# Patient Record
Sex: FEMALE | Race: WHITE | NOT HISPANIC OR LATINO | URBAN - METROPOLITAN AREA
[De-identification: names, ages, dates, MRNs, and addresses within clinical notes are randomized per-mention and may not be internally consistent; named-entity substitution may affect disease eponyms.]

---

## 2017-01-30 ENCOUNTER — OUTPATIENT (OUTPATIENT)
Dept: OUTPATIENT SERVICES | Facility: HOSPITAL | Age: 77
LOS: 1 days | Discharge: HOME | End: 2017-01-30

## 2017-05-03 ENCOUNTER — OUTPATIENT (OUTPATIENT)
Dept: OUTPATIENT SERVICES | Facility: HOSPITAL | Age: 77
LOS: 1 days | Discharge: HOME | End: 2017-05-03

## 2017-06-28 DIAGNOSIS — Z01.818 ENCOUNTER FOR OTHER PREPROCEDURAL EXAMINATION: ICD-10-CM

## 2017-10-02 DIAGNOSIS — R74.8 ABNORMAL LEVELS OF OTHER SERUM ENZYMES: ICD-10-CM

## 2017-10-02 DIAGNOSIS — R94.6 ABNORMAL RESULTS OF THYROID FUNCTION STUDIES: ICD-10-CM

## 2017-10-02 DIAGNOSIS — R68.89 OTHER GENERAL SYMPTOMS AND SIGNS: ICD-10-CM

## 2017-10-02 DIAGNOSIS — R79.82 ELEVATED C-REACTIVE PROTEIN (CRP): ICD-10-CM

## 2017-10-02 DIAGNOSIS — E78.4 OTHER HYPERLIPIDEMIA: ICD-10-CM

## 2017-10-02 DIAGNOSIS — Z02.89 ENCOUNTER FOR OTHER ADMINISTRATIVE EXAMINATIONS: ICD-10-CM

## 2017-10-02 DIAGNOSIS — E55.9 VITAMIN D DEFICIENCY, UNSPECIFIED: ICD-10-CM

## 2017-10-02 DIAGNOSIS — R73.09 OTHER ABNORMAL GLUCOSE: ICD-10-CM

## 2017-10-16 ENCOUNTER — OUTPATIENT (OUTPATIENT)
Dept: OUTPATIENT SERVICES | Facility: HOSPITAL | Age: 77
LOS: 1 days | Discharge: HOME | End: 2017-10-16

## 2017-10-16 DIAGNOSIS — R10.9 UNSPECIFIED ABDOMINAL PAIN: ICD-10-CM

## 2017-10-16 DIAGNOSIS — Z12.31 ENCOUNTER FOR SCREENING MAMMOGRAM FOR MALIGNANT NEOPLASM OF BREAST: ICD-10-CM

## 2017-10-23 ENCOUNTER — OUTPATIENT (OUTPATIENT)
Dept: OUTPATIENT SERVICES | Facility: HOSPITAL | Age: 77
LOS: 1 days | Discharge: HOME | End: 2017-10-23

## 2017-10-25 DIAGNOSIS — Z13.820 ENCOUNTER FOR SCREENING FOR OSTEOPOROSIS: ICD-10-CM

## 2017-10-25 DIAGNOSIS — Z78.0 ASYMPTOMATIC MENOPAUSAL STATE: ICD-10-CM

## 2017-10-25 DIAGNOSIS — M89.9 DISORDER OF BONE, UNSPECIFIED: ICD-10-CM

## 2017-11-06 ENCOUNTER — OUTPATIENT (OUTPATIENT)
Dept: OUTPATIENT SERVICES | Facility: HOSPITAL | Age: 77
LOS: 1 days | Discharge: HOME | End: 2017-11-06

## 2017-11-06 DIAGNOSIS — N28.9 DISORDER OF KIDNEY AND URETER, UNSPECIFIED: ICD-10-CM

## 2017-11-14 ENCOUNTER — OUTPATIENT (OUTPATIENT)
Dept: OUTPATIENT SERVICES | Facility: HOSPITAL | Age: 77
LOS: 1 days | Discharge: HOME | End: 2017-11-14

## 2017-11-14 DIAGNOSIS — R11.0 NAUSEA: ICD-10-CM

## 2018-05-26 ENCOUNTER — INPATIENT (INPATIENT)
Facility: HOSPITAL | Age: 78
LOS: 1 days | Discharge: HOME | End: 2018-05-28
Attending: INTERNAL MEDICINE | Admitting: INTERNAL MEDICINE

## 2018-05-26 VITALS
TEMPERATURE: 98 F | OXYGEN SATURATION: 97 % | RESPIRATION RATE: 20 BRPM | HEART RATE: 80 BPM | DIASTOLIC BLOOD PRESSURE: 80 MMHG | SYSTOLIC BLOOD PRESSURE: 168 MMHG

## 2018-05-26 RX ORDER — SODIUM CHLORIDE 9 MG/ML
1000 INJECTION INTRAMUSCULAR; INTRAVENOUS; SUBCUTANEOUS
Qty: 0 | Refills: 0 | Status: DISCONTINUED | OUTPATIENT
Start: 2018-05-26 | End: 2018-05-27

## 2018-05-26 RX ORDER — SODIUM CHLORIDE 9 MG/ML
3 INJECTION INTRAMUSCULAR; INTRAVENOUS; SUBCUTANEOUS ONCE
Qty: 0 | Refills: 0 | Status: COMPLETED | OUTPATIENT
Start: 2018-05-26 | End: 2018-05-26

## 2018-05-26 NOTE — ED ADULT NURSE NOTE - OBJECTIVE STATEMENT
Patient states she started to feeling head congestion this afternoon so she took 2 amoxicillin tablets, ate shrimp at that time. Preceding this she had one episode of vomiting which she stated was bile consistency but pink color. Reports history of GERD but non compliant with taking medication.

## 2018-05-27 DIAGNOSIS — Z96.651 PRESENCE OF RIGHT ARTIFICIAL KNEE JOINT: Chronic | ICD-10-CM

## 2018-05-27 DIAGNOSIS — Z90.710 ACQUIRED ABSENCE OF BOTH CERVIX AND UTERUS: Chronic | ICD-10-CM

## 2018-05-27 DIAGNOSIS — Z90.49 ACQUIRED ABSENCE OF OTHER SPECIFIED PARTS OF DIGESTIVE TRACT: Chronic | ICD-10-CM

## 2018-05-27 LAB
ALBUMIN SERPL ELPH-MCNC: 4.9 G/DL — SIGNIFICANT CHANGE UP (ref 3.5–5.2)
ALP SERPL-CCNC: 77 U/L — SIGNIFICANT CHANGE UP (ref 30–115)
ALT FLD-CCNC: 11 U/L — SIGNIFICANT CHANGE UP (ref 0–41)
ANION GAP SERPL CALC-SCNC: 13 MMOL/L — SIGNIFICANT CHANGE UP (ref 7–14)
APPEARANCE UR: CLEAR — SIGNIFICANT CHANGE UP
APTT BLD: 26.1 SEC — LOW (ref 27–39.2)
AST SERPL-CCNC: 15 U/L — SIGNIFICANT CHANGE UP (ref 0–41)
BASOPHILS # BLD AUTO: 0.03 K/UL — SIGNIFICANT CHANGE UP (ref 0–0.2)
BASOPHILS NFR BLD AUTO: 0.5 % — SIGNIFICANT CHANGE UP (ref 0–1)
BILIRUB SERPL-MCNC: 0.3 MG/DL — SIGNIFICANT CHANGE UP (ref 0.2–1.2)
BILIRUB UR-MCNC: NEGATIVE — SIGNIFICANT CHANGE UP
BUN SERPL-MCNC: 12 MG/DL — SIGNIFICANT CHANGE UP (ref 10–20)
CALCIUM SERPL-MCNC: 9.8 MG/DL — SIGNIFICANT CHANGE UP (ref 8.5–10.1)
CHLORIDE SERPL-SCNC: 103 MMOL/L — SIGNIFICANT CHANGE UP (ref 98–110)
CHOLEST SERPL-MCNC: 146 MG/DL — SIGNIFICANT CHANGE UP (ref 100–200)
CK MB CFR SERPL CALC: <0.1 NG/ML — LOW (ref 0.6–6.3)
CK MB CFR SERPL CALC: <0.1 NG/ML — LOW (ref 0.6–6.3)
CK SERPL-CCNC: 34 U/L — SIGNIFICANT CHANGE UP (ref 0–225)
CK SERPL-CCNC: 37 U/L — SIGNIFICANT CHANGE UP (ref 0–225)
CO2 SERPL-SCNC: 28 MMOL/L — SIGNIFICANT CHANGE UP (ref 17–32)
COLOR SPEC: YELLOW — SIGNIFICANT CHANGE UP
CREAT SERPL-MCNC: 0.7 MG/DL — SIGNIFICANT CHANGE UP (ref 0.7–1.5)
DIFF PNL FLD: NEGATIVE — SIGNIFICANT CHANGE UP
EOSINOPHIL # BLD AUTO: 0.02 K/UL — SIGNIFICANT CHANGE UP (ref 0–0.7)
EOSINOPHIL NFR BLD AUTO: 0.4 % — SIGNIFICANT CHANGE UP (ref 0–8)
ESTIMATED AVERAGE GLUCOSE: 111 MG/DL — SIGNIFICANT CHANGE UP (ref 68–114)
GLUCOSE SERPL-MCNC: 101 MG/DL — HIGH (ref 70–99)
GLUCOSE UR QL: NEGATIVE MG/DL — SIGNIFICANT CHANGE UP
HBA1C BLD-MCNC: 5.5 % — SIGNIFICANT CHANGE UP (ref 4–5.6)
HCT VFR BLD CALC: 38.7 % — SIGNIFICANT CHANGE UP (ref 37–47)
HDLC SERPL-MCNC: 65 MG/DL — SIGNIFICANT CHANGE UP (ref 40–125)
HGB BLD-MCNC: 13.1 G/DL — SIGNIFICANT CHANGE UP (ref 12–16)
IMM GRANULOCYTES NFR BLD AUTO: 0.4 % — HIGH (ref 0.1–0.3)
INR BLD: 1.12 RATIO — SIGNIFICANT CHANGE UP (ref 0.65–1.3)
KETONES UR-MCNC: NEGATIVE — SIGNIFICANT CHANGE UP
LACTATE SERPL-SCNC: 1.1 MMOL/L — SIGNIFICANT CHANGE UP (ref 0.5–2.2)
LEUKOCYTE ESTERASE UR-ACNC: NEGATIVE — SIGNIFICANT CHANGE UP
LIPID PNL WITH DIRECT LDL SERPL: 71 MG/DL — SIGNIFICANT CHANGE UP (ref 4–129)
LYMPHOCYTES # BLD AUTO: 1 K/UL — LOW (ref 1.2–3.4)
LYMPHOCYTES # BLD AUTO: 17.6 % — LOW (ref 20.5–51.1)
MAGNESIUM SERPL-MCNC: 2.3 MG/DL — SIGNIFICANT CHANGE UP (ref 1.8–2.4)
MCHC RBC-ENTMCNC: 31.6 PG — HIGH (ref 27–31)
MCHC RBC-ENTMCNC: 33.9 G/DL — SIGNIFICANT CHANGE UP (ref 32–37)
MCV RBC AUTO: 93.3 FL — SIGNIFICANT CHANGE UP (ref 81–99)
MONOCYTES # BLD AUTO: 0.39 K/UL — SIGNIFICANT CHANGE UP (ref 0.1–0.6)
MONOCYTES NFR BLD AUTO: 6.9 % — SIGNIFICANT CHANGE UP (ref 1.7–9.3)
NEUTROPHILS # BLD AUTO: 4.21 K/UL — SIGNIFICANT CHANGE UP (ref 1.4–6.5)
NEUTROPHILS NFR BLD AUTO: 74.2 % — SIGNIFICANT CHANGE UP (ref 42.2–75.2)
NITRITE UR-MCNC: NEGATIVE — SIGNIFICANT CHANGE UP
NRBC # BLD: 0 /100 WBCS — SIGNIFICANT CHANGE UP (ref 0–0)
NT-PROBNP SERPL-SCNC: 115 PG/ML — SIGNIFICANT CHANGE UP (ref 0–300)
PH UR: 6.5 — SIGNIFICANT CHANGE UP (ref 5–8)
PLATELET # BLD AUTO: 258 K/UL — SIGNIFICANT CHANGE UP (ref 130–400)
POTASSIUM SERPL-MCNC: 3.9 MMOL/L — SIGNIFICANT CHANGE UP (ref 3.5–5)
POTASSIUM SERPL-SCNC: 3.9 MMOL/L — SIGNIFICANT CHANGE UP (ref 3.5–5)
PROT SERPL-MCNC: 7 G/DL — SIGNIFICANT CHANGE UP (ref 6–8)
PROT UR-MCNC: NEGATIVE MG/DL — SIGNIFICANT CHANGE UP
PROTHROM AB SERPL-ACNC: 12.1 SEC — SIGNIFICANT CHANGE UP (ref 9.95–12.87)
RBC # BLD: 4.15 M/UL — LOW (ref 4.2–5.4)
RBC # FLD: 13.3 % — SIGNIFICANT CHANGE UP (ref 11.5–14.5)
SODIUM SERPL-SCNC: 144 MMOL/L — SIGNIFICANT CHANGE UP (ref 135–146)
SP GR SPEC: 1.01 — SIGNIFICANT CHANGE UP (ref 1.01–1.03)
TOTAL CHOLESTEROL/HDL RATIO MEASUREMENT: 2.2 RATIO — LOW (ref 4–5.5)
TRIGL SERPL-MCNC: 52 MG/DL — SIGNIFICANT CHANGE UP (ref 10–149)
TROPONIN T SERPL-MCNC: <0.01 NG/ML — SIGNIFICANT CHANGE UP
UROBILINOGEN FLD QL: 0.2 MG/DL — SIGNIFICANT CHANGE UP (ref 0.2–0.2)
WBC # BLD: 5.67 K/UL — SIGNIFICANT CHANGE UP (ref 4.8–10.8)
WBC # FLD AUTO: 5.67 K/UL — SIGNIFICANT CHANGE UP (ref 4.8–10.8)

## 2018-05-27 RX ORDER — METOPROLOL TARTRATE 50 MG
25 TABLET ORAL DAILY
Qty: 0 | Refills: 0 | Status: DISCONTINUED | OUTPATIENT
Start: 2018-05-27 | End: 2018-05-28

## 2018-05-27 RX ORDER — ENOXAPARIN SODIUM 100 MG/ML
40 INJECTION SUBCUTANEOUS EVERY 24 HOURS
Qty: 0 | Refills: 0 | Status: DISCONTINUED | OUTPATIENT
Start: 2018-05-27 | End: 2018-05-28

## 2018-05-27 RX ORDER — METOPROLOL TARTRATE 50 MG
0 TABLET ORAL
Qty: 0 | Refills: 0 | COMMUNITY

## 2018-05-27 RX ORDER — AMLODIPINE BESYLATE 2.5 MG/1
5 TABLET ORAL DAILY
Qty: 0 | Refills: 0 | Status: DISCONTINUED | OUTPATIENT
Start: 2018-05-27 | End: 2018-05-28

## 2018-05-27 RX ORDER — CLOPIDOGREL BISULFATE 75 MG/1
75 TABLET, FILM COATED ORAL DAILY
Qty: 0 | Refills: 0 | Status: DISCONTINUED | OUTPATIENT
Start: 2018-05-27 | End: 2018-05-28

## 2018-05-27 RX ORDER — RANITIDINE HYDROCHLORIDE 150 MG/1
0 TABLET, FILM COATED ORAL
Qty: 0 | Refills: 0 | COMMUNITY

## 2018-05-27 RX ORDER — SUCRALFATE 1 G
0 TABLET ORAL
Qty: 0 | Refills: 0 | COMMUNITY

## 2018-05-27 RX ORDER — ATORVASTATIN CALCIUM 80 MG/1
10 TABLET, FILM COATED ORAL AT BEDTIME
Qty: 0 | Refills: 0 | Status: DISCONTINUED | OUTPATIENT
Start: 2018-05-27 | End: 2018-05-28

## 2018-05-27 RX ORDER — AMLODIPINE BESYLATE 2.5 MG/1
0 TABLET ORAL
Qty: 0 | Refills: 0 | COMMUNITY

## 2018-05-27 RX ORDER — CLOPIDOGREL BISULFATE 75 MG/1
0 TABLET, FILM COATED ORAL
Qty: 0 | Refills: 0 | COMMUNITY

## 2018-05-27 RX ORDER — SIMVASTATIN 20 MG/1
0 TABLET, FILM COATED ORAL
Qty: 0 | Refills: 0 | COMMUNITY

## 2018-05-27 RX ORDER — MECLIZINE HCL 12.5 MG
25 TABLET ORAL THREE TIMES A DAY
Qty: 0 | Refills: 0 | Status: DISCONTINUED | OUTPATIENT
Start: 2018-05-27 | End: 2018-05-28

## 2018-05-27 RX ORDER — ACETAMINOPHEN 500 MG
500 TABLET ORAL ONCE
Qty: 0 | Refills: 0 | Status: COMPLETED | OUTPATIENT
Start: 2018-05-27 | End: 2018-05-27

## 2018-05-27 RX ORDER — SUCRALFATE 1 G
1 TABLET ORAL
Qty: 0 | Refills: 0 | Status: DISCONTINUED | OUTPATIENT
Start: 2018-05-27 | End: 2018-05-28

## 2018-05-27 RX ORDER — FAMOTIDINE 10 MG/ML
20 INJECTION INTRAVENOUS
Qty: 0 | Refills: 0 | Status: DISCONTINUED | OUTPATIENT
Start: 2018-05-27 | End: 2018-05-28

## 2018-05-27 RX ORDER — LISINOPRIL 2.5 MG/1
20 TABLET ORAL DAILY
Qty: 0 | Refills: 0 | Status: DISCONTINUED | OUTPATIENT
Start: 2018-05-27 | End: 2018-05-28

## 2018-05-27 RX ADMIN — Medication 500 MILLIGRAM(S): at 21:45

## 2018-05-27 RX ADMIN — SODIUM CHLORIDE 3 MILLILITER(S): 9 INJECTION INTRAMUSCULAR; INTRAVENOUS; SUBCUTANEOUS at 00:15

## 2018-05-27 RX ADMIN — AMLODIPINE BESYLATE 5 MILLIGRAM(S): 2.5 TABLET ORAL at 12:36

## 2018-05-27 RX ADMIN — Medication 25 MILLIGRAM(S): at 12:36

## 2018-05-27 RX ADMIN — Medication 1 GRAM(S): at 23:21

## 2018-05-27 RX ADMIN — Medication 1 GRAM(S): at 12:37

## 2018-05-27 RX ADMIN — LISINOPRIL 20 MILLIGRAM(S): 2.5 TABLET ORAL at 12:36

## 2018-05-27 RX ADMIN — FAMOTIDINE 20 MILLIGRAM(S): 10 INJECTION INTRAVENOUS at 18:12

## 2018-05-27 RX ADMIN — SODIUM CHLORIDE 125 MILLILITER(S): 9 INJECTION INTRAMUSCULAR; INTRAVENOUS; SUBCUTANEOUS at 00:16

## 2018-05-27 RX ADMIN — CLOPIDOGREL BISULFATE 75 MILLIGRAM(S): 75 TABLET, FILM COATED ORAL at 12:36

## 2018-05-27 RX ADMIN — Medication 1 GRAM(S): at 18:12

## 2018-05-27 RX ADMIN — Medication 500 MILLIGRAM(S): at 22:25

## 2018-05-27 RX ADMIN — ENOXAPARIN SODIUM 40 MILLIGRAM(S): 100 INJECTION SUBCUTANEOUS at 13:59

## 2018-05-27 NOTE — H&P ADULT - NSHPLABSRESULTS_GEN_ALL_CORE
Labs:                         13.1   5.67    )-----------(   258      ( 26 May 2018 23:55 )              38.7     Neutro%  74.2    Lympho%  17.6<L>   Mono%    6.9     Bands    x            144  |  103  |  12  ----------------------------<  101<H>  3.9   |  28  |  0.7    Ca    9.8      26 May 2018 23:55  Mg     2.3         TPro  7.0  /  Alb  4.9  /  TBili  0.3  /  DBili  x   /  AST  15  /  ALT  11  /  AlkPhos  77        PT/INR - ( 26 May 2018 23:55 )   PT: 12.10 sec;   INR: 1.12 ratio         PTT - ( 26 May 2018 23:55 )  PTT:26.1 sec    CARDIAC MARKERS ( 26 May 2018 23:55 )  x     / <0.01 ng/mL / 37 U/L / x     / x            LIVER FUNCTIONS - ( 26 May 2018 23:55 )  Alb: 4.9 g/dL / Pro: 7.0 g/dL / ALK PHOS: 77 U/L / ALT: 11 U/L / AST: 15 U/L / GGT: x               Lactate, Blood: 1.1 mmol/L (18 @ 23:55)      Urinalysis Basic - ( 26 May 2018 23:50 )    Color: Yellow / Appearance: Clear / S.010 / pH: x  Gluc: x / Ketone: Negative  / Bili: Negative / Urobili: 0.2 mg/dL   Blood: x / Protein: Negative mg/dL / Nitrite: Negative   Leuk Esterase: Negative / RBC: x / WBC x   Sq Epi: x / Non Sq Epi: x / Bacteria: x        < from: CT Head No Cont (18 @ 02:38) >    No CT evidence for acute intracranial pathology.    Moderate chronic microvascular ischemic changes.      < end of copied text >    < from: CT Chest w/ IV Cont (18 @ 02:42) >    No pulmonary embolism.    No acute intrathoracic pathology.    < end of copied text >

## 2018-05-27 NOTE — H&P ADULT - ATTENDING COMMENTS
PATIENT SEEN AND EXAMINED INDEPENDENTLY -AGREE WITH FORBES OF RESIDENT.  VITAL SIGNS (Last 24 hrs):  T(C): 35.6 (18 @ 06:14), Max: 36.8 (18 @ 21:57)  HR: 63 (18 @ 06:14) (63 - 80)  BP: 163/74 (18 @ 06:14) (152/70 - 168/80)  RR: 18 (18 @ 06:14) (18 - 20)  SpO2: 97% (18 @ 23:40) (97% - 97%)  Wt(kg): --  Daily Height in cm: 162.56 (27 May 2018 06:14)    Daily Weight in k (27 May 2018 06:14)    I&O's Summary                        13.1   5.67  )-----------( 258      ( 26 May 2018 23:55 )             38.7       144  |  103  |  12  ----------------------------<  101<H>  3.9   |  28  |  0.7    Ca    9.8      26 May 2018 23:55  Mg     2.3         TPro  7.0  /  Alb  4.9  /  TBili  0.3  /  DBili  x   /  AST  15  /  ALT  11  /  AlkPhos  77    ekg-NSR rate 66/min.  o/e  aaox3  chest-b/l clear  cvs=s1s2n  abd-soft, bs+  cns-no neuro deficit  ASSESSMENT AND PLAN:  # DIZZINESS-recent uri- could be BPPV. telemetry monitoring. Ct head was negative  # CAD- pending 2nds et of cardiac enzymes and hx of PCI will get echo and cardiology eval.o plavix and metoprolol.  # Anxiety- continue xanax.  # HTN-amlodipine and lisinopril.

## 2018-05-27 NOTE — H&P ADULT - ASSESSMENT
Patient is a 77y old  Female who presents with a chief complaint of Dizziness (27 May 2018 08:58)    PAST MEDICAL & SURGICAL HISTORY:  Anxiety  GERD (gastroesophageal reflux disease)  CAD (coronary artery disease)  Hypertension  S/P cholecystectomy  H/O total knee replacement, right  H/O abdominal hysterectomy    Dizziness likely secondary to recent urti, but given cardiac history other causes should be ruled out  ·	CT head/chest negative for acute pathology  ·	CE 1st set negative obtain 2 more  ·	check orthostatic vitals  ·	24h telemetry monitoring  ·	check 2d echo  ·	check tsh, hba1c, lipid profile  ·	cardiology evaluation as patient has reported history of stent restenosis and worsening progressive dyspnea    Hypertension  ·	continue home medications (or equivalents)     GERD  ·	continue carafate, pepcid    ·	DVT ppx  ·	full code  ·	Dispo: likely home  ·	HH low Na diet  ·	Ambulate as tolerated

## 2018-05-27 NOTE — H&P ADULT - HISTORY OF PRESENT ILLNESS
Patient is a 77y old  Female who presents with a chief complaint of dizziness and headache. She has a PMH of CAD s/p stents on 3 occasions, HTN and GERD. She states she was on the phone with her son when she felt a sudden onset dizziness and weakness and she dropped the phone and thought she might pass out. She does not report LOC, chest pain, or focal neurological deficits such as extremity weakness or speech slurring at that time. She states she ate shellfish and after the event felt very nauseated and vomited pink colored vomitus. She denies recent fever, but states she has symptoms of a minor URTI with a dry cough and sinus pressure the past few days. She did not have any further episodes of vomiting, chest pain or this dizziness, but she remains fatigued and does state she feels dyspneic. She also reports that during physical therapy for her knee replacement her PT recommended she see her cardiologist because she has been getting more tired and seemed short of breath during sessions, which is unusual for her. She denies changes to her medications and recent endoscopy and colonoscopy are significant only for signs of GERD

## 2018-05-27 NOTE — ED PROVIDER NOTE - NS ED ROS FT
Constitutional:  no fevers, no chills, no malaise  Eyes:  No visual changes  ENMT: No neck pain or stiffness, no nasal congestion, no ear pain, no throat pain  Cardiac:  see hpi  Respiratory:  see hpi  GI:  + nausea, vomiting. no diarrhea or abdominal pain.  :  No dysuria, frequency or burning.  MS:  No back pain, no joint pain.  Neuro:  see hpi  Skin:  No skin rash  Except as documented in the HPI,  all other systems are negative

## 2018-05-27 NOTE — H&P ADULT - NSHPPHYSICALEXAM_GEN_ALL_CORE
T(C): 35.6 (05-27-18 @ 06:14), Max: 36.8 (05-26-18 @ 21:57)  HR: 63 (05-27-18 @ 06:14) (63 - 80)  BP: 163/74 (05-27-18 @ 06:14) (152/70 - 168/80)  RR: 18 (05-27-18 @ 06:14) (18 - 20)  SpO2: 97% (05-26-18 @ 23:40) (97% - 97%)    PHYSICAL EXAM:  GENERAL: NAD, well-developed  HEAD:  Atraumatic, Normocephalic  EYES: EOMI, PERRLA, conjunctiva and sclera clear  NECK: Supple, No JVD  CHEST/LUNG: Clear to auscultation bilaterally; No wheeze  HEART: Regular rate and rhythm; No murmurs, rubs, or gallops  ABDOMEN: Soft, Nontender, Nondistended; Bowel sounds present  EXTREMITIES:  2+ Peripheral Pulses, No clubbing, cyanosis, or edema  PSYCH: AAOx3  NEUROLOGY: non-focal  SKIN: No rashes or lesions

## 2018-05-27 NOTE — ED PROVIDER NOTE - OBJECTIVE STATEMENT
76 y/o female with h/o HTN, CAD s/p stent x 3, in ER with c/o episode of near syncope and SOB earlier tonight.  Pt states tonight she suddenly felt very lightheaded, dizzy, felt like she might pass ou, but did not.  + pressure to Head/face, but no HA.  +N and started vomiting.  + SOB.  no CP.  no back pain.  no abd pain. + palpitations, felt like heart was racing.  no diarrhea. no black or bloody stools.  no le pain/swelling.  no h/o smoking, no recent travel.  Pt states she feels better now.

## 2018-05-27 NOTE — ED PROVIDER NOTE - MEDICAL DECISION MAKING DETAILS
78 y/o female with h/o htn and CAD s/p stent x 3 in ER with c/o near syncope and dyspnea.  ekg, trop ok,  head ct no acute changes, no pe on ct chest, to admit to tele for cardiac eval.

## 2018-05-27 NOTE — ED PROVIDER NOTE - PHYSICAL EXAMINATION

## 2018-05-28 ENCOUNTER — TRANSCRIPTION ENCOUNTER (OUTPATIENT)
Age: 78
End: 2018-05-28

## 2018-05-28 VITALS
DIASTOLIC BLOOD PRESSURE: 70 MMHG | RESPIRATION RATE: 18 BRPM | TEMPERATURE: 96 F | SYSTOLIC BLOOD PRESSURE: 156 MMHG | HEART RATE: 62 BPM

## 2018-05-28 LAB
ANION GAP SERPL CALC-SCNC: 11 MMOL/L — SIGNIFICANT CHANGE UP (ref 7–14)
BUN SERPL-MCNC: 16 MG/DL — SIGNIFICANT CHANGE UP (ref 10–20)
CALCIUM SERPL-MCNC: 9.7 MG/DL — SIGNIFICANT CHANGE UP (ref 8.5–10.1)
CHLORIDE SERPL-SCNC: 102 MMOL/L — SIGNIFICANT CHANGE UP (ref 98–110)
CO2 SERPL-SCNC: 28 MMOL/L — SIGNIFICANT CHANGE UP (ref 17–32)
CREAT SERPL-MCNC: 0.8 MG/DL — SIGNIFICANT CHANGE UP (ref 0.7–1.5)
CULTURE RESULTS: NO GROWTH — SIGNIFICANT CHANGE UP
GLUCOSE SERPL-MCNC: 91 MG/DL — SIGNIFICANT CHANGE UP (ref 70–99)
POTASSIUM SERPL-MCNC: 4.8 MMOL/L — SIGNIFICANT CHANGE UP (ref 3.5–5)
POTASSIUM SERPL-SCNC: 4.8 MMOL/L — SIGNIFICANT CHANGE UP (ref 3.5–5)
SODIUM SERPL-SCNC: 141 MMOL/L — SIGNIFICANT CHANGE UP (ref 135–146)
SPECIMEN SOURCE: SIGNIFICANT CHANGE UP
T3 SERPL-MCNC: 101 NG/DL — SIGNIFICANT CHANGE UP (ref 80–200)
T4 AB SER-ACNC: 7.3 UG/DL — SIGNIFICANT CHANGE UP (ref 4.6–12)
TSH SERPL-MCNC: 1.15 UIU/ML — SIGNIFICANT CHANGE UP (ref 0.27–4.2)

## 2018-05-28 RX ORDER — MECLIZINE HCL 12.5 MG
1 TABLET ORAL
Qty: 0 | Refills: 0 | COMMUNITY
Start: 2018-05-28

## 2018-05-28 RX ADMIN — ENOXAPARIN SODIUM 40 MILLIGRAM(S): 100 INJECTION SUBCUTANEOUS at 13:01

## 2018-05-28 RX ADMIN — Medication 1 GRAM(S): at 11:15

## 2018-05-28 RX ADMIN — Medication 1 GRAM(S): at 06:12

## 2018-05-28 RX ADMIN — Medication 25 MILLIGRAM(S): at 06:13

## 2018-05-28 RX ADMIN — AMLODIPINE BESYLATE 5 MILLIGRAM(S): 2.5 TABLET ORAL at 06:14

## 2018-05-28 RX ADMIN — FAMOTIDINE 20 MILLIGRAM(S): 10 INJECTION INTRAVENOUS at 06:12

## 2018-05-28 RX ADMIN — LISINOPRIL 20 MILLIGRAM(S): 2.5 TABLET ORAL at 06:12

## 2018-05-28 RX ADMIN — CLOPIDOGREL BISULFATE 75 MILLIGRAM(S): 75 TABLET, FILM COATED ORAL at 11:15

## 2018-05-28 NOTE — PROGRESS NOTE ADULT - SUBJECTIVE AND OBJECTIVE BOX
SUBJECTIVE:    Patient is a 77y old Female who presents with a chief complaint of Dizziness (27 May 2018 08:58)    Currently admitted to medicine with the primary diagnosis of Near syncope     Today is hospital day 1d. This morning she is resting comfortably in bed and reports no new issues or overnight events.     PAST MEDICAL & SURGICAL HISTORY  Anxiety  GERD (gastroesophageal reflux disease)  CAD (coronary artery disease)  Hypertension  S/P cholecystectomy  H/O total knee replacement, right  H/O abdominal hysterectomy    SOCIAL HISTORY:  Negative for smoking/alcohol/drug use.     ALLERGIES:  No Known Allergies    MEDICATIONS:  STANDING MEDICATIONS  amLODIPine   Tablet 5 milliGRAM(s) Oral daily  atorvastatin 10 milliGRAM(s) Oral at bedtime  clopidogrel Tablet 75 milliGRAM(s) Oral daily  enoxaparin Injectable 40 milliGRAM(s) SubCutaneous every 24 hours  famotidine    Tablet 20 milliGRAM(s) Oral two times a day  lisinopril 20 milliGRAM(s) Oral daily  metoprolol succinate ER 25 milliGRAM(s) Oral daily  sucralfate 1 Gram(s) Oral four times a day    PRN MEDICATIONS  meclizine 25 milliGRAM(s) Oral three times a day PRN    VITALS:   T(F): 96.3  HR: 62  BP: 117/63  RR: 18  SpO2: --    LABS:                        13.1   5.67  )-----------( 258      ( 26 May 2018 23:55 )             38.7         141  |  102  |  16  ----------------------------<  91  4.8   |  28  |  0.8    Ca    9.7      28 May 2018 06:29  Mg     2.3         TPro  7.0  /  Alb  4.9  /  TBili  0.3  /  DBili  x   /  AST  15  /  ALT  11  /  AlkPhos  77      PT/INR - ( 26 May 2018 23:55 )   PT: 12.10 sec;   INR: 1.12 ratio         PTT - ( 26 May 2018 23:55 )  PTT:26.1 sec  Urinalysis Basic - ( 26 May 2018 23:50 )    Color: Yellow / Appearance: Clear / S.010 / pH: x  Gluc: x / Ketone: Negative  / Bili: Negative / Urobili: 0.2 mg/dL   Blood: x / Protein: Negative mg/dL / Nitrite: Negative   Leuk Esterase: Negative / RBC: x / WBC x   Sq Epi: x / Non Sq Epi: x / Bacteria: x        Troponin T, Serum: <0.01 ng/mL (18 @ 17:28)  Creatine Kinase, Serum: 34 U/L (18 @ 17:28)      Culture - Urine (collected 26 May 2018 23:50)  Source: .Urine Clean Catch (Midstream)  Final Report (28 May 2018 11:45):    No growth      CARDIAC MARKERS ( 27 May 2018 17:28 )  x     / <0.01 ng/mL / 34 U/L / x     / <0.1 ng/mL  CARDIAC MARKERS ( 27 May 2018 11:12 )  x     / <0.01 ng/mL / x     / x     / <0.1 ng/mL  CARDIAC MARKERS ( 26 May 2018 23:55 )  x     / <0.01 ng/mL / 37 U/L / x     / x          RADIOLOGY:    PHYSICAL EXAM:  GEN: No acute distress  LUNGS: Clear to auscultation bilaterally   HEART: S1/S2 present. RRR.   ABD/ GI: Soft, non-tender, non-distended. Bowel sounds present  EXT: NC/NC/NE/2+PP/LI  NEURO: AAOX3

## 2018-05-28 NOTE — DISCHARGE NOTE ADULT - CARE PLAN
Principal Discharge DX:	Near syncope  Goal:	treatment  Assessment and plan of treatment:	-your CT and echo was normal  -follow up dr Mejia in 1 week  -follow up with PMD in 1-2 week  -keep hydrated  -avoid rapid rising form sitting position

## 2018-05-28 NOTE — DISCHARGE NOTE ADULT - PATIENT PORTAL LINK FT
You can access the [facility] Patient Portal, offered by [facility], by registering with the following website: [email or web address]

## 2018-05-28 NOTE — PROGRESS NOTE ADULT - SUBJECTIVE AND OBJECTIVE BOX
Patient is a 77y old  Female who presents with a chief complaint of Dizziness (27 May 2018 08:58)    Pt seen and examined at bedside    HPI:  Patient is a 77y old  Female who presents with a chief complaint of dizziness and headache. She has a PMH of CAD s/p stents on 3 occasions, HTN and GERD. She states she was on the phone with her son when she felt a sudden onset dizziness and weakness and she dropped the phone and thought she might pass out. She does not report LOC, chest pain, or focal neurological deficits such as extremity weakness or speech slurring at that time. She states she ate shellfish and after the event felt very nauseated and vomited pink colored vomitus. She denies recent fever, but states she has symptoms of a minor URTI with a dry cough and sinus pressure the past few days. She did not have any further episodes of vomiting, chest pain or this dizziness, but she remains fatigued and does state she feels dyspneic. She also reports that during physical therapy for her knee replacement her PT recommended she see her cardiologist because she has been getting more tired and seemed short of breath during sessions, which is unusual for her. She denies changes to her medications and recent endoscopy and colonoscopy are significant only for signs of GERD (27 May 2018 08:58)    PAST MEDICAL & SURGICAL HISTORY:  Anxiety  GERD (gastroesophageal reflux disease)  CAD (coronary artery disease)  Hypertension  S/P cholecystectomy  H/O total knee replacement, right  H/O abdominal hysterectomy      Vital Signs Last 24 Hrs  T(F): 96.3 (18 @ 05:50), Max: 99 (18 @ 12:00)  HR: 62 (18 @ 06:11) (56 - 65)  BP: 117/63 (18 @ 05:50) (117/63 - 155/69)  RR: 18 (18 @ 05:50) (17 - 18)  SpO2: --    Labs:                         13.1   5.67  )-----------( 258      ( 26 May 2018 23:55 )             38.7         144  |  103  |  12  ----------------------------<  101<H>  3.9   |  28  |  0.7    Ca    9.8      26 May 2018 23:55  Mg     2.3         TPro  7.0  /  Alb  4.9  /  TBili  0.3  /  DBili  x   /  AST  15  /  ALT  11  /  AlkPhos  77      CAPILLARY BLOOD GLUCOSE        PT/INR - ( 26 May 2018 23:55 )   PT: 12.10 sec;   INR: 1.12 ratio         PTT - ( 26 May 2018 23:55 )  PTT:26.1 sec  Urinalysis Basic - ( 26 May 2018 23:50 )    Color: Yellow / Appearance: Clear / S.010 / pH: x  Gluc: x / Ketone: Negative  / Bili: Negative / Urobili: 0.2 mg/dL   Blood: x / Protein: Negative mg/dL / Nitrite: Negative   Leuk Esterase: Negative / RBC: x / WBC x   Sq Epi: x / Non Sq Epi: x / Bacteria: x            Cardiac Enzymes 18 @ 17:28  Trop I --  CKMB --  CK 34 U/L [0 - 225]        MEDICATIONS  (STANDING):  amLODIPine   Tablet 5 milliGRAM(s) Oral daily  atorvastatin 10 milliGRAM(s) Oral at bedtime  clopidogrel Tablet 75 milliGRAM(s) Oral daily  enoxaparin Injectable 40 milliGRAM(s) SubCutaneous every 24 hours  famotidine    Tablet 20 milliGRAM(s) Oral two times a day  lisinopril 20 milliGRAM(s) Oral daily  metoprolol succinate ER 25 milliGRAM(s) Oral daily  sucralfate 1 Gram(s) Oral four times a day    MEDICATIONS  (PRN):  meclizine 25 milliGRAM(s) Oral three times a day PRN Dizziness      Physical Exam:  General: NAD  HEENT:   Lungs: CTA B/L  Heart: RRR, Normal S1S2  Abdomen: soft, NT/ND  Extremities: no c/c/e  Neuro: AAO x3    Radiology: Patient is a 77y old  Female who presents with a chief complaint of Dizziness (27 May 2018 08:58)    Pt seen and examined at bedside. No acute complaints    HPI:  Patient is a 77y old  Female who presents with a chief complaint of dizziness and headache. She has a PMH of CAD s/p stents on 3 occasions, HTN and GERD. She states she was on the phone with her son when she felt a sudden onset dizziness and weakness and she dropped the phone and thought she might pass out. She does not report LOC, chest pain, or focal neurological deficits such as extremity weakness or speech slurring at that time. She states she ate shellfish and after the event felt very nauseated and vomited pink colored vomitus. She denies recent fever, but states she has symptoms of a minor URTI with a dry cough and sinus pressure the past few days. She did not have any further episodes of vomiting, chest pain or this dizziness, but she remains fatigued and does state she feels dyspneic. She also reports that during physical therapy for her knee replacement her PT recommended she see her cardiologist because she has been getting more tired and seemed short of breath during sessions, which is unusual for her. She denies changes to her medications and recent endoscopy and colonoscopy are significant only for signs of GERD (27 May 2018 08:58)    PAST MEDICAL & SURGICAL HISTORY:  Anxiety  GERD (gastroesophageal reflux disease)  CAD (coronary artery disease)  Hypertension  S/P cholecystectomy  H/O total knee replacement, right  H/O abdominal hysterectomy      Vital Signs Last 24 Hrs  T(F): 96.3 (18 @ 05:50), Max: 99 (18 @ 12:00)  HR: 62 (18 @ 06:11) (56 - 65)  BP: 117/63 (18 @ 05:50) (117/63 - 155/69)  RR: 18 (18 @ 05:50) (17 - 18)  SpO2: --    Labs:                         13.1   5.67  )-----------( 258      ( 26 May 2018 23:55 )             38.7         144  |  103  |  12  ----------------------------<  101<H>  3.9   |  28  |  0.7    Ca    9.8      26 May 2018 23:55  Mg     2.3         TPro  7.0  /  Alb  4.9  /  TBili  0.3  /  DBili  x   /  AST  15  /  ALT  11  /  AlkPhos  77      CAPILLARY BLOOD GLUCOSE        PT/INR - ( 26 May 2018 23:55 )   PT: 12.10 sec;   INR: 1.12 ratio         PTT - ( 26 May 2018 23:55 )  PTT:26.1 sec  Urinalysis Basic - ( 26 May 2018 23:50 )    Color: Yellow / Appearance: Clear / S.010 / pH: x  Gluc: x / Ketone: Negative  / Bili: Negative / Urobili: 0.2 mg/dL   Blood: x / Protein: Negative mg/dL / Nitrite: Negative   Leuk Esterase: Negative / RBC: x / WBC x   Sq Epi: x / Non Sq Epi: x / Bacteria: x            Cardiac Enzymes 18 @ 17:28  Trop I --  CKMB --  CK 34 U/L [0 - 225]        MEDICATIONS  (STANDING):  amLODIPine   Tablet 5 milliGRAM(s) Oral daily  atorvastatin 10 milliGRAM(s) Oral at bedtime  clopidogrel Tablet 75 milliGRAM(s) Oral daily  enoxaparin Injectable 40 milliGRAM(s) SubCutaneous every 24 hours  famotidine    Tablet 20 milliGRAM(s) Oral two times a day  lisinopril 20 milliGRAM(s) Oral daily  metoprolol succinate ER 25 milliGRAM(s) Oral daily  sucralfate 1 Gram(s) Oral four times a day    MEDICATIONS  (PRN):  meclizine 25 milliGRAM(s) Oral three times a day PRN Dizziness      Physical Exam:  General: NAD  Lungs: CTA B/L  Heart: RRR, Normal S1S2  Abdomen: soft, NT/ND  Extremities: no edema  Neuro: AAO x3    Radiology:

## 2018-05-28 NOTE — CONSULT NOTE ADULT - SUBJECTIVE AND OBJECTIVE BOX
Patient is a 77y old  Female who presents with a chief complaint of Dizziness (27 May 2018 08:58)      HPI: while sitting and speaking on the phone in a warm room suddenly felt a heat getting into her, dizziness, followed by feeling cold, pale, diaphoresis, rushed to bath room, had a severe vomiting and after that briefly LOC, her son saw her cold, diaphoretic and pale, received iv fluid in ED and all symptoms completely resolved.  Beside of the heat she had a shrimp also that was questionable to be old?  she feels well now and has no complaint       Patient is a 77y old  Female who presents with a chief complaint of dizziness and headache. She has a PMH of CAD s/p stents on 3 occasions, HTN and GERD. She states she was on the phone with her son when she felt a sudden onset dizziness and weakness and she dropped the phone and thought she might pass out. She does not report LOC, chest pain, or focal neurological deficits such as extremity weakness or speech slurring at that time. She states she ate shellfish and after the event felt very nauseated and vomited pink colored vomitus. She denies recent fever, but states she has symptoms of a minor URTI with a dry cough and sinus pressure the past few days. She did not have any further episodes of vomiting, chest pain or this dizziness, but she remains fatigued and does state she feels dyspneic. She also reports that during physical therapy for her knee replacement her PT recommended she see her cardiologist because she has been getting more tired and seemed short of breath during sessions, which is unusual for her. She denies changes to her medications and recent endoscopy and colonoscopy are significant only for signs of GERD (27 May 2018 08:58)      PAST MEDICAL & SURGICAL HISTORY:  Anxiety  GERD (gastroesophageal reflux disease)  CAD (coronary artery disease)  Hypertension  S/P cholecystectomy  H/O total knee replacement, right  H/O abdominal hysterectomy      PREVIOUS DIAGNOSTIC TESTING:      ECHO  FINDINGS: pending    STRESS TEST  FINDINGS: in 2017: persantine nuclear stress test resulted normal, EF 55-60%    CATHETERIZATION  FINDINGS: latest in : patent LAD, 30% stenosis in LCx stent and 50% stenosis in OM stent, left dominant system, other arteries normal, EF 60%    MEDICATIONS  (STANDING):  amLODIPine   Tablet 5 milliGRAM(s) Oral daily  atorvastatin 10 milliGRAM(s) Oral at bedtime  clopidogrel Tablet 75 milliGRAM(s) Oral daily  enoxaparin Injectable 40 milliGRAM(s) SubCutaneous every 24 hours  famotidine    Tablet 20 milliGRAM(s) Oral two times a day  lisinopril 20 milliGRAM(s) Oral daily  metoprolol succinate ER 25 milliGRAM(s) Oral daily  sucralfate 1 Gram(s) Oral four times a day    MEDICATIONS  (PRN):  meclizine 25 milliGRAM(s) Oral three times a day PRN Dizziness      FAMILY HISTORY:  No pertinent family history in first degree relatives      SOCIAL HISTORY:  CIGARETTES: No  ALCOHOL: No  DRUGS: No                      REVIEW OF SYSTEMS:  CONSTITUTIONAL: No distress, Looks stable, no complaint   . NECK: No pain  RESPIRATORY: No cough, wheezing, shortness of breath  CARDIOVASCULAR: No chest pain, SOB, palpitations, leg swelling  GASTROINTESTINAL: No abdominal or epigastric pain. No nausea, vomiting, or hematemesis;  No melena.  NEUROLOGICAL: No dizziness, headaches, memory loss, loss of strength  SKIN: No itching, burning, rashes, or lesions   ENDOCRINE: No heat or cold intolerance  MUSCULOSKELETAL: No joint pain, No  swelling; No muscle pain  PSYCHIATRIC: No depression, anxiety, mood swings, or difficulty sleeping  ALLERGY: No hives, itching, rash          Vital Signs Last 24 Hrs  T(C): 35.7 (28 May 2018 05:50), Max: 37.2 (27 May 2018 12:00)  T(F): 96.3 (28 May 2018 05:50), Max: 99 (27 May 2018 12:00)  HR: 62 (28 May 2018 06:11) (56 - 65)  BP: 117/63 (28 May 2018 05:50) (117/63 - 155/69)  BP(mean): --  RR: 18 (28 May 2018 05:50) (17 - 18)  SpO2: --                      PHYSICAL EXAM:  GENERAL: No distress, well developed  HEAD:  Atraumatic, Normocephalic  NECK: Supple, No JVD, No Bruit of either carotid arteries  NERVOUS SYSTEM:  Alert, Awake, Oriented to time, place, person; Normal memory and speech; Normal motor Strength 5/5 B/L upper and lower extremities  CHEST/LUNG: Normal air entry to lung base bilaterally; No wheeze, crackle, rales, rhonchi  HEART: Regular heart beat, S1, A2, P2, No S3, No S4, No gallop, No murmur  ABDOMEN: Soft, Non tender, Non distended; Bowel sounds present  EXTREMITIES:  2+ Peripheral Pulses, No clubbing, No edema  SKIN: No rashes or lesions    TELEMETRY: NSR    ECG: < from: 12 Lead ECG (18 @ 23:48) >  Normal sinus rhythm  Normal ECG    < end of copied text >      I&O's Detail      LABS:                        13.1   5.67  )-----------( 258      ( 26 May 2018 23:55 )             38.7         141  |  102  |  16  ----------------------------<  91  4.8   |  28  |  0.8    Ca    9.7      28 May 2018 06:29  Mg     2.3         TPro  7.0  /  Alb  4.9  /  TBili  0.3  /  DBili  x   /  AST  15  /  ALT  11  /  AlkPhos  77      CARDIAC MARKERS ( 27 May 2018 17:28 )  x     / <0.01 ng/mL / 34 U/L / x     / <0.1 ng/mL  CARDIAC MARKERS ( 27 May 2018 11:12 )  x     / <0.01 ng/mL / x     / x     / <0.1 ng/mL  CARDIAC MARKERS ( 26 May 2018 23:55 )  x     / <0.01 ng/mL / 37 U/L / x     / x          PT/INR - ( 26 May 2018 23:55 )   PT: 12.10 sec;   INR: 1.12 ratio         PTT - ( 26 May 2018 23:55 )  PTT:26.1 sec  Urinalysis Basic - ( 26 May 2018 23:50 )    Color: Yellow / Appearance: Clear / S.010 / pH: x  Gluc: x / Ketone: Negative  / Bili: Negative / Urobili: 0.2 mg/dL   Blood: x / Protein: Negative mg/dL / Nitrite: Negative   Leuk Esterase: Negative / RBC: x / WBC x   Sq Epi: x / Non Sq Epi: x / Bacteria: x      I&O's Summary      RADIOLOGY & ADDITIONAL STUDIES: < from: Xray Chest 1 View AP/PA (18 @ 00:51) >  No radiographic evidence of acute cardiopulmonary disease.    < end of copied text >

## 2018-05-28 NOTE — DISCHARGE NOTE ADULT - HOSPITAL COURSE
Patient is a 77y old  Female who presents with a chief complaint of dizziness and headache. She has a PMH of CAD s/p stents on 3 occasions, HTN and GERD. Ct head was negative. telemetry failed to show any arrhythmias echo cardio was within normal limits. patient gets better even without meclizine.

## 2018-05-28 NOTE — CONSULT NOTE ADULT - ASSESSMENT
syncope: vasovagal reaction, most likely due to sitting in the heat and being mildly volume depleted  responded well to time and hydration  no evidence of ACS, no arrhythmia was found    keep on monitoring  ambulate the patient   2d echo to assess wall motion, EF  if no major finding in monitoring or no new symptoms occur, she can be d/c home and be f/b dr Alford in the office at   i spoke to medical team

## 2018-05-28 NOTE — DISCHARGE NOTE ADULT - PROVIDER TOKENS
FREE:[LAST:[PMD],PHONE:[(   )    -],FAX:[(   )    -],ADDRESS:[follow up PMD in 1 week]],TOKEN:'[id number]'

## 2018-05-28 NOTE — DISCHARGE NOTE ADULT - MEDICATION SUMMARY - MEDICATIONS TO TAKE
I will START or STAY ON the medications listed below when I get home from the hospital:    pravastatin 10 mg oral tablet  -- 1 tab(s) by mouth once a day (at bedtime)  -- Indication: For DLD    amlodipine-benazepril 5 mg-20 mg oral capsule  -- 1 cap(s) by mouth once a day  -- Indication: For HTN    Plavix 75 mg oral tablet  -- 1 tab(s) by mouth once a day  -- Indication: For CAD (coronary artery disease)    ALPRAZolam 0.5 mg oral tablet  -- Indication: For Anxiety    Metoprolol Succinate ER 25 mg oral tablet, extended release  -- 1 tab(s) by mouth once a day  -- Indication: For CAD (coronary artery disease)    raNITIdine 150 mg oral capsule  -- 1 cap(s) by mouth 2 times a day  -- Indication: For GERD (gastroesophageal reflux disease)    Carafate  -- 1 gram(s) by mouth 1 to 4 times a day  -- Indication: For GERD (gastroesophageal reflux disease)

## 2018-05-28 NOTE — PROGRESS NOTE ADULT - ASSESSMENT
Patient is a 77y old Female PMHx of CAD s/p stents on 3 occasions/ stent restinosis, HTN and GERD who presents with a chief complaint of Dizziness (27 May 2018 08:58)    # Dizziness likely 2/2 to recent URTI,  rule out ACS  - CT head/chest negative for acute pathology  - CE neg x 3  - Orthostatics negative  - d/c tele if ok with attending  - f/u 2d echo  - f/u TSH, hba1c, lipid profile  - cardiology c/s placed 2/2 history of stent restenosis and worsening progressive dyspnea    # CAD/ Hypertension  - continue with Amlodipine, Statin, Plavix, Lisinopril, Metoprolol    # GERD  - continue carafate, pepcid    DVT ppx- Lovenox  FULL CODE  Dispo: likely home  HH low Na diet  Ambulate as tolerated Patient is a 77y old Female PMHx of CAD s/p stents on 3 occasions/ stent restinosis, HTN and GERD who presents with a chief complaint of Dizziness while sitting, not on movement, says it was hot at home (27 May 2018 08:58)    # Dizziness likely 2/2 to recent URTI,  rule out ACS  - CT head/chest negative for acute pathology  - CE neg x 3  - Orthostatics negative  - Unlikely BPPV from hx  - f/u 2d echo  - f/u TSH, hba1c, lipid profile  - cardiology c/s placed 2/2 history of stent restenosis and worsening progressive dyspnea    # CAD/ Hypertension  - continue with Amlodipine, Statin, Plavix, Lisinopril, Metoprolol    # GERD  - continue carafate, pepcid    DVT ppx- Lovenox  FULL CODE  Dispo: likely home  HH low Na diet  Ambulate as tolerated

## 2018-05-28 NOTE — DISCHARGE NOTE ADULT - PLAN OF CARE
treatment -your CT and echo was normal  -follow up dr Mejia in 1 week  -follow up with PMD in 1-2 week  -keep hydrated  -avoid rapid rising form sitting position

## 2018-05-28 NOTE — DISCHARGE NOTE ADULT - CARE PROVIDER_API CALL
PMD,   follow up PMD in 1 week  Phone: (   )    -  Fax: (   )    -    Jeffry Alford), Cardiovascular Disease; Interventional Cardiology  [address] [place] [address]  Phone: [phone]  Fax: [phone]

## 2018-05-28 NOTE — PROGRESS NOTE ADULT - ASSESSMENT
# DIZZINESS-recent uri- could be BPPV. telemetry monitoring. Ct head was negative  # CAD-  cardiac enzymes 3 sets were negative and hx of PCI will get echo and cardiology  evaluated patient and do not consider need for any further w/u continued  plavix and metoprolol. echo results are awaited.  # Anxiety- continue xanax.  # HTN-amlodipine and lisinopril.   DC home today if echo results are acceptable. # DIZZINESS-recent uri- could be BPPV. telemetry monitoring. Ct head was negative  # CAD-  cardiac enzymes 3 sets were negative and hx of PCI will get echo and cardiology  evaluated patient and do not consider need for any further w/u continued  plavix and metoprolol. echo results are awaited.  # Anxiety- continue xanax.  # HTN-amlodipine and lisinopril.   DC home today if echo results are acceptable.spent more than 30mins

## 2018-06-01 DIAGNOSIS — Z95.5 PRESENCE OF CORONARY ANGIOPLASTY IMPLANT AND GRAFT: ICD-10-CM

## 2018-06-01 DIAGNOSIS — F41.9 ANXIETY DISORDER, UNSPECIFIED: ICD-10-CM

## 2018-06-01 DIAGNOSIS — Z96.651 PRESENCE OF RIGHT ARTIFICIAL KNEE JOINT: ICD-10-CM

## 2018-06-01 DIAGNOSIS — R06.00 DYSPNEA, UNSPECIFIED: ICD-10-CM

## 2018-06-01 DIAGNOSIS — R42 DIZZINESS AND GIDDINESS: ICD-10-CM

## 2018-06-01 DIAGNOSIS — R55 SYNCOPE AND COLLAPSE: ICD-10-CM

## 2018-06-01 DIAGNOSIS — I10 ESSENTIAL (PRIMARY) HYPERTENSION: ICD-10-CM

## 2018-06-01 DIAGNOSIS — E78.5 HYPERLIPIDEMIA, UNSPECIFIED: ICD-10-CM

## 2018-06-01 DIAGNOSIS — I25.10 ATHEROSCLEROTIC HEART DISEASE OF NATIVE CORONARY ARTERY WITHOUT ANGINA PECTORIS: ICD-10-CM

## 2018-06-01 DIAGNOSIS — K21.9 GASTRO-ESOPHAGEAL REFLUX DISEASE WITHOUT ESOPHAGITIS: ICD-10-CM

## 2018-09-04 ENCOUNTER — OUTPATIENT (OUTPATIENT)
Dept: OUTPATIENT SERVICES | Facility: HOSPITAL | Age: 78
LOS: 1 days | Discharge: HOME | End: 2018-09-04

## 2018-09-04 DIAGNOSIS — E55.9 VITAMIN D DEFICIENCY, UNSPECIFIED: ICD-10-CM

## 2018-09-04 DIAGNOSIS — Z90.49 ACQUIRED ABSENCE OF OTHER SPECIFIED PARTS OF DIGESTIVE TRACT: Chronic | ICD-10-CM

## 2018-09-04 DIAGNOSIS — Z02.89 ENCOUNTER FOR OTHER ADMINISTRATIVE EXAMINATIONS: ICD-10-CM

## 2018-09-04 DIAGNOSIS — R79.82 ELEVATED C-REACTIVE PROTEIN (CRP): ICD-10-CM

## 2018-09-04 DIAGNOSIS — R74.8 ABNORMAL LEVELS OF OTHER SERUM ENZYMES: ICD-10-CM

## 2018-09-04 DIAGNOSIS — Z90.710 ACQUIRED ABSENCE OF BOTH CERVIX AND UTERUS: Chronic | ICD-10-CM

## 2018-09-04 DIAGNOSIS — Z96.651 PRESENCE OF RIGHT ARTIFICIAL KNEE JOINT: Chronic | ICD-10-CM

## 2018-09-04 DIAGNOSIS — E78.4 OTHER HYPERLIPIDEMIA: ICD-10-CM

## 2018-09-04 DIAGNOSIS — R73.09 OTHER ABNORMAL GLUCOSE: ICD-10-CM

## 2018-09-04 DIAGNOSIS — E78.1 PURE HYPERGLYCERIDEMIA: ICD-10-CM

## 2018-09-04 PROBLEM — F41.9 ANXIETY DISORDER, UNSPECIFIED: Chronic | Status: ACTIVE | Noted: 2018-05-27

## 2018-09-04 PROBLEM — I25.10 ATHEROSCLEROTIC HEART DISEASE OF NATIVE CORONARY ARTERY WITHOUT ANGINA PECTORIS: Chronic | Status: ACTIVE | Noted: 2018-05-27

## 2018-09-04 PROBLEM — K21.9 GASTRO-ESOPHAGEAL REFLUX DISEASE WITHOUT ESOPHAGITIS: Chronic | Status: ACTIVE | Noted: 2018-05-27

## 2018-09-04 PROBLEM — I10 ESSENTIAL (PRIMARY) HYPERTENSION: Chronic | Status: ACTIVE | Noted: 2018-05-27

## 2018-11-09 ENCOUNTER — OUTPATIENT (OUTPATIENT)
Dept: OUTPATIENT SERVICES | Facility: HOSPITAL | Age: 78
LOS: 1 days | Discharge: HOME | End: 2018-11-09

## 2018-11-09 DIAGNOSIS — Z90.49 ACQUIRED ABSENCE OF OTHER SPECIFIED PARTS OF DIGESTIVE TRACT: Chronic | ICD-10-CM

## 2018-11-09 DIAGNOSIS — Z90.710 ACQUIRED ABSENCE OF BOTH CERVIX AND UTERUS: Chronic | ICD-10-CM

## 2018-11-09 DIAGNOSIS — Z96.651 PRESENCE OF RIGHT ARTIFICIAL KNEE JOINT: Chronic | ICD-10-CM

## 2018-11-09 DIAGNOSIS — Z12.31 ENCOUNTER FOR SCREENING MAMMOGRAM FOR MALIGNANT NEOPLASM OF BREAST: ICD-10-CM

## 2019-06-13 ENCOUNTER — EMERGENCY (EMERGENCY)
Facility: HOSPITAL | Age: 79
LOS: 0 days | Discharge: HOME | End: 2019-06-14
Attending: EMERGENCY MEDICINE | Admitting: EMERGENCY MEDICINE
Payer: MEDICARE

## 2019-06-13 VITALS
OXYGEN SATURATION: 98 % | HEART RATE: 95 BPM | RESPIRATION RATE: 20 BRPM | TEMPERATURE: 97 F | SYSTOLIC BLOOD PRESSURE: 145 MMHG | DIASTOLIC BLOOD PRESSURE: 76 MMHG

## 2019-06-13 DIAGNOSIS — I25.10 ATHEROSCLEROTIC HEART DISEASE OF NATIVE CORONARY ARTERY WITHOUT ANGINA PECTORIS: ICD-10-CM

## 2019-06-13 DIAGNOSIS — E78.5 HYPERLIPIDEMIA, UNSPECIFIED: ICD-10-CM

## 2019-06-13 DIAGNOSIS — Z90.49 ACQUIRED ABSENCE OF OTHER SPECIFIED PARTS OF DIGESTIVE TRACT: Chronic | ICD-10-CM

## 2019-06-13 DIAGNOSIS — R42 DIZZINESS AND GIDDINESS: ICD-10-CM

## 2019-06-13 DIAGNOSIS — Z96.651 PRESENCE OF RIGHT ARTIFICIAL KNEE JOINT: Chronic | ICD-10-CM

## 2019-06-13 DIAGNOSIS — Z79.899 OTHER LONG TERM (CURRENT) DRUG THERAPY: ICD-10-CM

## 2019-06-13 DIAGNOSIS — K21.9 GASTRO-ESOPHAGEAL REFLUX DISEASE WITHOUT ESOPHAGITIS: ICD-10-CM

## 2019-06-13 DIAGNOSIS — Z90.710 ACQUIRED ABSENCE OF BOTH CERVIX AND UTERUS: Chronic | ICD-10-CM

## 2019-06-13 DIAGNOSIS — I10 ESSENTIAL (PRIMARY) HYPERTENSION: ICD-10-CM

## 2019-06-13 PROCEDURE — 99285 EMERGENCY DEPT VISIT HI MDM: CPT | Mod: 25

## 2019-06-14 LAB
ALBUMIN SERPL ELPH-MCNC: 4.5 G/DL — SIGNIFICANT CHANGE UP (ref 3.5–5.2)
ALP SERPL-CCNC: 82 U/L — SIGNIFICANT CHANGE UP (ref 30–115)
ALT FLD-CCNC: 14 U/L — SIGNIFICANT CHANGE UP (ref 0–41)
ANION GAP SERPL CALC-SCNC: 13 MMOL/L — SIGNIFICANT CHANGE UP (ref 7–14)
APPEARANCE UR: ABNORMAL
AST SERPL-CCNC: 14 U/L — SIGNIFICANT CHANGE UP (ref 0–41)
BACTERIA # UR AUTO: ABNORMAL /HPF
BILIRUB SERPL-MCNC: 0.3 MG/DL — SIGNIFICANT CHANGE UP (ref 0.2–1.2)
BILIRUB UR-MCNC: NEGATIVE — SIGNIFICANT CHANGE UP
BUN SERPL-MCNC: 20 MG/DL — SIGNIFICANT CHANGE UP (ref 10–20)
CALCIUM SERPL-MCNC: 9.7 MG/DL — SIGNIFICANT CHANGE UP (ref 8.5–10.1)
CHLORIDE SERPL-SCNC: 100 MMOL/L — SIGNIFICANT CHANGE UP (ref 98–110)
CO2 SERPL-SCNC: 29 MMOL/L — SIGNIFICANT CHANGE UP (ref 17–32)
COLOR SPEC: YELLOW — SIGNIFICANT CHANGE UP
COMMENT - URINE: SIGNIFICANT CHANGE UP
CREAT SERPL-MCNC: 0.9 MG/DL — SIGNIFICANT CHANGE UP (ref 0.7–1.5)
DIFF PNL FLD: NEGATIVE — SIGNIFICANT CHANGE UP
GLUCOSE SERPL-MCNC: 138 MG/DL — HIGH (ref 70–99)
GLUCOSE UR QL: NEGATIVE MG/DL — SIGNIFICANT CHANGE UP
HCT VFR BLD CALC: 41.1 % — SIGNIFICANT CHANGE UP (ref 37–47)
HGB BLD-MCNC: 13.7 G/DL — SIGNIFICANT CHANGE UP (ref 12–16)
KETONES UR-MCNC: ABNORMAL
LACTATE SERPL-SCNC: 1 MMOL/L — SIGNIFICANT CHANGE UP (ref 0.5–2.2)
LEUKOCYTE ESTERASE UR-ACNC: NEGATIVE — SIGNIFICANT CHANGE UP
MAGNESIUM SERPL-MCNC: 2.4 MG/DL — SIGNIFICANT CHANGE UP (ref 1.8–2.4)
MCHC RBC-ENTMCNC: 31.9 PG — HIGH (ref 27–31)
MCHC RBC-ENTMCNC: 33.3 G/DL — SIGNIFICANT CHANGE UP (ref 32–37)
MCV RBC AUTO: 95.6 FL — SIGNIFICANT CHANGE UP (ref 81–99)
NITRITE UR-MCNC: NEGATIVE — SIGNIFICANT CHANGE UP
NRBC # BLD: 0 /100 WBCS — SIGNIFICANT CHANGE UP (ref 0–0)
PH UR: 8 — SIGNIFICANT CHANGE UP (ref 5–8)
PLATELET # BLD AUTO: 259 K/UL — SIGNIFICANT CHANGE UP (ref 130–400)
POTASSIUM SERPL-MCNC: 4.3 MMOL/L — SIGNIFICANT CHANGE UP (ref 3.5–5)
POTASSIUM SERPL-SCNC: 4.3 MMOL/L — SIGNIFICANT CHANGE UP (ref 3.5–5)
PROT SERPL-MCNC: 7 G/DL — SIGNIFICANT CHANGE UP (ref 6–8)
PROT UR-MCNC: NEGATIVE MG/DL — SIGNIFICANT CHANGE UP
RBC # BLD: 4.3 M/UL — SIGNIFICANT CHANGE UP (ref 4.2–5.4)
RBC # FLD: 13.1 % — SIGNIFICANT CHANGE UP (ref 11.5–14.5)
SODIUM SERPL-SCNC: 142 MMOL/L — SIGNIFICANT CHANGE UP (ref 135–146)
SP GR SPEC: 1.01 — SIGNIFICANT CHANGE UP (ref 1.01–1.03)
TROPONIN T SERPL-MCNC: <0.01 NG/ML — SIGNIFICANT CHANGE UP
UROBILINOGEN FLD QL: 0.2 MG/DL — SIGNIFICANT CHANGE UP (ref 0.2–0.2)
WBC # BLD: 11.71 K/UL — HIGH (ref 4.8–10.8)
WBC # FLD AUTO: 11.71 K/UL — HIGH (ref 4.8–10.8)

## 2019-06-14 PROCEDURE — 71045 X-RAY EXAM CHEST 1 VIEW: CPT | Mod: 26

## 2019-06-14 PROCEDURE — 93010 ELECTROCARDIOGRAM REPORT: CPT

## 2019-06-14 PROCEDURE — 70450 CT HEAD/BRAIN W/O DYE: CPT | Mod: 26

## 2019-06-14 RX ORDER — MECLIZINE HCL 12.5 MG
25 TABLET ORAL ONCE
Refills: 0 | Status: COMPLETED | OUTPATIENT
Start: 2019-06-14 | End: 2019-06-14

## 2019-06-14 RX ORDER — MECLIZINE HCL 12.5 MG
1 TABLET ORAL
Qty: 10 | Refills: 0
Start: 2019-06-14 | End: 2019-06-18

## 2019-06-14 RX ORDER — SODIUM CHLORIDE 9 MG/ML
1000 INJECTION, SOLUTION INTRAVENOUS ONCE
Refills: 0 | Status: COMPLETED | OUTPATIENT
Start: 2019-06-14 | End: 2019-06-14

## 2019-06-14 RX ORDER — ONDANSETRON 8 MG/1
4 TABLET, FILM COATED ORAL ONCE
Refills: 0 | Status: COMPLETED | OUTPATIENT
Start: 2019-06-14 | End: 2019-06-14

## 2019-06-14 RX ADMIN — Medication 25 MILLIGRAM(S): at 01:30

## 2019-06-14 RX ADMIN — ONDANSETRON 4 MILLIGRAM(S): 8 TABLET, FILM COATED ORAL at 01:30

## 2019-06-14 RX ADMIN — SODIUM CHLORIDE 2000 MILLILITER(S): 9 INJECTION, SOLUTION INTRAVENOUS at 01:29

## 2019-06-14 NOTE — ED PROVIDER NOTE - OBJECTIVE STATEMENT
78 y f pmh htn, hld, cad s/p stents pw dizziness. Dizziness since 10 pm last night. Started at rest. No sudden head movements or trauma. Feels like the room is moving. Associated with R sided head pressure and ear pressure for the past week and several episodes nbnb vomiting. Feels like she is swaying and moving while she walks. Denies fever, chills, cp, back pain, palpitations, sob, abd pain, diarrhea, syncope, LH.

## 2019-06-14 NOTE — ED PROVIDER NOTE - PHYSICAL EXAMINATION
CONSTITUTIONAL: Well-developed; well-nourished; in no acute distress.   SKIN: warm, dry  HEAD: Normocephalic; atraumatic.  EYES: PERRL, EOMI, normal sclera and conjunctiva. R sided nystagmus. No direction changing nystagmus, no vertical or rotary nystagmus.   ENT: No nasal discharge; airway clear.  NECK: Supple; non tender.  CARD: S1, S2 normal; no murmurs, gallops, or rubs. Regular rate and rhythm.   RESP: No wheezes, rales or rhonchi.  ABD: soft ntnd  EXT: Normal ROM.  No clubbing, cyanosis or edema.   LYMPH: No acute cervical adenopathy.  NEURO: Alert, oriented, grossly unremarkable. No cranial nerve deficits. Moving all extremities with no drift. Normal cerebellar coordination.   PSYCH: Cooperative, appropriate.

## 2019-06-14 NOTE — ED PROVIDER NOTE - ATTENDING CONTRIBUTION TO CARE
I personally evaluated the patient. I reviewed the Resident’s or Physician Assistant’s note (as assigned above), and agree with the findings and plan except as documented in my note.    79 y/o F with PMH as listed presents w r sided ear ringing and vertigo like sym,ptoms for 1 day. No HA. No nausea, vomiting. No neck pain. No CP, SOB.     CONSTITUTIONAL: Well-developed; well-nourished; in no acute distress. Sitting up and providing appropriate history and physical examination  SKIN: skin exam is warm and dry, no acute rash.  HEAD: Normocephalic; atraumatic.  EYES: PERRL, 3 mm bilateral, no nystagmus, EOM intact; conjunctiva and sclera clear.  ENT: No nasal discharge; airway clear.  NECK: Supple; non tender.+ full passive ROM in all directions. No JVD  CARD: S1, S2 normal; no murmurs, gallops, or rubs. Regular rate and rhythm. + Symmetric Strong Pulses  RESP: No wheezes, rales or rhonchi. Good air movement bilaterally  ABD: soft; non-distended; non-tender. No Rebound, No Gaurding, No signs of peritnitis, No CVA tenderness  EXT: Normal ROM. No clubbing, cyanosis or edema. Dp and Pt Pulses intact. Cap refill less than 3 seconds  NEURO: CN 2-12 intact, normal finger to nose, normal romberg, stable gait, no sensory or motor deficits, Alert, oriented, grossly unremarkable. No Focal deficits. GCS 15. NIH 0  PSYCH: Cooperative, appropriate.    Plan- meclizine, labs, CT head, reassess

## 2019-06-14 NOTE — ED PROVIDER NOTE - NS ED ROS FT
Eyes:  No visual changes, eye pain or discharge.  ENMT:  No hearing changes, pain, discharge or infections. No neck pain or stiffness.  Cardiac:  No chest pain, SOB or edema. No chest pain with exertion.  Respiratory:  No cough or respiratory distress.   GI:  N/V. No diarrhea or abdominal pain.  :  No dysuria, frequency or burning.  MS:  No myalgia, muscle weakness, joint pain or back pain.  Neuro:  Headache, dizziness. No LOC, weakness.   Skin:  No skin rash.   Endocrine: No history of thyroid disease or diabetes.

## 2019-06-14 NOTE — ED PROVIDER NOTE - CLINICAL SUMMARY MEDICAL DECISION MAKING FREE TEXT BOX
I have full discussed the medical management and delivery of care with the patient. Patient confirms understanding and has been given detailed return precautions. Patient instructed to return to the ED should symptoms persist or worsen. Patient is well appearing upon discharge. Vertigo resolved. Pt eager to go home. Gait normal. Ambulating in ED. Extensive return precautions provided. Pt agreed to come back should the symptoms recur. I have full discussed the medical management and delivery of care with the patient. Patient confirms understanding and has been given detailed return precautions. Patient instructed to return to the ED should symptoms persist or worsen. Patient is well appearing upon discharge.

## 2019-09-24 ENCOUNTER — OUTPATIENT (OUTPATIENT)
Dept: OUTPATIENT SERVICES | Facility: HOSPITAL | Age: 79
LOS: 1 days | Discharge: HOME | End: 2019-09-24

## 2019-09-24 DIAGNOSIS — N39.0 URINARY TRACT INFECTION, SITE NOT SPECIFIED: ICD-10-CM

## 2019-09-24 DIAGNOSIS — Z96.651 PRESENCE OF RIGHT ARTIFICIAL KNEE JOINT: Chronic | ICD-10-CM

## 2019-09-24 DIAGNOSIS — Z90.710 ACQUIRED ABSENCE OF BOTH CERVIX AND UTERUS: Chronic | ICD-10-CM

## 2019-09-24 DIAGNOSIS — Z90.49 ACQUIRED ABSENCE OF OTHER SPECIFIED PARTS OF DIGESTIVE TRACT: Chronic | ICD-10-CM

## 2019-09-26 ENCOUNTER — OUTPATIENT (OUTPATIENT)
Dept: OUTPATIENT SERVICES | Facility: HOSPITAL | Age: 79
LOS: 1 days | Discharge: HOME | End: 2019-09-26

## 2019-09-26 DIAGNOSIS — I25.10 ATHEROSCLEROTIC HEART DISEASE OF NATIVE CORONARY ARTERY WITHOUT ANGINA PECTORIS: ICD-10-CM

## 2019-09-26 DIAGNOSIS — R73.09 OTHER ABNORMAL GLUCOSE: ICD-10-CM

## 2019-09-26 DIAGNOSIS — Z90.49 ACQUIRED ABSENCE OF OTHER SPECIFIED PARTS OF DIGESTIVE TRACT: Chronic | ICD-10-CM

## 2019-09-26 DIAGNOSIS — Z96.651 PRESENCE OF RIGHT ARTIFICIAL KNEE JOINT: Chronic | ICD-10-CM

## 2019-09-26 DIAGNOSIS — R53.83 OTHER FATIGUE: ICD-10-CM

## 2019-09-26 DIAGNOSIS — Z90.710 ACQUIRED ABSENCE OF BOTH CERVIX AND UTERUS: Chronic | ICD-10-CM

## 2019-09-26 DIAGNOSIS — K22.70 BARRETT'S ESOPHAGUS WITHOUT DYSPLASIA: ICD-10-CM

## 2019-09-26 DIAGNOSIS — M19.90 UNSPECIFIED OSTEOARTHRITIS, UNSPECIFIED SITE: ICD-10-CM

## 2020-03-10 ENCOUNTER — APPOINTMENT (OUTPATIENT)
Dept: CARDIOLOGY | Facility: CLINIC | Age: 80
End: 2020-03-10
Payer: MEDICARE

## 2020-03-10 VITALS
SYSTOLIC BLOOD PRESSURE: 140 MMHG | WEIGHT: 156 LBS | HEART RATE: 76 BPM | BODY MASS INDEX: 26.63 KG/M2 | HEIGHT: 64 IN | DIASTOLIC BLOOD PRESSURE: 74 MMHG

## 2020-03-10 PROBLEM — Z00.00 ENCOUNTER FOR PREVENTIVE HEALTH EXAMINATION: Status: ACTIVE | Noted: 2020-03-10

## 2020-03-10 PROCEDURE — 99204 OFFICE O/P NEW MOD 45 MIN: CPT

## 2020-03-10 PROCEDURE — 93000 ELECTROCARDIOGRAM COMPLETE: CPT

## 2020-03-10 NOTE — HISTORY OF PRESENT ILLNESS
[FreeTextEntry1] : 79 year-old female presents to [facility] care.\par Previously followed with Dr. Alford.\par \par Cardiac history:\par CAD s/p PCI LAD / CX (2007).\par Repeat PCI 2011\par Murmur\par \par Worsening exertional dyspnea / fatigue x several months.  Difficulty with steps.  Breathing comfortable at rest.\par \par Vague chest discomfort.  Somewhat reminiscent of prior angina, but less intense.  Usually exertional, but occasionally at rest.\par \par Nocturnal palpitations in bed.\par \par Mild brief lightheadedness.  No syncope.\par \par Notably, admitted baseline anxiety is worse secondary to the [place] of [place] with [place].

## 2020-03-10 NOTE — ASSESSMENT
[FreeTextEntry1] : CAD s/p PCI (2007 / 2011).\par Possible recurrent angina.\par \par Systolic murmur.\par \par BP controlled.\par \par Senior female with CAD and likely valve disease with exertional dyspnea / vague chest discomfort.

## 2020-03-10 NOTE — DISCUSSION/SUMMARY
[FreeTextEntry1] : Cont ASA\par Cont Amlodipine-Olmesartan.\par ECHO\par Cath v. Stress Test pending ECHO.\par Follow-up 3-months (pending testing).\par

## 2020-06-11 ENCOUNTER — APPOINTMENT (OUTPATIENT)
Dept: CARDIOLOGY | Facility: CLINIC | Age: 80
End: 2020-06-11
Payer: MEDICARE

## 2020-06-11 PROCEDURE — 93306 TTE W/DOPPLER COMPLETE: CPT

## 2020-06-22 ENCOUNTER — APPOINTMENT (OUTPATIENT)
Dept: CARDIOLOGY | Facility: CLINIC | Age: 80
End: 2020-06-22
Payer: MEDICARE

## 2020-06-22 DIAGNOSIS — Z86.79 PERSONAL HISTORY OF OTHER DISEASES OF THE CIRCULATORY SYSTEM: ICD-10-CM

## 2020-06-22 PROCEDURE — 99442: CPT

## 2020-06-29 PROBLEM — Z86.79 HISTORY OF CARDIAC MURMUR: Status: RESOLVED | Noted: 2020-03-10 | Resolved: 2020-06-29

## 2020-06-29 NOTE — HISTORY OF PRESENT ILLNESS
[FreeTextEntry1] : 79 year-old female presents to \[facility]\"" care.\par Previously followed with Dr. Alford.\par \par Cardiac history:\par CAD s/p PCI LAD / CX (2007).\par Repeat PCI 2011\par Murmur\par \par Worsening exertional dyspnea / fatigue x several months.  Difficulty with steps.  Breathing comfortable at rest.\par \par Vague chest discomfort.  Somewhat reminiscent of prior angina, but less intense.  Usually exertional, but occasionally at rest.\par \par Nocturnal palpitations in bed.\par \par Mild brief lightheadedness.  No syncope.\par \par Notably, admitted baseline anxiety is worse secondary to the [place] of [place].

## 2020-06-29 NOTE — REASON FOR VISIT
[Follow-Up - Clinic] : a clinic follow-up of [Coronary Artery Disease] : coronary artery disease [FreeTextEntry1] : The patient, Radha Montoya, and I participated in a Telehealth encounter (phone).\par Patient provided verbal consent for visit.\par \par Feels better.\par \par Chest pain / palpitations improved.  Associated with anxiety associated with potential move [place]state near daughter.\par \par Stable mild exertional dyspnea.  Breathing otherwise comfortable.\par \par No lightheadedness / syncope.\par \par Tolerating Rx.\par \par ECHO (6/11/20): nL LVSF.  Mild to Mos AI / AS.  Mild TR.

## 2020-06-29 NOTE — DISCUSSION/SUMMARY
[FreeTextEntry1] : Cont ASA / Zetia.\par Cont Amlodipine-Olmesartan.\par Follow-up 3-months.\par \par 15-minute phone conversation.

## 2020-06-29 NOTE — ASSESSMENT
[FreeTextEntry1] : CAD s/p PCI (2007 / 2011).\par Chest pain improved.  nL LVSF.\par \par Mild to Mod AS / AI.\par ECHO (6/11/20): nL LVSF.  Mild to Mos AI / AS.  Mild TR.\par \par BP recently controlled.

## 2020-08-30 ENCOUNTER — INPATIENT (INPATIENT)
Facility: HOSPITAL | Age: 80
LOS: 3 days | Discharge: HOME | End: 2020-09-03
Attending: INTERNAL MEDICINE | Admitting: INTERNAL MEDICINE
Payer: MEDICARE

## 2020-08-30 VITALS
DIASTOLIC BLOOD PRESSURE: 72 MMHG | OXYGEN SATURATION: 96 % | SYSTOLIC BLOOD PRESSURE: 158 MMHG | TEMPERATURE: 96 F | RESPIRATION RATE: 18 BRPM | HEART RATE: 89 BPM | WEIGHT: 154.98 LBS

## 2020-08-30 DIAGNOSIS — Z96.651 PRESENCE OF RIGHT ARTIFICIAL KNEE JOINT: Chronic | ICD-10-CM

## 2020-08-30 DIAGNOSIS — Z90.710 ACQUIRED ABSENCE OF BOTH CERVIX AND UTERUS: Chronic | ICD-10-CM

## 2020-08-30 DIAGNOSIS — Z90.49 ACQUIRED ABSENCE OF OTHER SPECIFIED PARTS OF DIGESTIVE TRACT: Chronic | ICD-10-CM

## 2020-08-30 LAB
ALBUMIN SERPL ELPH-MCNC: 4.7 G/DL — SIGNIFICANT CHANGE UP (ref 3.5–5.2)
ALP SERPL-CCNC: 78 U/L — SIGNIFICANT CHANGE UP (ref 30–115)
ALT FLD-CCNC: 17 U/L — SIGNIFICANT CHANGE UP (ref 0–41)
ANION GAP SERPL CALC-SCNC: 11 MMOL/L — SIGNIFICANT CHANGE UP (ref 7–14)
AST SERPL-CCNC: 21 U/L — SIGNIFICANT CHANGE UP (ref 0–41)
BASOPHILS # BLD AUTO: 0.03 K/UL — SIGNIFICANT CHANGE UP (ref 0–0.2)
BASOPHILS NFR BLD AUTO: 0.5 % — SIGNIFICANT CHANGE UP (ref 0–1)
BILIRUB SERPL-MCNC: 0.7 MG/DL — SIGNIFICANT CHANGE UP (ref 0.2–1.2)
BUN SERPL-MCNC: 17 MG/DL — SIGNIFICANT CHANGE UP (ref 10–20)
CALCIUM SERPL-MCNC: 9.5 MG/DL — SIGNIFICANT CHANGE UP (ref 8.5–10.1)
CHLORIDE SERPL-SCNC: 105 MMOL/L — SIGNIFICANT CHANGE UP (ref 98–110)
CO2 SERPL-SCNC: 26 MMOL/L — SIGNIFICANT CHANGE UP (ref 17–32)
CREAT SERPL-MCNC: 0.7 MG/DL — SIGNIFICANT CHANGE UP (ref 0.7–1.5)
EOSINOPHIL # BLD AUTO: 0.11 K/UL — SIGNIFICANT CHANGE UP (ref 0–0.7)
EOSINOPHIL NFR BLD AUTO: 2 % — SIGNIFICANT CHANGE UP (ref 0–8)
GLUCOSE SERPL-MCNC: 101 MG/DL — HIGH (ref 70–99)
HCT VFR BLD CALC: 38.7 % — SIGNIFICANT CHANGE UP (ref 37–47)
HGB BLD-MCNC: 12.7 G/DL — SIGNIFICANT CHANGE UP (ref 12–16)
IMM GRANULOCYTES NFR BLD AUTO: 0.2 % — SIGNIFICANT CHANGE UP (ref 0.1–0.3)
LYMPHOCYTES # BLD AUTO: 1.41 K/UL — SIGNIFICANT CHANGE UP (ref 1.2–3.4)
LYMPHOCYTES # BLD AUTO: 25.3 % — SIGNIFICANT CHANGE UP (ref 20.5–51.1)
MCHC RBC-ENTMCNC: 32.7 PG — HIGH (ref 27–31)
MCHC RBC-ENTMCNC: 32.8 G/DL — SIGNIFICANT CHANGE UP (ref 32–37)
MCV RBC AUTO: 99.7 FL — HIGH (ref 81–99)
MONOCYTES # BLD AUTO: 0.6 K/UL — SIGNIFICANT CHANGE UP (ref 0.1–0.6)
MONOCYTES NFR BLD AUTO: 10.8 % — HIGH (ref 1.7–9.3)
NEUTROPHILS # BLD AUTO: 3.41 K/UL — SIGNIFICANT CHANGE UP (ref 1.4–6.5)
NEUTROPHILS NFR BLD AUTO: 61.2 % — SIGNIFICANT CHANGE UP (ref 42.2–75.2)
NRBC # BLD: 0 /100 WBCS — SIGNIFICANT CHANGE UP (ref 0–0)
PLATELET # BLD AUTO: 260 K/UL — SIGNIFICANT CHANGE UP (ref 130–400)
POTASSIUM SERPL-MCNC: 4.3 MMOL/L — SIGNIFICANT CHANGE UP (ref 3.5–5)
POTASSIUM SERPL-SCNC: 4.3 MMOL/L — SIGNIFICANT CHANGE UP (ref 3.5–5)
PROT SERPL-MCNC: 6.7 G/DL — SIGNIFICANT CHANGE UP (ref 6–8)
RBC # BLD: 3.88 M/UL — LOW (ref 4.2–5.4)
RBC # FLD: 13.5 % — SIGNIFICANT CHANGE UP (ref 11.5–14.5)
SODIUM SERPL-SCNC: 142 MMOL/L — SIGNIFICANT CHANGE UP (ref 135–146)
WBC # BLD: 5.57 K/UL — SIGNIFICANT CHANGE UP (ref 4.8–10.8)
WBC # FLD AUTO: 5.57 K/UL — SIGNIFICANT CHANGE UP (ref 4.8–10.8)

## 2020-08-30 PROCEDURE — 99285 EMERGENCY DEPT VISIT HI MDM: CPT

## 2020-08-30 PROCEDURE — ZZZZZ: CPT

## 2020-08-30 PROCEDURE — 93970 EXTREMITY STUDY: CPT | Mod: 26

## 2020-08-30 PROCEDURE — 73590 X-RAY EXAM OF LOWER LEG: CPT | Mod: 26,RT

## 2020-08-30 PROCEDURE — 73552 X-RAY EXAM OF FEMUR 2/>: CPT | Mod: 26,RT

## 2020-08-30 PROCEDURE — 73562 X-RAY EXAM OF KNEE 3: CPT | Mod: 26,RT

## 2020-08-30 PROCEDURE — 73701 CT LOWER EXTREMITY W/DYE: CPT | Mod: 26,RT

## 2020-08-30 RX ORDER — ASPIRIN/CALCIUM CARB/MAGNESIUM 324 MG
81 TABLET ORAL DAILY
Refills: 0 | Status: DISCONTINUED | OUTPATIENT
Start: 2020-08-30 | End: 2020-09-03

## 2020-08-30 RX ORDER — ACETAMINOPHEN 500 MG
650 TABLET ORAL ONCE
Refills: 0 | Status: COMPLETED | OUTPATIENT
Start: 2020-08-30 | End: 2020-08-30

## 2020-08-30 RX ORDER — KETOROLAC TROMETHAMINE 30 MG/ML
15 SYRINGE (ML) INJECTION ONCE
Refills: 0 | Status: DISCONTINUED | OUTPATIENT
Start: 2020-08-30 | End: 2020-08-30

## 2020-08-30 RX ORDER — ENOXAPARIN SODIUM 100 MG/ML
40 INJECTION SUBCUTANEOUS AT BEDTIME
Refills: 0 | Status: DISCONTINUED | OUTPATIENT
Start: 2020-08-30 | End: 2020-09-01

## 2020-08-30 RX ORDER — ACETAMINOPHEN 500 MG
975 TABLET ORAL ONCE
Refills: 0 | Status: COMPLETED | OUTPATIENT
Start: 2020-08-30 | End: 2020-08-30

## 2020-08-30 RX ADMIN — Medication 975 MILLIGRAM(S): at 09:10

## 2020-08-30 RX ADMIN — Medication 650 MILLIGRAM(S): at 20:50

## 2020-08-30 RX ADMIN — Medication 15 MILLIGRAM(S): at 20:40

## 2020-08-30 NOTE — H&P ADULT - NSHPLABSRESULTS_GEN_ALL_CORE
LABS:                          12.7   5.57  )-----------( 260      ( 30 Aug 2020 09:50 )             38.7     08-30    142  |  105  |  17  ----------------------------<  101<H>  4.3   |  26  |  0.7    Ca    9.5      30 Aug 2020 09:50    TPro  6.7  /  Alb  4.7  /  TBili  0.7  /  DBili  x   /  AST  21  /  ALT  17  /  AlkPhos  78  08-30            RADIOLOGY:    < from: CT Lower Extremity w/ IV Cont, Right (08.30.20 @ 13:00) >    IMPRESSION:    Approximately 2.5 x 2.5 x 4 cm intramuscular complex process posterior to the proximal tibia.    Differential includes hematoma, phlegmon or soft tissue neoplasm. Findings may represent a complex Baker's cyst    Further evaluation with a postcontrast MRI is recommended for further characterization    Moderate supra patellar joint effusion with rim enhancement, possibly postoperative in nature, however, infected fluid cannot be excluded    Multiple areas of narrowing within the anterior tibial artery, incompletely evaluated on this examination. If indicated, a CT angiogram of the right lower extremity can be performed on an outpatient basis.      < from: VA Duplex Lower Ext Vein Scan, Bilat (08.30.20 @ 09:05) >    Impression:    No evidence of deep venous thrombosis or superficial thrombophlebitis in the bilateral lower extremities.  Right knee nonspecific fluid collection 1.6 x 1.9 x 2.2 cm  Left popliteal fossa Baker's cyst 1.1 x 1.8 x 3.1 cm

## 2020-08-30 NOTE — H&P ADULT - NSHPSOCIALHISTORY_GEN_ALL_CORE
Denies Smoking Tobacco and illicit drugs  Endorses Occasional red wine with dinner  Lives home alone  Retired  Denies Smoking Tobacco and illicit drugs  Endorses Occasional red wine with dinner  Lives home alone-   Retired

## 2020-08-30 NOTE — H&P ADULT - NSICDXPASTSURGICALHX_GEN_ALL_CORE_FT
PAST SURGICAL HISTORY:  H/O abdominal hysterectomy     H/O total knee replacement, right     S/P cholecystectomy

## 2020-08-30 NOTE — H&P ADULT - NSHPPHYSICALEXAM_GEN_ALL_CORE
General: NAD, resting comfortably In bed     Heart: S1/S2 appreciated, RRR, no murmurs     Lungs: CTA b/l, no adventitious sounds     Abdomen: soft, NT, Nd     Musculoskeletal: Atraumatic, + right calf pain to palpation, Bruising noted     Neuro: AO x 4, no focal deficits

## 2020-08-30 NOTE — H&P ADULT - NSICDXPASTMEDICALHX_GEN_ALL_CORE_FT
PAST MEDICAL HISTORY:  Anxiety     CAD (coronary artery disease)     GERD (gastroesophageal reflux disease)     Hypertension

## 2020-08-30 NOTE — H&P ADULT - ASSESSMENT
Patient is a 80 year old female with pmh of htn and CAD (s/p stents 2007-on ASA only) and R knee replacement (3 years ago) who presents with Right posterior leg pain.    Right Posterior Leg pain is likely secondary to Ruptured Baker cyst given an  active baker cyst on Left Extremity   Patient denies trauma and Anticoagulation therapy, DVT ruled out.   Pain was gradual in onset and progressively got worse  Pain is well controlled on Tylenol and Ibuprofen  Ortho to Evaluate    CAD s/p 3 stent ( 2007. 2009 and 2011)  -Only on ASA, Plavix recently stopped by Cardiologist     Essential HTN  -BP reading on admission at goal for age   -States she takes Amlodipine, pt unsure of dose    DVT PPX: Lovenox   GI PPX: Hx of GERD??, denies PPI use   Fullcode  Dispo: lives home alone  -Pending ORTHO eval

## 2020-08-30 NOTE — ED PROVIDER NOTE - CLINICAL SUMMARY MEDICAL DECISION MAKING FREE TEXT BOX
pt found to have intramuscular fluid collection on CT scan. Burn and ortho consulted. burn recommends admission for mri and ir consult. they will follow.

## 2020-08-30 NOTE — ED PROVIDER NOTE - OBJECTIVE STATEMENT
80F PMH CAD stent, HTN, p/w 10 days atraumatic R calf pain and bruising progressively worsening. mild swelling. states woke up and noticed it. seen by pmd Dr Sosa, had doppler done as outpt which showed "fluid or cyst" burst. states after the doppler the pain got worse, difficult to ambulate. sharp constant nonradiating. no fever, redness, warmth. no cp, sob. no abd pain, nvdc. no dysuria, freq, hematuria. no cough, uri. no numbness, weakness. pt called pmd and was told to come to ED.

## 2020-08-30 NOTE — H&P ADULT - ATTENDING COMMENTS
Chart reviewed, patient examined. Pertinent results reviewed.  Case discussed with HO;   HD#1; Pt is new to me    Pt with unexplained ecchymosis RLE, and doppler , then CT show a collection in her proximal posterior R Calf/leg area in the location of her mod-sev  pain.  She is 3 yrs S/P R TKR @ [facility]. It cause severe pain especially when she is walking        HEMATOMA OF RIGHT LOWER EXTREMITY  No pertinent family history in first degree relatives        INTERVAL HPI/OVERNIGHT EVENTS:  Daily Height in cm: 165.1 (31 Aug 2020 04:34)    Daily   Vital Signs Last 24 Hrs  T(C): 35.7 (31 Aug 2020 04:34), Max: 36.1 (30 Aug 2020 19:25)  T(F): 96.3 (31 Aug 2020 04:34), Max: 97 (30 Aug 2020 19:25)  HR: 79 (31 Aug 2020 04:34) (69 - 79)  BP: 142/72 (31 Aug 2020 04:34) (140/63 - 147/67)  BP(mean): --  RR: 18 (31 Aug 2020 04:34) (18 - 20)  SpO2: 98% (31 Aug 2020 04:34) (96% - 99%)    PE:  GEN; WD, WN, NAD, A&Ox  H: AT/NC  TH: MMM;   Neck- NO, LN, JVD, Thyroid  LUNGS- Clear;  [  ] rales, [  ] rhonchi, [  ]wheezing  HT-   RR, No  R, G; 2/6 CRISTI at R&[facility]B  ABD-    [ ] obese, [ x] Flat,  BS      , NT, No HS  EXT- No CCE; R calf- evoving ecchymosis; tender in prox/med calf area w/o a discreet mass.  NEURO- see MS, CNI, No gross Focal Deficit    RADIOLOGY & ADDITIONAL TESTS:  < from: CT Lower Extremity w/ IV Cont, Right (08.30.20 @ 13:00) >      IMPRESSION:    Approximately 2.5 x 2.5 x 4 cm intramuscular complex process posterior to the proximal tibia.    Differential includes hematoma, phlegmon or soft tissue neoplasm. Findings may represent a complex Baker's cyst    Further evaluation with a postcontrast MRI is recommended for further characterization    Moderate supra patellar joint effusion with rim enhancement, possibly postoperative in nature, however, infected fluid cannot be excluded    Multiple areas of narrowing within the anterior tibial artery, incompletely evaluated on this examination. If indicated, a CT angiogram of the right lower extremity can be performed on an outpatient basis.    Spoke with MARRY PAULINO PA on 8/30/2020 3:24 PM with readback.  < end of copied text >  Imaging Personally Reviewed:  [ ] YES  [x] NO     Did speak to reading Radiologist    acetaminophen   Tablet .. 650 milliGRAM(s) Oral every 6 hours PRN  amLODIPine   Tablet 5 milliGRAM(s) Oral daily  aspirin  chewable 81 milliGRAM(s) Oral daily  chlorhexidine 4% Liquid 1 Application(s) Topical <User Schedule>  enoxaparin Injectable 40 milliGRAM(s) SubCutaneous at bedtime  lisinopril 20 milliGRAM(s) Oral daily  pantoprazole    Tablet 40 milliGRAM(s) Oral before breakfast      I/P- Lesion in proximal R Calf--Complex Cyst, vs bleed, vs infection, or mass\  --Ask ortho SVC to see and advise  --Get MR as able; w contrast  --Pain control- try 2.5 mg oxycodone prn for mod-severe pain

## 2020-08-30 NOTE — ED PROVIDER NOTE - ATTENDING CONTRIBUTION TO CARE
80F PMH CAD stent, HTN, p/w 10 days atraumatic R calf pain and bruising progressively worsening. mild swelling. states woke up and noticed it. seen by pmd Dr Sosa, had doppler done as outpt which showed "fluid or cyst" burst. states after the doppler the pain got worse, difficult to ambulate. sharp constant nonradiating. no fever, redness, warmth. no cp, sob. no abd pain, nvdc. no dysuria, freq, hematuria. no cough, uri. no numbness, weakness. pt called pmd and was told to come to ED.    on exam, AFVSS, well perez nad, ncat, eomi, perrla, mmm, lctab, rrr nl s1s2 no mrg, abd soft ntnd, aaox3, no focal deficits, no le edema, RLE calf ttp and echymosis, soft compartments, skin intact, 2+ dp pulse bilaterally     a/p; R calf pain/bruising atraumatic, will get LE doppler and discuss w pmd.

## 2020-08-30 NOTE — ED PROVIDER NOTE - NS ED ATTENDING STATEMENT MOD
" <p align=\"LEFT\"><IMG SRC=\"[email or web address]\" alt=\"Image\" WIDTH=\"50%\" HEIGHT=\"200\" BORDER=\"\"></p>                   Name:Jamie Pacheco  Medical Record Number:[id number]  CSN: [id number]    YOB: 1946   Age: 70 y.o.  Sex: male  HT:1.676 m (5' 5.98\") WT: 100.7 kg (222 lb 0.1 oz)          Admit Date: 5/5/2017     Discharge Date:   Today's Date: 5/6/2017  Attending Doctor:  Ermias Griffith M.D.                  Allergies:  Ampicillin; Clindamycin; and Demerol          Your appointments     Nov 03, 2017  8:00 AM   Established Patient with DONNA Jarquin   [address] [address] [phone]           You will be receiving a confirmation call a few days before your appointment from our automated call confirmation system.                 Medication List      Take these Medications        Instructions    * albuterol 2.5mg/3ml Nebu solution for nebulization   Commonly known as:  PROVENTIL    3 mL by Nebulization route every four hours as needed for Shortness of Breath.   Dose:  2.5 mg       * albuterol 108 (90 BASE) MCG/ACT Aers inhalation aerosol    Inhale 2 Puffs by mouth every 6 hours as needed for Shortness of Breath.   Dose:  2 Puff       aspirin 325 MG Tabs   Commonly known as:  ASA        atorvastatin 40 MG Tabs   What changed:  how much to take   Commonly known as:  LIPITOR    Take 1 Tab by mouth every bedtime.   Dose:  40 mg       etodolac 500 MG tablet   Commonly known as:  LODINE    Take 1 Tab by mouth 2 times a day.   Dose:  500 mg       GAS RELIEF EXTRA STRENGTH PO    Take  by mouth as needed.       ipratropium 0.02 % Soln   What changed:    - when to take this  - reasons to take this   Commonly known as:  ATROVENT    1 mL by Nebulization route 2 times a day.   Dose:  0.2 mg       ipratropium 17 MCG/ACT Aers   What changed:    - when to take this  - reasons to take this   Commonly known as:  ATROVENT    Inhale 2 " Puffs by mouth every 6 hours.   Dose:  2 Puff       OCUVITE PO    Take  by mouth every day. WITH ZINC       PROBIOTIC DAILY PO    Take  by mouth every day.       triamterene/hctz 37.5-25 MG Caps   What changed:  when to take this   Commonly known as:  MAXZIDE-25/DYAZIDE    Take 2 Caps by mouth 2 times a day.   Dose:  2 Cap       vitamin D3 5000 UNITS Caps    Take 1 Cap by mouth every day.   Dose:  1 Cap       * Notice:  This list has 2 medication(s) that are the same as other medications prescribed for you. Read the directions carefully, and ask your doctor or other care provider to review them with you.       I have personally performed a face to face diagnostic evaluation on this patient. I have reviewed the ACP note and agree with the history, exam and plan of care, except as noted.

## 2020-08-30 NOTE — ED ADULT NURSE REASSESSMENT NOTE - NS ED NURSE REASSESS COMMENT FT1
Pt c/o headache and pain to RLE 8/10, tearful, requesting pain med. MD [phone] called, will put in STAT meds then admitting MD will see pt. Pt admitted to medicine for RLE Hematoma, pending COVID result at this time then transfer to unit. Discussed plan of care with pt, pt in agreement. Will monitor effectiveness of pain med.

## 2020-08-30 NOTE — ED PROVIDER NOTE - PHYSICAL EXAMINATION
VITAL SIGNS: AFebrile, vital signs stable  CONSTITUTIONAL: Well-developed; well-nourished; in no acute distress.  SKIN: Skin exam is warm and dry, no acute rash.  HEAD: Normocephalic; atraumatic.  EYES: Pupils equal round reactive to light, Extraocular movements intact; conjunctiva and sclera clear.  ENT: No nasal discharge; airway clear. Moist mucus membranes.  NECK: Supple; non tender. No rigidity  CARD: Regular rate and rhythm. Normal S1, S2; no murmurs, gallops, or rubs.  RESP: Lungs clear to auscultation bilaterally. No wheezes, rales or rhonchi.  ABD: Abdomen soft; non-tender; non-distended;  no hepatosplenomegaly. No costovertebral angle tenderness.   EXT: Normal ROM. No clubbing, cyanosis or edema. no le edema, RLE calf ttp and ecchymosis, soft compartments, skin intact, 2+ dp pulse bilaterally  NEURO: Alert and oriented x 3. No focal deficits.  PSYCH: Cooperative, appropriate.

## 2020-08-30 NOTE — H&P ADULT - HISTORY OF PRESENT ILLNESS
Patient is a 80 year old female with Medical Hx of Essential HTN, CAD s/p stents, R knee replacement (3 years ago) who presents with Right posterior leg pain. Pain intermittent and sharp initially, started on Wednesday, pain was btw 7 and 8 in intensity, no radiation, did not notice any palliative element but was provoked by movement. Patient states on about 8/15/2020 she noticed her entire posterior lower leg was purple in color, looked like a bruise but patient denied any  trauma or   Anticoagulation therapy.  Patient said at the time there was no pain or swelling associated with the purple bruise, but on one week later she started to experience some pain so she went to her PMD who requested a LE doppler to r/o DVT, but test came back negative, but showed a possible ruptured cyst. However, this morning, the pain got unbearable to the   point that she couldn't walk so she decided to come to ED     In ED: vitals and Labs wnl  Trauma w/up negative   CT Right Knee: Approximately 2.5 x 2.5 x 4 cm intramuscular complex process posterior to the proximal tibia.  Differential includes hematoma, phlegmon or soft tissue neoplasm. Findings may represent a complex Baker's cyst  Moderate supra patellar joint effusion with rim enhancement, possibly postoperative in nature, however, infected fluid cannot be excluded    Pt states she takes Baby Asprin, Amlodipine and Ezetimibe, Doses Unknown  Pls complete Med rec in AM: Pharmacy : [facility]

## 2020-08-30 NOTE — ED PROVIDER NOTE - NS ED ROS FT
Review of Systems:  •	CONSTITUTIONAL - No fever, No diaphoresis, No weight change  •	SKIN - No rash  •	HEMATOLOGIC - No abnormal bleeding or bruising  •	EYES - No eye pain, No blurred vision  •	ENT - No change in hearing, No sore throat, No neck pain, No rhinorrhea, No ear pain  •	RESPIRATORY - No shortness of breath, No cough  •	CARDIAC -No chest pain, No palpitations  •	GI - No abdominal pain, No nausea, No vomiting, No diarrhea, No constipation, No bright red blood per rectum or melena. No flank pain  •                 - No dysuria, frequency, hematuria.   •	ENDO - No polydypsia, No polyuria, No heat/cold intolerance  •	MUSCULOSKELETAL - No joint paint, No swelling, No back pain. +leg pain.   •	NEUROLOGIC - No numbness, No focal weakness, No headache, No dizziness  All other systems negative, unless specified in HPI

## 2020-08-30 NOTE — H&P ADULT - NSHPREVIEWOFSYSTEMS_GEN_ALL_CORE
CONSTITUTIONAL: No weakness, fevers or chills  EYES/ENT: No visual changes;  No vertigo or throat pain   NECK: No pain or stiffness  RESPIRATORY: No cough, wheezing, hemoptysis; No shortness of breath  CARDIOVASCULAR: No chest pain or palpitations  GASTROINTESTINAL: No abdominal or epigastric pain. No nausea, vomiting, or hematemesis;  GENITOURINARY: No dysuria, frequency or hematuria  NEUROLOGICAL: No numbness or weakness  MUSCULOSKELETAL: Atraumatic, + Right leg pain, mild swelling

## 2020-08-30 NOTE — ED PROVIDER NOTE - PROGRESS NOTE DETAILS
Burn consulted seen by MADHU Chase who spoke to Dr. Clarke recommends admission for further eval. Recommends IR and Ortho consultation. Orthopedic consulted recommending further xrays. Dr. Putnam accepting admission.

## 2020-08-31 LAB
ANION GAP SERPL CALC-SCNC: 12 MMOL/L — SIGNIFICANT CHANGE UP (ref 7–14)
BASOPHILS # BLD AUTO: 0.02 K/UL — SIGNIFICANT CHANGE UP (ref 0–0.2)
BASOPHILS NFR BLD AUTO: 0.4 % — SIGNIFICANT CHANGE UP (ref 0–1)
BUN SERPL-MCNC: 18 MG/DL — SIGNIFICANT CHANGE UP (ref 10–20)
CALCIUM SERPL-MCNC: 9.6 MG/DL — SIGNIFICANT CHANGE UP (ref 8.5–10.1)
CHLORIDE SERPL-SCNC: 102 MMOL/L — SIGNIFICANT CHANGE UP (ref 98–110)
CO2 SERPL-SCNC: 27 MMOL/L — SIGNIFICANT CHANGE UP (ref 17–32)
CREAT SERPL-MCNC: 0.7 MG/DL — SIGNIFICANT CHANGE UP (ref 0.7–1.5)
EOSINOPHIL # BLD AUTO: 0.11 K/UL — SIGNIFICANT CHANGE UP (ref 0–0.7)
EOSINOPHIL NFR BLD AUTO: 2.3 % — SIGNIFICANT CHANGE UP (ref 0–8)
GLUCOSE SERPL-MCNC: 93 MG/DL — SIGNIFICANT CHANGE UP (ref 70–99)
HCT VFR BLD CALC: 38.4 % — SIGNIFICANT CHANGE UP (ref 37–47)
HGB BLD-MCNC: 12.8 G/DL — SIGNIFICANT CHANGE UP (ref 12–16)
IMM GRANULOCYTES NFR BLD AUTO: 0.2 % — SIGNIFICANT CHANGE UP (ref 0.1–0.3)
LYMPHOCYTES # BLD AUTO: 1.64 K/UL — SIGNIFICANT CHANGE UP (ref 1.2–3.4)
LYMPHOCYTES # BLD AUTO: 34.3 % — SIGNIFICANT CHANGE UP (ref 20.5–51.1)
MCHC RBC-ENTMCNC: 32.7 PG — HIGH (ref 27–31)
MCHC RBC-ENTMCNC: 33.3 G/DL — SIGNIFICANT CHANGE UP (ref 32–37)
MCV RBC AUTO: 98 FL — SIGNIFICANT CHANGE UP (ref 81–99)
MONOCYTES # BLD AUTO: 0.53 K/UL — SIGNIFICANT CHANGE UP (ref 0.1–0.6)
MONOCYTES NFR BLD AUTO: 11.1 % — HIGH (ref 1.7–9.3)
NEUTROPHILS # BLD AUTO: 2.47 K/UL — SIGNIFICANT CHANGE UP (ref 1.4–6.5)
NEUTROPHILS NFR BLD AUTO: 51.7 % — SIGNIFICANT CHANGE UP (ref 42.2–75.2)
NRBC # BLD: 0 /100 WBCS — SIGNIFICANT CHANGE UP (ref 0–0)
PLATELET # BLD AUTO: 248 K/UL — SIGNIFICANT CHANGE UP (ref 130–400)
POTASSIUM SERPL-MCNC: 4.7 MMOL/L — SIGNIFICANT CHANGE UP (ref 3.5–5)
POTASSIUM SERPL-SCNC: 4.7 MMOL/L — SIGNIFICANT CHANGE UP (ref 3.5–5)
RBC # BLD: 3.92 M/UL — LOW (ref 4.2–5.4)
RBC # FLD: 13.4 % — SIGNIFICANT CHANGE UP (ref 11.5–14.5)
SARS-COV-2 RNA SPEC QL NAA+PROBE: SIGNIFICANT CHANGE UP
SARS-COV-2 RNA SPEC QL NAA+PROBE: SIGNIFICANT CHANGE UP
SODIUM SERPL-SCNC: 141 MMOL/L — SIGNIFICANT CHANGE UP (ref 135–146)
WBC # BLD: 4.78 K/UL — LOW (ref 4.8–10.8)
WBC # FLD AUTO: 4.78 K/UL — LOW (ref 4.8–10.8)

## 2020-08-31 PROCEDURE — 73719 MRI LOWER EXTREMITY W/DYE: CPT | Mod: 26,RT

## 2020-08-31 RX ORDER — CHLORHEXIDINE GLUCONATE 213 G/1000ML
1 SOLUTION TOPICAL
Refills: 0 | Status: DISCONTINUED | OUTPATIENT
Start: 2020-08-31 | End: 2020-09-03

## 2020-08-31 RX ORDER — LISINOPRIL 2.5 MG/1
20 TABLET ORAL DAILY
Refills: 0 | Status: DISCONTINUED | OUTPATIENT
Start: 2020-08-31 | End: 2020-09-03

## 2020-08-31 RX ORDER — AMLODIPINE BESYLATE 2.5 MG/1
5 TABLET ORAL DAILY
Refills: 0 | Status: DISCONTINUED | OUTPATIENT
Start: 2020-08-31 | End: 2020-09-03

## 2020-08-31 RX ORDER — PANTOPRAZOLE SODIUM 20 MG/1
40 TABLET, DELAYED RELEASE ORAL
Refills: 0 | Status: DISCONTINUED | OUTPATIENT
Start: 2020-08-31 | End: 2020-09-03

## 2020-08-31 RX ORDER — ACETAMINOPHEN 500 MG
650 TABLET ORAL EVERY 6 HOURS
Refills: 0 | Status: DISCONTINUED | OUTPATIENT
Start: 2020-08-31 | End: 2020-09-03

## 2020-08-31 RX ORDER — OXYCODONE HYDROCHLORIDE 5 MG/1
5 TABLET ORAL EVERY 6 HOURS
Refills: 0 | Status: DISCONTINUED | OUTPATIENT
Start: 2020-08-31 | End: 2020-09-03

## 2020-08-31 RX ADMIN — Medication 650 MILLIGRAM(S): at 07:36

## 2020-08-31 RX ADMIN — ENOXAPARIN SODIUM 40 MILLIGRAM(S): 100 INJECTION SUBCUTANEOUS at 21:08

## 2020-08-31 RX ADMIN — LISINOPRIL 20 MILLIGRAM(S): 2.5 TABLET ORAL at 12:40

## 2020-08-31 RX ADMIN — OXYCODONE HYDROCHLORIDE 5 MILLIGRAM(S): 5 TABLET ORAL at 10:58

## 2020-08-31 RX ADMIN — PANTOPRAZOLE SODIUM 40 MILLIGRAM(S): 20 TABLET, DELAYED RELEASE ORAL at 12:40

## 2020-08-31 RX ADMIN — AMLODIPINE BESYLATE 5 MILLIGRAM(S): 2.5 TABLET ORAL at 12:39

## 2020-08-31 RX ADMIN — OXYCODONE HYDROCHLORIDE 5 MILLIGRAM(S): 5 TABLET ORAL at 10:20

## 2020-08-31 RX ADMIN — Medication 650 MILLIGRAM(S): at 07:05

## 2020-08-31 RX ADMIN — Medication 81 MILLIGRAM(S): at 12:39

## 2020-08-31 RX ADMIN — ENOXAPARIN SODIUM 40 MILLIGRAM(S): 100 INJECTION SUBCUTANEOUS at 00:34

## 2020-08-31 NOTE — PATIENT PROFILE ADULT - DO YOU NEED ADDITIONAL SERVICES TO MANAGE ANY OF THESE MEDICAL CONDITIONS AT HOME?
Pt states that she has a fever 100.2, chills/burning up, nasal congestion, post nasal drip, cough, head pain, jaw pain, face pain.  Pt's secretions are brown in color.  Pt requesting an antibiotic be called to [facility].   no

## 2020-08-31 NOTE — PROGRESS NOTE ADULT - SUBJECTIVE AND OBJECTIVE BOX
Patient is a 80 year old female with pmh of htn and CAD (s/p stents 2007-on ASA only) and R knee replacement (3 years ago) who presents with Right posterior leg pain. Patient states on about 8/15/2020 she noticed her entire posterior lower leg was  a purple bruise. Patient denies any trauma/falls that would cause such a large bruise. Patient said at the time there was no pain or swelling associated with the purple bruise.  She called her PMD who sent her for an US to rule out a DVT on Wed 8/26/2020. She states US was negative for a DVT. About a day later that's when she the pain on the upper calf started. Patient states the pain has gotten gradually worse, to the point where it is difficult to walk. Patient denies any fever, SOB, Chest pain, n/v/d, numbness or tingling.     In ED patient had BLE Duplex and CT RLE. Results below.     ------    Patient admitted overnight to medicine service. Patient is awaiting to have MRI done and to be seen by orthopedics. Patient still reporting pain to right leg with minimal improvement.     Allergies    No Known Allergies    Intolerances      PAST MEDICAL & SURGICAL HISTORY:  Anxiety  GERD (gastroesophageal reflux disease)  CAD (coronary artery disease)  Hypertension  S/P cholecystectomy  H/O total knee replacement, right  H/O abdominal hysterectomy    < from: CT Lower Extremity w/ IV Cont, Right (08.30.20 @ 13:00) >  IMPRESSION:    Approximately 2.5 x 2.5 x 4 cm intramuscular complex process posterior to the proximal tibia.    Differential includes hematoma, phlegmon or soft tissue neoplasm. Findings may represent a complex Baker's cyst    Further evaluation with a postcontrast MRI is recommended for further characterization    Moderate supra patellar joint effusion with rim enhancement, possibly postoperative in nature, however, infected fluid cannot be excluded    Multiple areas of narrowing within the anterior tibial artery, incompletely evaluated on this examination. If indicated, a CT angiogram of the right lower extremity can be performed on an outpatient basis.    < end of copied text >    < from: [facility] Duplex Lower Ext Vein Scan, Bilat (08.30.20 @ 09:05) >  No evidence of deep venous thrombosis or superficial thrombophlebitis in the bilateral lower extremities.  Right knee nonspecific fluid collection 1.6 x 1.9 x 2.2 cm  Left popliteal fossa Baker's cyst 1.1 x 1.8 x 3.1 cm    < end of copied text >                                                  12.8   4.78  )-----------( 248      ( 31 Aug 2020 07:57 )             38.4         Exam:  General: Patient sitting comfortably in bed, in no acute distress  Neuro: awake, alert and oriented, speaking in full sentences  MSK:   Right lower leg: mildly swollen, dark purple ecchymosis along entire calf, no open wounds, no bleeding, no pus, no erythema, normal ROM, sensation intact, 2+ DP pulse, very tender to palpation upper calf area right below popliteal fossa more towards medial aspect of leg, no induration or collection palpated, no crepitus   Left lower leg: normal ROM, 2+ DP Pulse, no tenderness, no erythema, no swelling, no bruising no wounds

## 2020-08-31 NOTE — PROGRESS NOTE ADULT - SUBJECTIVE AND OBJECTIVE BOX
Hospital Day:  1d    Patient is a 80y old  Female who presents with a chief complaint of 1d    Subjective:  No overnight events. Seen and examined at bedside this morning. Reported that the RLE pain was slightly improved but still limiting her mobility. Reported no other acute complaints.     Past Medical Hx:   Anxiety  GERD (gastroesophageal reflux disease)  CAD (coronary artery disease)  Hypertension    Past Sx:  S/P cholecystectomy  H/O total knee replacement, right  H/O abdominal hysterectomy    Allergies:  No Known Allergies    Current Meds:   Standng Meds:  amLODIPine   Tablet 5 milliGRAM(s) Oral daily  aspirin  chewable 81 milliGRAM(s) Oral daily  chlorhexidine 4% Liquid 1 Application(s) Topical <User Schedule>  enoxaparin Injectable 40 milliGRAM(s) SubCutaneous at bedtime  lisinopril 20 milliGRAM(s) Oral daily  pantoprazole    Tablet 40 milliGRAM(s) Oral before breakfast    PRN Meds:  acetaminophen   Tablet .. 650 milliGRAM(s) Oral every 6 hours PRN Mild Pain (1 - 3)    HOME MEDICATIONS:  ALPRAZolam 0.5 mg oral tablet:   amlodipine-benazepril 5 mg-20 mg oral capsule: 1 cap(s) orally once a day  Carafate: 1 gram(s) orally 1 to 4 times a day  Metoprolol Succinate ER 25 mg oral tablet, extended release: 1 tab(s) orally once a day  Plavix 75 mg oral tablet: 1 tab(s) orally once a day  pravastatin 10 mg oral tablet: 1 tab(s) orally once a day (at bedtime)  raNITIdine 150 mg oral capsule: 1 cap(s) orally 2 times a day      Vital Signs:   T(F): 96.3 (08-31-20 @ 04:34), Max: 97 (08-30-20 @ 19:25)  HR: 79 (08-31-20 @ 04:34) (69 - 79)  BP: 142/72 (08-31-20 @ 04:34) (140/63 - 147/67)  RR: 18 (08-31-20 @ 04:34) (18 - 20)  SpO2: 98% (08-31-20 @ 04:34) (96% - 99%)    Physical Exam:   GENERAL: NAD, Pleasant and conversive, elderly and appearing stated age   HEENT: NCAT, PERRLA, EOMI  CHEST/LUNG: Clear to auscultation bilaterally, no wheezing, rhonchi, rales  HEART: Regular rate and rhythm; s1 s2 appreciated, No murmurs, rubs, or gallops  ABDOMEN: Soft, Nontender, Nondistended; Bowel sounds present  EXTREMITIES: RLE ecchymosis with pinpoint tenderness   SKIN: no rashes, no new lesions  NERVOUS SYSTEM:  Alert & Oriented X3, Cranial Nerves ii-xii grossly intact, Sensation equal and intact bilaterally  LINES/CATHETERS: Peripheral IV    Labs:                         12.8   4.78  )-----------( 248      ( 31 Aug 2020 07:57 )             38.4     Neutophil% 51.7, Lymphocyte% 34.3, Monocyte% 11.1, Bands% 0.2 08-31-20 @ 07:57    30 Aug 2020 09:50    142    |  105    |  17     ----------------------------<  101    4.3     |  26     |  0.7      Ca    9.5        30 Aug 2020 09:50    TPro  6.7    /  Alb  4.7    /  TBili  0.7    /  DBili  x      /  AST  21     /  ALT  17     /  AlkPhos  78     30 Aug 2020 09:50    Radiology:   CT Lower Extremity w/ IV Cont, Right (08.30.20 @ 13:00)  IMPRESSION:    Approximately 2.5 x 2.5 x 4 cm intramuscular complex process posterior to the proximal tibia.    Differential includes hematoma, phlegmon or soft tissue neoplasm. Findings may represent a complex Baker's cyst    Further evaluation with a postcontrast MRI is recommended for further characterization    Moderate supra patellar joint effusion with rim enhancement, possibly postoperative in nature, however, infected fluid cannot be excluded    Multiple areas of narrowing within the anterior tibial artery, incompletely evaluated on this examination. If indicated, a CT angiogram of the right lower extremity can be performed on an outpatient basis.    Assessment and Plan:   80 year old female with PMHx of HTN, CAD s/p stenting, Right Knee replacement 3 years ago who presents with right leg posterior calf pain and ecchymosis.     #RLE posterior pain and ecchymosis  - Likely secondary to a ruptured baker's cyst vs old hematoma  - Not on any anticoagulation at this time  - Noted gradual and progressive nature of pain   - CT results noted; 2.5x2.5x4cm intramuscular complex process   - Reports pain improved today   - Tylenol for Pain Control  - Burn team following; will await MRI for further recs  - Ortho consulted  - MRI ordered; patient is deemed MRI compatible. Confirmed Knee Replacement is MRI safe with Orthopedic Surgeon Dr. Londono [phone].     #HTN  - Takes Amlodipine/Benzapril at home; continue on Amlodipine and Lisinopril while inpatient     #HLD  - Takes zetia 10mg at home; will hold for now as med is non formulary  - Patient advised to have her medication brought to the hospital to complete non-formulary process    #GERD  - Protonix    #H/o CAD s/p PCI  - Continue on ASA     Activity: As tolerated  Diet: DASH  DVT ppx: Lovenox  GI ppx: Protonix  Code Status: Full Code  DISPO: From Home; pending MRI Hospital Day:  1d    Patient is a 80y old  Female who presents with a chief complaint of 1d    Subjective:  No overnight events. Seen and examined at bedside this morning. Reported that the RLE pain was slightly improved but still limiting her mobility. Reported no other acute complaints.     Past Medical Hx:   Anxiety  GERD (gastroesophageal reflux disease)  CAD (coronary artery disease)  Hypertension    Past Sx:  S/P cholecystectomy  H/O total knee replacement, right  H/O abdominal hysterectomy    Allergies:  No Known Allergies    Current Meds:   Standng Meds:  amLODIPine   Tablet 5 milliGRAM(s) Oral daily  aspirin  chewable 81 milliGRAM(s) Oral daily  chlorhexidine 4% Liquid 1 Application(s) Topical <User Schedule>  enoxaparin Injectable 40 milliGRAM(s) SubCutaneous at bedtime  lisinopril 20 milliGRAM(s) Oral daily  pantoprazole    Tablet 40 milliGRAM(s) Oral before breakfast    PRN Meds:  acetaminophen   Tablet .. 650 milliGRAM(s) Oral every 6 hours PRN Mild Pain (1 - 3)    HOME MEDICATIONS:  ALPRAZolam 0.5 mg oral tablet:   amlodipine-benazepril 5 mg-20 mg oral capsule: 1 cap(s) orally once a day  Carafate: 1 gram(s) orally 1 to 4 times a day  Metoprolol Succinate ER 25 mg oral tablet, extended release: 1 tab(s) orally once a day  Plavix 75 mg oral tablet: 1 tab(s) orally once a day  pravastatin 10 mg oral tablet: 1 tab(s) orally once a day (at bedtime)  raNITIdine 150 mg oral capsule: 1 cap(s) orally 2 times a day      Vital Signs:   T(F): 96.3 (08-31-20 @ 04:34), Max: 97 (08-30-20 @ 19:25)  HR: 79 (08-31-20 @ 04:34) (69 - 79)  BP: 142/72 (08-31-20 @ 04:34) (140/63 - 147/67)  RR: 18 (08-31-20 @ 04:34) (18 - 20)  SpO2: 98% (08-31-20 @ 04:34) (96% - 99%)    Physical Exam:   GENERAL: NAD, Pleasant and conversive, elderly and appearing stated age   HEENT: NCAT, PERRLA, EOMI  CHEST/LUNG: Clear to auscultation bilaterally, no wheezing, rhonchi, rales  HEART: Regular rate and rhythm; s1 s2 appreciated, No murmurs, rubs, or gallops  ABDOMEN: Soft, Nontender, Nondistended; Bowel sounds present  EXTREMITIES: RLE ecchymosis with pinpoint tenderness   SKIN: no rashes, no new lesions  NERVOUS SYSTEM:  Alert & Oriented X3, Cranial Nerves ii-xii grossly intact, Sensation equal and intact bilaterally  LINES/CATHETERS: Peripheral IV    Labs:                         12.8   4.78  )-----------( 248      ( 31 Aug 2020 07:57 )             38.4     Neutophil% 51.7, Lymphocyte% 34.3, Monocyte% 11.1, Bands% 0.2 08-31-20 @ 07:57    30 Aug 2020 09:50    142    |  105    |  17     ----------------------------<  101    4.3     |  26     |  0.7      Ca    9.5        30 Aug 2020 09:50    TPro  6.7    /  Alb  4.7    /  TBili  0.7    /  DBili  x      /  AST  21     /  ALT  17     /  AlkPhos  78     30 Aug 2020 09:50    Radiology:   CT Lower Extremity w/ IV Cont, Right (08.30.20 @ 13:00)  IMPRESSION:    Approximately 2.5 x 2.5 x 4 cm intramuscular complex process posterior to the proximal tibia.    Differential includes hematoma, phlegmon or soft tissue neoplasm. Findings may represent a complex Baker's cyst    Further evaluation with a postcontrast MRI is recommended for further characterization    Moderate supra patellar joint effusion with rim enhancement, possibly postoperative in nature, however, infected fluid cannot be excluded    Multiple areas of narrowing within the anterior tibial artery, incompletely evaluated on this examination. If indicated, a CT angiogram of the right lower extremity can be performed on an outpatient basis.    Assessment and Plan:   80 year old female with PMHx of HTN, CAD s/p stenting, Right Knee replacement 3 years ago who presents with right leg posterior calf pain and ecchymosis.     #RLE posterior pain and ecchymosis  - Likely secondary to a ruptured baker's cyst vs old hematoma  - Not on any anticoagulation at this time  - Noted gradual and progressive nature of pain   - CT results noted; 2.5x2.5x4cm intramuscular complex process   - Reports pain improved today   - Tylenol for Pain Control  - Burn team following; will await MRI for further recs  - Ortho consulted  - MRI ordered; Confirmed Knee Replacement is MRI safe with Orthopedic Surgeon Dr. Londono [phone]. Spoke with MRI department here; aware of her knee replacement. Cardiac stents will be okay as well     #HTN  - Takes Amlodipine/Benzapril at home; continue on Amlodipine and Lisinopril while inpatient     #HLD  - Takes zetia 10mg at home; will hold for now as med is non formulary  - Patient advised to have her medication brought to the hospital to complete non-formulary process    #GERD  - Protonix    #H/o CAD s/p PCI  - Continue on ASA     Activity: As tolerated  Diet: DASH  DVT ppx: Lovenox  GI ppx: Protonix  Code Status: Full Code  DISPO: From Home; pending MRI

## 2020-09-01 LAB
ANION GAP SERPL CALC-SCNC: 9 MMOL/L — SIGNIFICANT CHANGE UP (ref 7–14)
APTT BLD: 30.4 SEC — SIGNIFICANT CHANGE UP (ref 27–39.2)
BASOPHILS # BLD AUTO: 0.02 K/UL — SIGNIFICANT CHANGE UP (ref 0–0.2)
BASOPHILS NFR BLD AUTO: 0.4 % — SIGNIFICANT CHANGE UP (ref 0–1)
BUN SERPL-MCNC: 18 MG/DL — SIGNIFICANT CHANGE UP (ref 10–20)
CALCIUM SERPL-MCNC: 9.6 MG/DL — SIGNIFICANT CHANGE UP (ref 8.5–10.1)
CHLORIDE SERPL-SCNC: 105 MMOL/L — SIGNIFICANT CHANGE UP (ref 98–110)
CO2 SERPL-SCNC: 26 MMOL/L — SIGNIFICANT CHANGE UP (ref 17–32)
CREAT SERPL-MCNC: 0.8 MG/DL — SIGNIFICANT CHANGE UP (ref 0.7–1.5)
EOSINOPHIL # BLD AUTO: 0.13 K/UL — SIGNIFICANT CHANGE UP (ref 0–0.7)
EOSINOPHIL NFR BLD AUTO: 2.6 % — SIGNIFICANT CHANGE UP (ref 0–8)
GLUCOSE SERPL-MCNC: 94 MG/DL — SIGNIFICANT CHANGE UP (ref 70–99)
HCT VFR BLD CALC: 37.5 % — SIGNIFICANT CHANGE UP (ref 37–47)
HGB BLD-MCNC: 12.4 G/DL — SIGNIFICANT CHANGE UP (ref 12–16)
IMM GRANULOCYTES NFR BLD AUTO: 0.2 % — SIGNIFICANT CHANGE UP (ref 0.1–0.3)
INR BLD: 1.08 RATIO — SIGNIFICANT CHANGE UP (ref 0.65–1.3)
LYMPHOCYTES # BLD AUTO: 1.91 K/UL — SIGNIFICANT CHANGE UP (ref 1.2–3.4)
LYMPHOCYTES # BLD AUTO: 37.6 % — SIGNIFICANT CHANGE UP (ref 20.5–51.1)
MAGNESIUM SERPL-MCNC: 2.2 MG/DL — SIGNIFICANT CHANGE UP (ref 1.8–2.4)
MCHC RBC-ENTMCNC: 32.5 PG — HIGH (ref 27–31)
MCHC RBC-ENTMCNC: 33.1 G/DL — SIGNIFICANT CHANGE UP (ref 32–37)
MCV RBC AUTO: 98.4 FL — SIGNIFICANT CHANGE UP (ref 81–99)
MONOCYTES # BLD AUTO: 0.52 K/UL — SIGNIFICANT CHANGE UP (ref 0.1–0.6)
MONOCYTES NFR BLD AUTO: 10.2 % — HIGH (ref 1.7–9.3)
NEUTROPHILS # BLD AUTO: 2.49 K/UL — SIGNIFICANT CHANGE UP (ref 1.4–6.5)
NEUTROPHILS NFR BLD AUTO: 49 % — SIGNIFICANT CHANGE UP (ref 42.2–75.2)
NRBC # BLD: 0 /100 WBCS — SIGNIFICANT CHANGE UP (ref 0–0)
PLATELET # BLD AUTO: 265 K/UL — SIGNIFICANT CHANGE UP (ref 130–400)
POTASSIUM SERPL-MCNC: 4.7 MMOL/L — SIGNIFICANT CHANGE UP (ref 3.5–5)
POTASSIUM SERPL-SCNC: 4.7 MMOL/L — SIGNIFICANT CHANGE UP (ref 3.5–5)
PROTHROM AB SERPL-ACNC: 12.4 SEC — SIGNIFICANT CHANGE UP (ref 9.95–12.87)
RBC # BLD: 3.81 M/UL — LOW (ref 4.2–5.4)
RBC # FLD: 13.5 % — SIGNIFICANT CHANGE UP (ref 11.5–14.5)
SARS-COV-2 IGG SERPL QL IA: NEGATIVE — SIGNIFICANT CHANGE UP
SARS-COV-2 IGM SERPL IA-ACNC: 0.02 INDEX — SIGNIFICANT CHANGE UP
SODIUM SERPL-SCNC: 140 MMOL/L — SIGNIFICANT CHANGE UP (ref 135–146)
WBC # BLD: 5.08 K/UL — SIGNIFICANT CHANGE UP (ref 4.8–10.8)
WBC # FLD AUTO: 5.08 K/UL — SIGNIFICANT CHANGE UP (ref 4.8–10.8)

## 2020-09-01 PROCEDURE — 93010 ELECTROCARDIOGRAM REPORT: CPT

## 2020-09-01 RX ADMIN — AMLODIPINE BESYLATE 5 MILLIGRAM(S): 2.5 TABLET ORAL at 05:23

## 2020-09-01 RX ADMIN — LISINOPRIL 20 MILLIGRAM(S): 2.5 TABLET ORAL at 05:23

## 2020-09-01 RX ADMIN — Medication 81 MILLIGRAM(S): at 12:19

## 2020-09-01 RX ADMIN — Medication 650 MILLIGRAM(S): at 12:55

## 2020-09-01 RX ADMIN — PANTOPRAZOLE SODIUM 40 MILLIGRAM(S): 20 TABLET, DELAYED RELEASE ORAL at 05:23

## 2020-09-01 RX ADMIN — CHLORHEXIDINE GLUCONATE 1 APPLICATION(S): 213 SOLUTION TOPICAL at 05:23

## 2020-09-01 RX ADMIN — Medication 650 MILLIGRAM(S): at 12:22

## 2020-09-01 NOTE — PROGRESS NOTE ADULT - SUBJECTIVE AND OBJECTIVE BOX
LENGTH OF HOSPITAL STAY: 2d      CHIEF COMPLAINT:   Patient is a 80y old  Female who presents with a chief complaint of Right Leg Pain (01 Sep 2020 10:55)      OVER Past 24hrs:  The patient was seen and examined at bedside there were    HISTORY OF PRESENTING ILLNESS:    HPI:  Patient is a 80 year old female with Medical Hx of Essential HTN, CAD s/p stents, R knee replacement (3 years ago) who presents with Right posterior leg pain. Pain intermittent and sharp initially, started on Wednesday, pain was btw 7 and 8 in intensity, no radiation, did not notice any palliative element but was provoked by movement. Patient states on about 8/15/2020 she noticed her entire posterior lower leg was purple in color, looked like a bruise but patient denied any  trauma or   Anticoagulation therapy.  Patient said at the time there was no pain or swelling associated with the purple bruise, but on one week later she started to experience some pain so she went to her PMD who requested a LE doppler to r/o DVT, but test came back negative, but showed a possible ruptured cyst. However, this morning, the pain got unbearable to the   point that she couldn't walk so she decided to come to ED     In ED: vitals and Labs wnl  Trauma w/up negative   CT Right Knee: Approximately 2.5 x 2.5 x 4 cm intramuscular complex process posterior to the proximal tibia.  Differential includes hematoma, phlegmon or soft tissue neoplasm. Findings may represent a complex Baker's cyst  Moderate supra patellar joint effusion with rim enhancement, possibly postoperative in nature, however, infected fluid cannot be excluded    Pt states she takes Baby Asprin, Amlodipine and Ezetimibe, Doses Unknown  Pls complete Med rec in AM: [facility] : [facility] [email or web address] (30 Aug 2020 22:05)    PAST MEDICAL & SURGICAL HISTORY  PAST MEDICAL & SURGICAL HISTORY:  Anxiety  GERD (gastroesophageal reflux disease)  CAD (coronary artery disease)  Hypertension  S/P cholecystectomy  H/O total knee replacement, right  H/O abdominal hysterectomy        REVIEW OF SYSTEMS  CONSTITUTIONAL: No fever, weight loss, or fatigue  EYES: No eye pain, visual disturbances, or discharge  ENMT:  No difficulty hearing, tinnitus, vertigo; No sinus or throat pain  NECK: No pain or stiffness  RESPIRATORY: No cough, wheezing, chills or hemoptysis; No shortness of breath  CARDIOVASCULAR: No chest pain, palpitations, dizziness, or leg swelling  GASTROINTESTINAL: No abdominal or epigastric pain. No nausea, vomiting, or hematemesis; No diarrhea or constipation. No melena or hematochezia.  GENITOURINARY: No dysuria, frequency, hematuria, or incontinence  NEUROLOGICAL: No headaches, memory loss, loss of strength, numbness, or tremors  SKIN: No itching, burning, rashes, or lesions   LYMPH NODES: No enlarged glands  ENDOCRINE: No heat or cold intolerance; No hair loss  MUSCULOSKELETAL: No joint pain or swelling; No muscle, back, or extremity pain  PSYCHIATRIC: No depression, anxiety, mood swings, or difficulty sleeping  HEME/LYMPH: No easy bruising, or bleeding gums  ALLERY AND IMMUNOLOGIC: No hives or eczema    ALLERGIES:  No Known Allergies    MEDICATIONS:  STANDING MEDICATIONS  amLODIPine   Tablet 5 milliGRAM(s) Oral daily  aspirin  chewable 81 milliGRAM(s) Oral daily  chlorhexidine 4% Liquid 1 Application(s) Topical <User Schedule>  enoxaparin Injectable 40 milliGRAM(s) SubCutaneous at bedtime  lisinopril 20 milliGRAM(s) Oral daily  pantoprazole    Tablet 40 milliGRAM(s) Oral before breakfast    PRN MEDICATIONS  acetaminophen   Tablet .. 650 milliGRAM(s) Oral every 6 hours PRN  oxyCODONE    IR 5 milliGRAM(s) Oral every 6 hours PRN    VITALS:   T(F): 96  HR: 65  BP: 101/61  RR: 18  SpO2: 97%    PHYSICAL EXAM:  General: No acute distress.  Alert, oriented, interactive, nonfocal.    HEENT: Pupils equal, reactive to light symmetrically.    PULM: Clear to auscultation bilaterally, no significant sputum production.    CVS: Regular rate and rhythm, no murmurs, rubs, or gallops.    Abdomen: Soft, nondistended, nontender.    Pelvis:    Extremities: No edema, nontender.    SKIN: Warm and well perfused, no rashes noted.    PSYCH:     NEURO:    LABS:                        12.4   5.08  )-----------( 265      ( 01 Sep 2020 06:07 )             37.5     09-01    140  |  105  |  18  ----------------------------<  94  4.7   |  26  |  0.8    Ca    9.6      01 Sep 2020 06:07  Mg     2.2     09-01                    RADIOLOGY:  < from: MR Lower Ext Non-joint w/ IV Cont, Right (08.31.20 @ 20:15) >  Impression:    Multiseptated complex fluid collection measuring 3.5 x 4.1 x 6.8 cm in the medial gastrocnemius muscle contiguous with the popliteal cyst, demonstrating fluid fluid level, slightly hyperintense on T1 and containing debris/loose bodies with thickened wall enhancement. Differential includes infected popliteal cyst/abscess versus hematoma. Recommend aspiration/tissue sampling.    Moderate knee joint effusion.    Right total knee arthroplasty, without definite MRI evidence of osteolysis.    < end of copied text >          Assessment and Plan:   80 year old female with PMHx of HTN, CAD s/p stenting, Right Knee replacement 3 years ago who presents with right leg posterior calf pain and ecchymosis.   ***Knee Hardware and Cardiac stent safe for MRI.    # RLE posterior pain and ecchymosis - DDx: Infected popliteal cyst/Abscess vs. Hematoma  - Noted gradual and progressive nature of pain; Not on any anticoagulation at this time  - CT results noted; 2.5x2.5x4cm intramuscular complex process   - IR consulted for drainage.   - f/u Ortho c/s  - MRI ordered; Confirmed Knee Replacement is MRI safe with Orthopedic Surgeon Dr. Londono [phone]. Spoke with MRI department here; aware of her knee replacement. Cardiac stents will be okay as well     # HTN  - Takes Amlodipine/Benzapril at home; continue on Amlodipine and Lisinopril while inpatient     # HLD  - Takes zetia 10mg at home; will hold for now as med is non formulary  - Patient advised to have her medication brought to the hospital to complete non-formulary process    # GERD  - Protonix    # H/o CAD s/p PCI  - Continue on ASA     Activity: As tolerated  Diet: DASH  DVT ppx: Lovenox  GI ppx: Protonix  Code Status: Full Code  DISPO: From Home;   Provider Handoff: Pending fluid aspiratino LENGTH OF HOSPITAL STAY: 2d      CHIEF COMPLAINT:   Patient is a 80y old  Female who presents with a chief complaint of Right Leg Pain (01 Sep 2020 10:55)      OVER Past 24hrs:  The patient was seen and examined at bedside there were no acute events overnight.     HISTORY OF PRESENTING ILLNESS:    HPI:  Patient is a 80 year old female with Medical Hx of Essential HTN, CAD s/p stents, R knee replacement (3 years ago) who presents with Right posterior leg pain. Pain intermittent and sharp initially, started on Wednesday, pain was btw 7 and 8 in intensity, no radiation, did not notice any palliative element but was provoked by movement. Patient states on about 8/15/2020 she noticed her entire posterior lower leg was purple in color, looked like a bruise but patient denied any  trauma or   Anticoagulation therapy.  Patient said at the time there was no pain or swelling associated with the purple bruise, but on one week later she started to experience some pain so she went to her PMD who requested a LE doppler to r/o DVT, but test came back negative, but showed a possible ruptured cyst. However, this morning, the pain got unbearable to the   point that she couldn't walk so she decided to come to ED     In ED: vitals and Labs wnl  Trauma w/up negative   CT Right Knee: Approximately 2.5 x 2.5 x 4 cm intramuscular complex process posterior to the proximal tibia.  Differential includes hematoma, phlegmon or soft tissue neoplasm. Findings may represent a complex Baker's cyst  Moderate supra patellar joint effusion with rim enhancement, possibly postoperative in nature, however, infected fluid cannot be excluded    Pt states she takes Baby Asprin, Amlodipine and Ezetimibe, Doses Unknown  Pls complete Med rec in AM: [facility] [address] (30 Aug 2020 22:05)    PAST MEDICAL & SURGICAL HISTORY  PAST MEDICAL & SURGICAL HISTORY:  Anxiety  GERD (gastroesophageal reflux disease)  CAD (coronary artery disease)  Hypertension  S/P cholecystectomy  H/O total knee replacement, right  H/O abdominal hysterectomy      ALLERY AND IMMUNOLOGIC: No hives or eczema    ALLERGIES:  No Known Allergies    MEDICATIONS:  STANDING MEDICATIONS  amLODIPine   Tablet 5 milliGRAM(s) Oral daily  aspirin  chewable 81 milliGRAM(s) Oral daily  chlorhexidine 4% Liquid 1 Application(s) Topical <User Schedule>  enoxaparin Injectable 40 milliGRAM(s) SubCutaneous at bedtime  lisinopril 20 milliGRAM(s) Oral daily  pantoprazole    Tablet 40 milliGRAM(s) Oral before breakfast    PRN MEDICATIONS  acetaminophen   Tablet .. 650 milliGRAM(s) Oral every 6 hours PRN  oxyCODONE    IR 5 milliGRAM(s) Oral every 6 hours PRN    VITALS:   T(F): 96  HR: 65  BP: 101/61  RR: 18  SpO2: 97%    PHYSICAL EXAM:  General: No acute distress.  Alert, oriented, interactive, nonfocal.    HEENT: Pupils equal, reactive to light symmetrically.    PULM: Clear to auscultation bilaterally, no significant sputum production.    CVS: Regular rate and rhythm, no murmurs, rubs, or gallops.    Abdomen: Soft, nondistended, nontender.    Extremities: Purplish skin discoloration over the posterior aspect of the RIGHT knee.     SKIN: Warm and well perfused, no rashes noted; except as above.     LABS:                        12.4   5.08  )-----------( 265      ( 01 Sep 2020 06:07 )             37.5     09-01    140  |  105  |  18  ----------------------------<  94  4.7   |  26  |  0.8    Ca    9.6      01 Sep 2020 06:07  Mg     2.2     09-01        RADIOLOGY:  < from: MR Lower Ext Non-joint w/ IV Cont, Right (08.31.20 @ 20:15) >  Impression:    Multiseptated complex fluid collection measuring 3.5 x 4.1 x 6.8 cm in the medial gastrocnemius muscle contiguous with the popliteal cyst, demonstrating fluid fluid level, slightly hyperintense on T1 and containing debris/loose bodies with thickened wall enhancement. Differential includes infected popliteal cyst/abscess versus hematoma. Recommend aspiration/tissue sampling.    Moderate knee joint effusion.    Right total knee arthroplasty, without definite MRI evidence of osteolysis.    < end of copied text >        Assessment and Plan:   80 year old female with PMHx of HTN, CAD s/p stenting, Right Knee replacement 3 years ago who presents with right leg posterior calf pain and ecchymosis.   ***Knee Hardware and Cardiac Stent safe for MRI***    # RLE posterior pain and ecchymosis - DDx: Infected popliteal cyst/Abscess vs. Hematoma  - Noted gradual and progressive nature of pain; Not on any anticoagulation at this time  - CT results noted; 2.5x2.5x4cm intramuscular complex process   - IR consulted for drainage.   - f/u Ortho c/s    # HTN  - Takes Amlodipine/Benzapril at home; continue on Amlodipine and Lisinopril while inpatient     # HLD  - Takes zetia 10mg at home; will hold for now as med is non formulary  - Patient advised to have her medication brought to the hospital to complete non-formulary process    # GERD  - Protonix    # H/o CAD s/p PCI  - Continue on ASA     Activity: As tolerated  Diet: DASH  DVT ppx: Lovenox  GI ppx: Protonix  Code Status: Full Code  DISPO: From Home;   Provider Handoff: Pending fluid aspiration. LENGTH OF HOSPITAL STAY: 2d      CHIEF COMPLAINT:   Patient is a 80y old  Female who presents with a chief complaint of Right Leg Pain (01 Sep 2020 10:55)      OVER Past 24hrs:  The patient was seen and examined at bedside there were no acute events overnight.     HISTORY OF PRESENTING ILLNESS:    HPI:  Patient is a 80 year old female with Medical Hx of Essential HTN, CAD s/p stents, R knee replacement (3 years ago) who presents with Right posterior leg pain. Pain intermittent and sharp initially, started on Wednesday, pain was btw 7 and 8 in intensity, no radiation, did not notice any palliative element but was provoked by movement. Patient states on about 8/15/2020 she noticed her entire posterior lower leg was purple in color, looked like a bruise but patient denied any  trauma or   Anticoagulation therapy.  Patient said at the time there was no pain or swelling associated with the purple bruise, but on one week later she started to experience some pain so she went to her PMD who requested a LE doppler to r/o DVT, but test came back negative, but showed a possible ruptured cyst. However, this morning, the pain got unbearable to the   point that she couldn't walk so she decided to come to ED     In ED: vitals and Labs wnl  Trauma w/up negative   CT Right Knee: Approximately 2.5 x 2.5 x 4 cm intramuscular complex process posterior to the proximal tibia.  Differential includes hematoma, phlegmon or soft tissue neoplasm. Findings may represent a complex Baker's cyst  Moderate supra patellar joint effusion with rim enhancement, possibly postoperative in nature, however, infected fluid cannot be excluded    Pt states she takes Baby Asprin, Amlodipine and Ezetimibe, Doses Unknown  Pls complete Med rec in AM: [facility] : [facility] [address] (30 Aug 2020 22:05)    PAST MEDICAL & SURGICAL HISTORY  PAST MEDICAL & SURGICAL HISTORY:  Anxiety  GERD (gastroesophageal reflux disease)  CAD (coronary artery disease)  Hypertension  S/P cholecystectomy  H/O total knee replacement, right  H/O abdominal hysterectomy      ALLERY AND IMMUNOLOGIC: No hives or eczema    ALLERGIES:  No Known Allergies    MEDICATIONS:  STANDING MEDICATIONS  amLODIPine   Tablet 5 milliGRAM(s) Oral daily  aspirin  chewable 81 milliGRAM(s) Oral daily  chlorhexidine 4% Liquid 1 Application(s) Topical <User Schedule>  enoxaparin Injectable 40 milliGRAM(s) SubCutaneous at bedtime  lisinopril 20 milliGRAM(s) Oral daily  pantoprazole    Tablet 40 milliGRAM(s) Oral before breakfast    PRN MEDICATIONS  acetaminophen   Tablet .. 650 milliGRAM(s) Oral every 6 hours PRN  oxyCODONE    IR 5 milliGRAM(s) Oral every 6 hours PRN    VITALS:   T(F): 96  HR: 65  BP: 101/61  RR: 18  SpO2: 97%    PHYSICAL EXAM:  General: No acute distress.  Alert, oriented, interactive, nonfocal.    HEENT: Pupils equal, reactive to light symmetrically.    PULM: Clear to auscultation bilaterally, no significant sputum production.    CVS: Regular rate and rhythm, no murmurs, rubs, or gallops.    Abdomen: Soft, nondistended, nontender.    Extremities: Purplish skin discoloration over the posterior aspect of the RIGHT knee.     SKIN: Warm and well perfused, no rashes noted; except as above.     LABS:                        12.4   5.08  )-----------( 265      ( 01 Sep 2020 06:07 )             37.5     09-01    140  |  105  |  18  ----------------------------<  94  4.7   |  26  |  0.8    Ca    9.6      01 Sep 2020 06:07  Mg     2.2     09-01        RADIOLOGY:  < from: MR Lower Ext Non-joint w/ IV Cont, Right (08.31.20 @ 20:15) >  Impression:    Multiseptated complex fluid collection measuring 3.5 x 4.1 x 6.8 cm in the medial gastrocnemius muscle contiguous with the popliteal cyst, demonstrating fluid fluid level, slightly hyperintense on T1 and containing debris/loose bodies with thickened wall enhancement. Differential includes infected popliteal cyst/abscess versus hematoma. Recommend aspiration/tissue sampling.    Moderate knee joint effusion.    Right total knee arthroplasty, without definite MRI evidence of osteolysis.    < end of copied text >        Assessment and Plan:   80 year old female with PMHx of HTN, CAD s/p stenting, Right Knee replacement 3 years ago who presents with right leg posterior calf pain and ecchymosis.   ***Knee Hardware and Cardiac Stent safe for MRI***      # RLE posterior pain and ecchymosis - DDx: Infected popliteal cyst/Abscess vs. Hematoma  - Noted gradual and progressive nature of pain; Not on any anticoagulation at this time  - CT results noted; 2.5x2.5x4cm intramuscular complex process   - IR consulted for drainage.   - f/u Ortho c/s  ***HOLD LOVENOX FOR PROCEDURE***    # HTN  - Takes Amlodipine/Benzapril at home; continue on Amlodipine and Lisinopril while inpatient     # HLD  - Takes zetia 10mg at home; will hold for now as med is non formulary  - Patient advised to have her medication brought to the hospital to complete non-formulary process    # GERD  - Protonix    # H/o CAD s/p PCI  - Continue on ASA     Activity: As tolerated  Diet: DASH  DVT ppx: Lovenox  GI ppx: Protonix  Code Status: Full Code  DISPO: From Home;   Provider Handoff: Pending fluid aspiration.

## 2020-09-01 NOTE — PROGRESS NOTE ADULT - SUBJECTIVE AND OBJECTIVE BOX
KELLYDANA REYES  80y  Female  HPI:  Patient is a 80 year old female with Medical Hx of Essential HTN, CAD s/p stents, R knee replacement (3 years ago) who presents with Right posterior leg pain. Pain intermittent and sharp initially, started on Wednesday, pain was btw 7 and 8 in intensity, no radiation, did not notice any palliative element but was provoked by movement. Patient states on about 8/15/2020 she noticed her entire posterior lower leg was purple in color, looked like a bruise but patient denied any  trauma or   Anticoagulation therapy.  Patient said at the time there was no pain or swelling associated with the purple bruise, but on one week later she started to experience some pain so she went to her PMD who requested a LE doppler to r/o DVT, but test came back negative, but showed a possible ruptured cyst. However, this morning, the pain got unbearable to the   point that she couldn't walk so she decided to come to ED     In ED: vitals and Labs wnl  Trauma w/up negative   CT Right Knee: Approximately 2.5 x 2.5 x 4 cm intramuscular complex process posterior to the proximal tibia.  Differential includes hematoma, phlegmon or soft tissue neoplasm. Findings may represent a complex Baker's cyst  Moderate supra patellar joint effusion with rim enhancement, possibly postoperative in nature, however, infected fluid cannot be excluded    Pt states she takes Baby Asprin, Amlodipine and Ezetimibe, Doses Unknown  Pls complete Med rec in AM: Pharmacy : [facility] (30 Aug 2020 22:05)    MEDICATIONS  (STANDING):  amLODIPine   Tablet 5 milliGRAM(s) Oral daily  aspirin  chewable 81 milliGRAM(s) Oral daily  chlorhexidine 4% Liquid 1 Application(s) Topical <User Schedule>  enoxaparin Injectable 40 milliGRAM(s) SubCutaneous at bedtime  lisinopril 20 milliGRAM(s) Oral daily  pantoprazole    Tablet 40 milliGRAM(s) Oral before breakfast    MEDICATIONS  (PRN):  acetaminophen   Tablet .. 650 milliGRAM(s) Oral every 6 hours PRN Mild Pain (1 - 3)  oxyCODONE    IR 5 milliGRAM(s) Oral every 6 hours PRN Severe Pain (7 - 10)    INTERVAL EVENTS: Patient seen today, son at bedside. Case discussed with ortho team at bedside with patient and her son. Ortho feels lesion should be investigated by MICHA PERERA): 35.6 (09-01-20 @ 14:04), Max: 35.8 (09-01-20 @ 04:49)  HR: 65 (09-01-20 @ 14:04) (63 - 68)  BP: 101/61 (09-01-20 @ 14:04) (101/61 - 117/73)  RR: 18 (09-01-20 @ 14:04) (18 - 18)  SpO2: 97% (08-31-20 @ 20:20) (97% - 97%)  Wt(kg): --Vital Signs Last 24 Hrs  T(C): 35.6 (01 Sep 2020 14:04), Max: 35.8 (01 Sep 2020 04:49)  T(F): 96 (01 Sep 2020 14:04), Max: 96.5 (01 Sep 2020 04:49)  HR: 65 (01 Sep 2020 14:04) (63 - 68)  BP: 101/61 (01 Sep 2020 14:04) (101/61 - 117/73)  BP(mean): --  RR: 18 (01 Sep 2020 14:04) (18 - 18)  SpO2: 97% (31 Aug 2020 20:20) (97% - 97%)    PHYSICAL EXAM:  GENERAL: NAD  NECK: Supple, No JVD  CHEST/LUNG: Clear; No wheezing  HEART: S1, S2,   ABDOMEN: Soft, Nontender, Nondistended; Bowel sounds present  EXTREMITIES: No  edema, Resolving hematoma to right posterior lateral LE, mildly tender to touch, painful when standing       LABS:                        12.4   5.08  )-----------( 265      ( 01 Sep 2020 06:07 )             37.5             09-01    140  |  105  |  18  ----------------------------<  94  4.7   |  26  |  0.8    Ca    9.6      01 Sep 2020 06:07  Mg     2.2     09-01                   RADIOLOGY & ADDITIONAL TESTS:  < from: MR Lower Ext Non-joint w/ IV Cont, Right (08.31.20 @ 20:15) >  Findings:    Reidentified is complex popliteal cyst with dissection/extension into the medial gastrocnemius muscle with a multiseptated complex fluid collection measuring 3.5 x 4.1 x 6.8 cm (transverse by AP by cc), demonstrating fluid fluid level, slightly hyperintense on T1, with many internal debris/loose bodies. There is irregular thickened wall enhancement of this complex collection.    There is a moderate amount of knee joint effusion.    Patient is post right total knee arthroplasty in anatomic alignment. There is no definite MRI evidence of osteolysis.    Edema is noted in the remainder of the medial gastrocnemius muscle. The bone marrow signal is within normal limits. The remainder of the musculature demonstrates normal signal.    Impression:    Multiseptated complex fluid collection measuring 3.5 x 4.1 x 6.8 cm in the medial gastrocnemius muscle contiguous with the popliteal cyst, demonstrating fluid fluid level, slightly hyperintense on T1 and containing debris/loose bodies with thickened wall enhancement. Differential includes infected popliteal cyst/abscess versus hematoma. Recommend aspiration/tissue sampling.    Moderate knee joint effusion.    Right total knee arthroplasty, without definite MRI evidence of osteolysis.        < end of copied text >      < from: Xray Femur 2 Views, Right (08.30.20 @ 18:42) >  Impression:    No acute fracture or acute articular abnormality.    Right knee hardware appears in satisfactory position without evidence of hardware related complication.    Please correlate with prior CT of the lower extremity.        < end of copied text >        < from: CT Lower Extremity w/ IV Cont, Right (08.30.20 @ 13:00) >  FINDINGS:    Streak artifact from right knee prostheses degrades examination and limits evaluation.    No acute fracture or dislocation.    Approximately 2.5 x 2.5 x 4 cm intramuscular complex process posterior proximal tibia. Differential is broad and may represent hematoma, phlegmon or soft tissue neoplasm. Findings may represent a complex Baker's cyst.    There is a moderate suprapatellar joint effusion with rim enhancement, possibly postoperative in nature. However, infected joint fluid cannot be excluded.    There is atherosclerosis. There are multiple areas of narrowing within the anterior tibial artery, incompletely evaluated on this examination. The distal posterior tibial and peroneal arteries are incompletely evaluated.    There are areas of soft tissue swelling along the right lower extremity.    IMPRESSION:    Approximately 2.5 x 2.5 x 4 cm intramuscular complex process posterior to the proximal tibia.    Differential includes hematoma, phlegmon or soft tissue neoplasm. Findings may represent a complex Baker's cyst    Further evaluation with a postcontrast MRI is recommended for further characterization    Moderate supra patellar joint effusion with rim enhancement, possibly postoperative in nature, however, infected fluid cannot be excluded    Multiple areas of narrowing within the anterior tibial artery, incompletely evaluated on this examination. If indicated, a CT angiogram of the right lower extremity can be performed on an outpatient basis.    < end of copied text >        < from: [facility] Duplex Lower Ext Vein Scan, Dante (08.30.20 @ 09:05) >  INTERPRETATION:  Clinical History / Reason for exam: The patient is a 80-year-old female with right lower extremity swelling and pain. A venous duplex examination was performed to evaluate the patient for deep venous thrombosis of the lower extremities.    The common femoral, great saphenous, femoral, popliteal and small saphenous veins were visualized bilaterally with no evidence of deep venous thrombosis    All veins were fully compressible.  There was presence of spontaneous flow, augmentation with distal compression and phasicity.    The anterior tibial veins were  patent    The posterior tibial veins were  patent    The peroneal veins were patent.    Right knee nonspecific fluid collection 1.6 x 1.9 x 2.2 cm  Left popliteal fossa Baker's cyst 1.1 x 1.8 x 3.1 cm    Impression:    No evidence of deep venous thrombosis or superficial thrombophlebitis in the bilateral lower extremities.  Right knee nonspecific fluid collection 1.6 x 1.9 x 2.2 cm  Left popliteal fossa Baker's cyst 1.1 x 1.8 x 3.1 cm    < end of copied text >

## 2020-09-01 NOTE — PROGRESS NOTE ADULT - ASSESSMENT
ORTHO PROGRESS NOTE     80y Female with right leg pain secondary to soft tissue collection    Patient seen and examined at bedside . The patient is awake and alert in NAD. No complaints of chest pain, SOB, N/V.    MEDICATIONS  (STANDING):  amLODIPine   Tablet 5 milliGRAM(s) Oral daily  aspirin  chewable 81 milliGRAM(s) Oral daily  chlorhexidine 4% Liquid 1 Application(s) Topical <User Schedule>  enoxaparin Injectable 40 milliGRAM(s) SubCutaneous at bedtime  lisinopril 20 milliGRAM(s) Oral daily  pantoprazole    Tablet 40 milliGRAM(s) Oral before breakfast    MEDICATIONS  (PRN):  acetaminophen   Tablet .. 650 milliGRAM(s) Oral every 6 hours PRN Mild Pain (1 - 3)  oxyCODONE    IR 5 milliGRAM(s) Oral every 6 hours PRN Severe Pain (7 - 10)      Vital Signs Last 24 Hrs  T(C): 35.8 (01 Sep 2020 04:49), Max: 35.8 (01 Sep 2020 04:49)  T(F): 96.5 (01 Sep 2020 04:49), Max: 96.5 (01 Sep 2020 04:49)  HR: 63 (01 Sep 2020 04:49) (63 - 99)  BP: 110/61 (01 Sep 2020 04:49) (101/56 - 146/85)  BP(mean): --  RR: 18 (01 Sep 2020 04:49) (18 - 18)  SpO2: 97% (31 Aug 2020 20:20) (97% - 97%)    Physical Exam:  General: NAD, Alert, Awake and oriented    RLE:  No open skin or wounds  TTP medial tibial plateau, soft tissue posteriorly. Boggy tissue but no discrete mass. Eccymosis posterior leg extending distally.  AROM knee 0-130 without pain  stable to v/v, a/p drawer  Full baseline painless ROM at hip, knee, ankle and toes   Able to actively SLR.  SILT DP/SP/T/Bearden/Sa.   EHL/FHL/TA/Gs motor intact.  2+ DP/PT pulses with brisk cap refill distally.  Compartments soft and compressible.   No pain on passive stretch.                               12.4   5.08  )-----------( 265      ( 01 Sep 2020 06:07 )             37.5     09-01    140  |  105  |  18  ----------------------------<  94  4.7   |  26  |  0.8    Ca    9.6      01 Sep 2020 06:07  Mg     2.2     09-01                A/P:    80y Female with right leg pain secondary to soft tissue collection  -TKA appears well fixed, no signs of intraarticular pathology  -collection concerning for hematoma vs. infection vs. malignancy. Recommend IR guided biopsy inpatient vs. outpatient, will discuss with attending  -please obtain ESR/CRP  -pain control  -DVT ppx  -ortho to follow
Pt Name: DANA LOPEZ  MRN: [id number]      HPI: 80yFemale presents with pain in right knee. Patient states that 10 days ago she began having ecchymosis posterior leg. Went to PCP who recommended duplex, was neg. Was not initially painful, pain came on 4 days ago, worse with weight bearing. R TKA done at [facility] in 2017 with Dr. Londono. Had dental cleaning 3 weeks ago, took abx ppx.  Denies pain elsewhere. Denies paresthesias.      PAST MEDICAL & SURGICAL HISTORY:  Anxiety  GERD (gastroesophageal reflux disease)  CAD (coronary artery disease)  Hypertension  S/P cholecystectomy  H/O total knee replacement, right  H/O abdominal hysterectomy      Allergies: No Known Allergies      Medications: acetaminophen   Tablet .. 650 milliGRAM(s) Oral every 6 hours PRN  amLODIPine   Tablet 5 milliGRAM(s) Oral daily  aspirin  chewable 81 milliGRAM(s) Oral daily  chlorhexidine 4% Liquid 1 Application(s) Topical <User Schedule>  enoxaparin Injectable 40 milliGRAM(s) SubCutaneous at bedtime  lisinopril 20 milliGRAM(s) Oral daily  oxyCODONE    IR 5 milliGRAM(s) Oral every 6 hours PRN  pantoprazole    Tablet 40 milliGRAM(s) Oral before breakfast    PHYSICAL EXAM:    Vital Signs Last 24 Hrs  T(C): 35.6 (31 Aug 2020 14:00), Max: 36.1 (30 Aug 2020 19:25)  T(F): 96 (31 Aug 2020 14:00), Max: 97 (30 Aug 2020 19:25)  HR: 99 (31 Aug 2020 14:00) (75 - 99)  BP: 101/56 (31 Aug 2020 14:00) (101/56 - 146/85)  BP(mean): --  RR: 18 (31 Aug 2020 14:00) (18 - 20)  SpO2: 98% (31 Aug 2020 04:34) (98% - 99%)    Physical Exam:  General: NAD, Alert, Awake and oriented    RLE:  No open skin or wounds  TTP medial tibial plateau, soft tissue posteriorly. Boggy tissue but no discrete mass. Eccymosis posterior leg extending distally.  AROM knee 0-130 without pain  stable to v/v, a/p drawer  Full baseline painless ROM at hip, knee, ankle and toes   Able to actively SLR.  SILT DP/SP/T/Bearden/Sa.   EHL/FHL/TA/Gs motor intact.  2+ DP/PT pulses with brisk cap refill distally.  Compartments soft and compressible.   No pain on passive stretch.    Labs:                        12.8   4.78  )-----------( 248      ( 31 Aug 2020 07:57 )             38.4     08-31    141  |  102  |  18  ----------------------------<  93  4.7   |  27  |  0.7    Ca    9.6      31 Aug 2020 07:57    TPro  6.7  /  Alb  4.7  /  TBili  0.7  /  DBili  x   /  AST  21  /  ALT  17  /  AlkPhos  78  08-30    Imaging: No obvious fracture or dislocation.     A/P:    80y Female with right leg pain secondary to soft tissue component, mass on CT scan  -TKA appears well fixed, no signs of intraarticular pathology  -please obtain ESR/CRP  -F/U MRI  -ortho to follow
ASSESSMENT   Right posterior leg pain with intramuscular complex process posterior to proximal tibia seen on CT scan and US, multiple differential diagnosis   Possible ddx include Infected Hematoma vs Abscess vs bakers cyst     RECOMMENDATION:  Wound care - no wound care right now as there is there no wound   F/U Orthopedics   F/U MRI for further evaluation   Consider IR Consult  - for possible drainage as this collection is complex and intramuscular  IV abx as indicated/ per ID  Seen with Dr. Clarke   Will follow.

## 2020-09-02 LAB
ANION GAP SERPL CALC-SCNC: 9 MMOL/L — SIGNIFICANT CHANGE UP (ref 7–14)
B PERT IGG+IGM PNL SER: ABNORMAL
BLD GP AB SCN SERPL QL: SIGNIFICANT CHANGE UP
BUN SERPL-MCNC: 20 MG/DL — SIGNIFICANT CHANGE UP (ref 10–20)
CALCIUM SERPL-MCNC: 9.8 MG/DL — SIGNIFICANT CHANGE UP (ref 8.5–10.1)
CHLORIDE SERPL-SCNC: 106 MMOL/L — SIGNIFICANT CHANGE UP (ref 98–110)
CO2 SERPL-SCNC: 26 MMOL/L — SIGNIFICANT CHANGE UP (ref 17–32)
COLOR FLD: SIGNIFICANT CHANGE UP
CREAT SERPL-MCNC: 0.8 MG/DL — SIGNIFICANT CHANGE UP (ref 0.7–1.5)
ERYTHROCYTE [SEDIMENTATION RATE] IN BLOOD: 10 MM/HR — SIGNIFICANT CHANGE UP (ref 0–20)
FLUID INTAKE SUBSTANCE CLASS: SIGNIFICANT CHANGE UP
FLUID SEGMENTED GRANULOCYTES: SIGNIFICANT CHANGE UP %
GLUCOSE SERPL-MCNC: 96 MG/DL — SIGNIFICANT CHANGE UP (ref 70–99)
HCT VFR BLD CALC: 38.4 % — SIGNIFICANT CHANGE UP (ref 37–47)
HGB BLD-MCNC: 12.3 G/DL — SIGNIFICANT CHANGE UP (ref 12–16)
LYMPHOCYTES # FLD: SIGNIFICANT CHANGE UP
MAGNESIUM SERPL-MCNC: 2.2 MG/DL — SIGNIFICANT CHANGE UP (ref 1.8–2.4)
MCHC RBC-ENTMCNC: 32 G/DL — SIGNIFICANT CHANGE UP (ref 32–37)
MCHC RBC-ENTMCNC: 32.3 PG — HIGH (ref 27–31)
MCV RBC AUTO: 100.8 FL — HIGH (ref 81–99)
MONOS+MACROS # FLD: SIGNIFICANT CHANGE UP %
NRBC # BLD: 0 /100 WBCS — SIGNIFICANT CHANGE UP (ref 0–0)
PLATELET # BLD AUTO: 273 K/UL — SIGNIFICANT CHANGE UP (ref 130–400)
POTASSIUM SERPL-MCNC: 4.7 MMOL/L — SIGNIFICANT CHANGE UP (ref 3.5–5)
POTASSIUM SERPL-SCNC: 4.7 MMOL/L — SIGNIFICANT CHANGE UP (ref 3.5–5)
RBC # BLD: 3.81 M/UL — LOW (ref 4.2–5.4)
RBC # FLD: 13.4 % — SIGNIFICANT CHANGE UP (ref 11.5–14.5)
RCV VOL RI: HIGH /UL (ref 0–0)
SODIUM SERPL-SCNC: 141 MMOL/L — SIGNIFICANT CHANGE UP (ref 135–146)
TOTAL NUCLEATED CELL COUNT, BODY FLUID: 370 /UL — SIGNIFICANT CHANGE UP
TUBE TYPE: SIGNIFICANT CHANGE UP
WBC # BLD: 5.05 K/UL — SIGNIFICANT CHANGE UP (ref 4.8–10.8)
WBC # FLD AUTO: 5.05 K/UL — SIGNIFICANT CHANGE UP (ref 4.8–10.8)

## 2020-09-02 PROCEDURE — 10160 PNXR ASPIR ABSC HMTMA BULLA: CPT

## 2020-09-02 PROCEDURE — 76942 ECHO GUIDE FOR BIOPSY: CPT | Mod: 26

## 2020-09-02 RX ORDER — SENNA PLUS 8.6 MG/1
2 TABLET ORAL AT BEDTIME
Refills: 0 | Status: DISCONTINUED | OUTPATIENT
Start: 2020-09-02 | End: 2020-09-03

## 2020-09-02 RX ADMIN — SENNA PLUS 2 TABLET(S): 8.6 TABLET ORAL at 21:49

## 2020-09-02 RX ADMIN — Medication 81 MILLIGRAM(S): at 11:38

## 2020-09-02 RX ADMIN — PANTOPRAZOLE SODIUM 40 MILLIGRAM(S): 20 TABLET, DELAYED RELEASE ORAL at 06:06

## 2020-09-02 RX ADMIN — LISINOPRIL 20 MILLIGRAM(S): 2.5 TABLET ORAL at 06:06

## 2020-09-02 RX ADMIN — CHLORHEXIDINE GLUCONATE 1 APPLICATION(S): 213 SOLUTION TOPICAL at 06:05

## 2020-09-02 NOTE — PROGRESS NOTE ADULT - SUBJECTIVE AND OBJECTIVE BOX
INTERVENTIONAL RADIOLOGY BRIEF-OPERATIVE NOTE    Procedure: US-guided right lower extremity fluid collection aspiration    Pre-Op Diagnosis: Right lower extremity fluid collection    Post-Op Diagnosis: Same    Attending: Landau  Resident: Seng     Anesthesia (type):  [ ] General Anesthesia  [ ] Sedation  [ ] Spinal Anesthesia  [x] Local/Regional    Contrast: none    Estimated Blood Loss: <5cc    Condition:   [ ] Critical  [ ] Serious  [ ] Fair   [x] Good    Findings/Follow up Plan of Care: Initial US demonstrated complex fluid collection in right lower extremity below knee with apparent communication with joint fluid. Measures greater than 6cm in greatest dimension. s/p aspiration of approximately 7cc bloody fluid with 5fr Yueh. Sample submitted to lab.     Specimens Removed: Fluid as above    Implants: none    Complications: none    Disposition:  d/c back to floor. f/u fluid studies.       Please call Interventional Radiology [phone] with any questions, concerns, or issues.

## 2020-09-02 NOTE — PROGRESS NOTE ADULT - SUBJECTIVE AND OBJECTIVE BOX
Chart reviewed, patient examined. Pertinent results reviewed.  Case discussed with HO; specialist f/u reviewed  HD#3; pt had FNA by IR this AM      CHIEF COMPLAINT:   Patient is a 80y old  Female who presented with a chief complaint of Right Leg Pain (01 Sep 2020 14:43)      OVER Past 24hrs:  The patient was seen and examined at bedside there were    HISTORY OF PRESENTING ILLNESS:    HPI:  Patient is a 80 year old female with Medical Hx of Essential HTN, CAD s/p stents, R knee replacement (3 years ago) who presents with Right posterior leg pain. Pain intermittent and sharp initially, started on Wednesday, pain was btw 7 and 8 in intensity, no radiation, did not notice any palliative element but was provoked by movement. Patient states on about 8/15/2020 she noticed her entire posterior lower leg was purple in color, looked like a bruise but patient denied any  trauma.   Pt is not on Anticoagulation therapy.  Patient said at the time there was no pain or swelling associated with the purple bruise, but on one week later she started to experience some pain so she went to her PMD who requested a LE doppler to r/o DVT, but test came back negative, but showed a possible ruptured cyst. However, this morning, the pain got unbearable to the   point that she couldn't walk so she decided to come to ED     In ED: vitals and Labs wnl  Trauma w/up negative   CT Right Knee: Approximately 2.5 x 2.5 x 4 cm intramuscular complex process posterior to the proximal tibia.  Differential includes hematoma, phlegmon or soft tissue neoplasm. Findings may represent a complex Baker's cyst  Moderate supra patellar joint effusion with rim enhancement, possibly postoperative in nature, however, infected fluid cannot be excluded    Pt states she takes Baby Asprin, Amlodipine and Ezetimibe, Doses Unknown  Pls complete Med rec in AM: [facility] : [facility] [address] (30 Aug 2020 22:05)    PAST MEDICAL & SURGICAL HISTORY  PAST MEDICAL & SURGICAL HISTORY:  Anxiety  GERD (gastroesophageal reflux disease)  CAD (coronary artery disease)  Hypertension  S/P cholecystectomy  H/O total knee replacement, right  H/O abdominal hysterectomy      ALLERGIES:  No Known Allergies    MEDICATIONS:  STANDING MEDICATIONS  amLODIPine   Tablet 5 milliGRAM(s) Oral daily  aspirin  chewable 81 milliGRAM(s) Oral daily  chlorhexidine 4% Liquid 1 Application(s) Topical <User Schedule>  lisinopril 20 milliGRAM(s) Oral daily  pantoprazole    Tablet 40 milliGRAM(s) Oral before breakfast    PRN MEDICATIONS  acetaminophen   Tablet .. 650 milliGRAM(s) Oral every 6 hours PRN  oxyCODONE    IR 5 milliGRAM(s) Oral every 6 hours PRN    VITALS:   T(F): 96.7  HR: 63  BP: 107/55  RR: 18  SpO2: 98%  Vital Signs Last 24 Hrs  T(C): 35.9 (02 Sep 2020 05:27), Max: 36.8 (01 Sep 2020 21:17)  T(F): 96.7 (02 Sep 2020 05:27), Max: 98.2 (01 Sep 2020 21:17)  HR: 63 (02 Sep 2020 05:27) (63 - 80)  BP: 107/55 (02 Sep 2020 05:27) (101/61 - 110/70)  BP(mean): --  RR: 18 (02 Sep 2020 05:27) (18 - 18)  SpO2: 98% (01 Sep 2020 21:17) (98% - 98%)    PHYSICAL EXAM:  General: No acute distress.  Alert, oriented, interactive, nonfocal.    HEENT: Pupils equal, reactive to light symmetrically.    PULM: Clear to auscultation bilaterally, no significant sputum production.    CVS: Regular rate and rhythm, no murmurs, rubs, or gallops.    Abdomen: Soft, nondistended, nontender.    Extremities: Purplish skin discoloration over the posterior aspect of the RIGHT knee.     SKIN: Warm and well perfused, no rashes noted; except as above.       LABS:                        12.4   5.08  )-----------( 265      ( 01 Sep 2020 06:07 )             37.5     09-01    140  |  105  |  18  ----------------------------<  94  4.7   |  26  |  0.8    Ca    9.6      01 Sep 2020 06:07  Mg     2.2     09-01      PT/INR - ( 01 Sep 2020 22:15 )   PT: 12.40 sec;   INR: 1.08 ratio         PTT - ( 01 Sep 2020 22:15 )  PTT:30.4 sec      Sedimentation Rate, Erythrocyte: 10 mm/Hr (09-02-20 @ 00:46)          RADIOLOGY:      Assessment and Plan:   80 year old female with PMHx of HTN, CAD s/p stenting, Right Knee replacement 3 years ago who presents with right leg posterior calf pain and ecchymosis.   ***Knee Hardware and Cardiac Stent safe for MRI***      # RLE posterior pain and ecchymosis - DDx: Infected popliteal cyst/Abscess vs. Hematoma  - Noted gradual and progressive nature of pain; Not on any anticoagulation at this time  - CT results noted; 2.5x2.5x4cm intramuscular complex process   - IR consulted for drainage.   - f/u Ortho c/s  ***HOLD LOVENOX FOR PROCEDURE***    # HTN  - Takes Amlodipine/Benzapril at home; continue on Amlodipine and Lisinopril while inpatient     # HLD  - Takes zetia 10mg at home; will hold for now as med is non formulary  - Patient advised to have her medication brought to the hospital to complete non-formulary process    # GERD  - Protonix    # H/o CAD s/p PCI  - Continue on ASA     Activity: As tolerated  Diet: DASH  DVT ppx: Lovenox  GI ppx: Protonix  Code Status: Full Code  DISPO: From Home;   Provider Handoff: Pending fluid aspiration. Chart reviewed, patient examined. Pertinent results reviewed.  Case discussed with HO; specialist f/u reviewed  HD#3; pt had FNA by IR this AM      CHIEF COMPLAINT:   Patient is a 80y old  Female who presented with a chief complaint of Right Leg Pain (01 Sep 2020 14:43)      OVER Past 24hrs:  IR procedure this AM. Still with pain which increases on wt bearing. She is able to walk.    HISTORY OF PRESENTING ILLNESS:    HPI:  Patient is a 80 year old female with Medical Hx of Essential HTN, CAD s/p stents, R knee replacement (3 years ago) who presents with Right posterior leg pain. Pain intermittent and sharp initially, started on Wednesday, pain was btw 7 and 8 in intensity, no radiation, did not notice any palliative element but was provoked by movement. Patient states on about 8/15/2020 she noticed her entire posterior lower leg was purple in color, looked like a bruise but patient denied any  trauma.   Pt is not on Anticoagulation therapy.  Patient said at the time there was no pain or swelling associated with the purple bruise, but on one week later she started to experience some pain so she went to her PMD who requested a LE doppler to r/o DVT, but test came back negative, but showed a possible ruptured cyst. However, this morning, the pain got unbearable to the   point that she couldn't walk so she decided to come to ED     In ED: vitals and Labs wnl  Trauma w/up negative   CT Right Knee: Approximately 2.5 x 2.5 x 4 cm intramuscular complex process posterior to the proximal tibia.  Differential includes hematoma, phlegmon or soft tissue neoplasm. Findings may represent a complex Baker's cyst  Moderate supra patellar joint effusion with rim enhancement, possibly postoperative in nature, however, infected fluid cannot be excluded    Pt states she takes Baby Asprin, Amlodipine and Ezetimibe, Doses Unknown  Pls complete Med rec in AM: Pharmacy : [facility] [address] (30 Aug 2020 22:05)    PAST MEDICAL & SURGICAL HISTORY  PAST MEDICAL & SURGICAL HISTORY:  Anxiety  GERD (gastroesophageal reflux disease)  CAD (coronary artery disease)--multiple stents-2007, 2009, 2011  Hypertension  S/P cholecystectomy  H/O total knee replacement, right  H/O abdominal hysterectomy      ALLERGIES:  No Known Allergies    MEDICATIONS:  STANDING MEDICATIONS  amLODIPine   Tablet 5 milliGRAM(s) Oral daily  aspirin  chewable 81 milliGRAM(s) Oral daily  chlorhexidine 4% Liquid 1 Application(s) Topical <User Schedule>  lisinopril 20 milliGRAM(s) Oral daily  pantoprazole    Tablet 40 milliGRAM(s) Oral before breakfast    PRN MEDICATIONS  acetaminophen   Tablet .. 650 milliGRAM(s) Oral every 6 hours PRN  oxyCODONE    IR 5 milliGRAM(s) Oral every 6 hours PRN    VITALS:   T(F): 96.7  HR: 63  BP: 107/55  RR: 18  SpO2: 98%  Vital Signs Last 24 Hrs  T(C): 35.9 (02 Sep 2020 05:27), Max: 36.8 (01 Sep 2020 21:17)  T(F): 96.7 (02 Sep 2020 05:27), Max: 98.2 (01 Sep 2020 21:17)  HR: 63 (02 Sep 2020 05:27) (63 - 80)  BP: 107/55 (02 Sep 2020 05:27) (101/61 - 110/70)  BP(mean): --  RR: 18 (02 Sep 2020 05:27) (18 - 18)  SpO2: 98% (01 Sep 2020 21:17) (98% - 98%)    PHYSICAL EXAM:  General: No acute distress.  AAOx4, very pleasant  HEENT: EOMI, PEERLA  PULM: Clear bilaterally, no wheezes or rhonchi  CVS: RRR, no MRG  Abdomen: Soft, nondistended, nontender.  Extremities: Purplish skin discoloration over the posterior aspect of the RIGHT knee.- evolving ecchymosis; TTP abot 4cm below          posterior knee-IR site   SKIN: Warm and well perfused, no rashes noted; except as above.   NEURO: no deficit noted      LABS:                        12.4   5.08  )-----------( 265      ( 01 Sep 2020 06:07 )             37.5     09-01    140  |  105  |  18  ----------------------------<  94  4.7   |  26  |  0.8    Ca    9.6      01 Sep 2020 06:07  Mg     2.2     09-01      PT/INR - ( 01 Sep 2020 22:15 )   PT: 12.40 sec;   INR: 1.08 ratio         PTT - ( 01 Sep 2020 22:15 )  PTT:30.4 sec      Sedimentation Rate, Erythrocyte: 10 mm/Hr (09-02-20 @ 00:46)          RADIOLOGY:  < from: MR Lower Ext Non-joint w/ IV Cont, Right (08.31.20 @ 20:15) >    Impression:    Multiseptated complex fluid collection measuring 3.5 x 4.1 x 6.8 cm in the medial gastrocnemius muscle contiguous with the popliteal cyst, demonstrating fluid fluid level, slightly hyperintense on T1 and containing debris/loose bodies with thickened wall enhancement. Differential includes infected popliteal cyst/abscess versus hematoma. Recommend aspiration/tissue sampling.    Moderate knee joint effusion.    Right total knee arthroplasty, without definite MRI evidence of osteolysis.      Spoke with Dr. NEWELL on 9/1/2020 9:23 AM with readback.          < end of copied text >      Assessment and Plan:   80 year old female with PMHx of HTN, CAD s/p stenting, Right Knee replacement 3 years ago who presents with right leg posterior calf pain and ecchymosis.   ***Knee Hardware and Cardiac Stent safe for MRI***      # RLE posterior pain and ecchymosis - DDx: Infected popliteal cyst/Abscess vs. Hematoma  - Noted gradual and progressive nature of pain; Not on any anticoagulation at this time (ASA only)  - CT results noted; 2.5x2.5x4cm intramuscular complex process   - IR consulted for drainage. -done today- felt with + tenderness, infection is very possible  - f/u Ortho c/s  - I called and consulted ID- Dr Pimentel- to review & advise for possible Antibiotic RX       -if PO, then aim for DC home later or in AM after ID review    # HTN  - Takes Amlodipine/Benzapril at home; continue on Amlodipine and Lisinopril while inpatient     # HLD  - Takes zetia 10mg at home; will hold for now as med is non formulary  - Patient advised to have her medication brought to the hospital to complete non-formulary process    # GERD  - Protonix    # H/o CAD s/p PCI  - Continue on ASA     Activity: As tolerated  Diet: DASH  DVT ppx: Lovenox  GI ppx: Protonix  Code Status: Full Code  DISPO: From Home;   Provider Handoff: Pending fluid aspiration, and ID evaluation

## 2020-09-02 NOTE — PROGRESS NOTE ADULT - SUBJECTIVE AND OBJECTIVE BOX
LENGTH OF HOSPITAL STAY: 3d      CHIEF COMPLAINT:   Patient is a 80y old  Female who presents with a chief complaint of Right Leg Pain (01 Sep 2020 14:43)      OVER Past 24hrs:  The patient was seen and examined at bedside there were    HISTORY OF PRESENTING ILLNESS:    HPI:  Patient is a 80 year old female with Medical Hx of Essential HTN, CAD s/p stents, R knee replacement (3 years ago) who presents with Right posterior leg pain. Pain intermittent and sharp initially, started on Wednesday, pain was btw 7 and 8 in intensity, no radiation, did not notice any palliative element but was provoked by movement. Patient states on about 8/15/2020 she noticed her entire posterior lower leg was purple in color, looked like a bruise but patient denied any  trauma or   Anticoagulation therapy.  Patient said at the time there was no pain or swelling associated with the purple bruise, but on one week later she started to experience some pain so she went to her PMD who requested a LE doppler to r/o DVT, but test came back negative, but showed a possible ruptured cyst. However, this morning, the pain got unbearable to the   point that she couldn't walk so she decided to come to ED     In ED: vitals and Labs wnl  Trauma w/up negative   CT Right Knee: Approximately 2.5 x 2.5 x 4 cm intramuscular complex process posterior to the proximal tibia.  Differential includes hematoma, phlegmon or soft tissue neoplasm. Findings may represent a complex Baker's cyst  Moderate supra patellar joint effusion with rim enhancement, possibly postoperative in nature, however, infected fluid cannot be excluded    Pt states she takes Baby Asprin, Amlodipine and Ezetimibe, Doses Unknown  Pls complete Med rec in AM: [facility] (30 Aug 2020 22:05)    PAST MEDICAL & SURGICAL HISTORY  PAST MEDICAL & SURGICAL HISTORY:  Anxiety  GERD (gastroesophageal reflux disease)  CAD (coronary artery disease)  Hypertension  S/P cholecystectomy  H/O total knee replacement, right  H/O abdominal hysterectomy      ALLERGIES:  No Known Allergies    MEDICATIONS:  STANDING MEDICATIONS  amLODIPine   Tablet 5 milliGRAM(s) Oral daily  aspirin  chewable 81 milliGRAM(s) Oral daily  chlorhexidine 4% Liquid 1 Application(s) Topical <User Schedule>  lisinopril 20 milliGRAM(s) Oral daily  pantoprazole    Tablet 40 milliGRAM(s) Oral before breakfast    PRN MEDICATIONS  acetaminophen   Tablet .. 650 milliGRAM(s) Oral every 6 hours PRN  oxyCODONE    IR 5 milliGRAM(s) Oral every 6 hours PRN    VITALS:   T(F): 96.7  HR: 63  BP: 107/55  RR: 18  SpO2: 98%    PHYSICAL EXAM:  General: No acute distress.  Alert, oriented, interactive, nonfocal.    HEENT: Pupils equal, reactive to light symmetrically.    PULM: Clear to auscultation bilaterally, no significant sputum production.    CVS: Regular rate and rhythm, no murmurs, rubs, or gallops.    Abdomen: Soft, nondistended, nontender.    Extremities: Purplish skin discoloration over the posterior aspect of the RIGHT knee.     SKIN: Warm and well perfused, no rashes noted; except as above.       LABS:                        12.4   5.08  )-----------( 265      ( 01 Sep 2020 06:07 )             37.5     09-01    140  |  105  |  18  ----------------------------<  94  4.7   |  26  |  0.8    Ca    9.6      01 Sep 2020 06:07  Mg     2.2     09-01      PT/INR - ( 01 Sep 2020 22:15 )   PT: 12.40 sec;   INR: 1.08 ratio         PTT - ( 01 Sep 2020 22:15 )  PTT:30.4 sec      Sedimentation Rate, Erythrocyte: 10 mm/Hr (09-02-20 @ 00:46)          RADIOLOGY:      Assessment and Plan:   80 year old female with PMHx of HTN, CAD s/p stenting, Right Knee replacement 3 years ago who presents with right leg posterior calf pain and ecchymosis.   ***Knee Hardware and Cardiac Stent safe for MRI***      # RLE posterior pain and ecchymosis - DDx: Infected popliteal cyst/Abscess vs. Hematoma  - Noted gradual and progressive nature of pain; Not on any anticoagulation at this time  - CT results noted; 2.5x2.5x4cm intramuscular complex process   - IR consulted for drainage.   - f/u Ortho c/s  ***HOLD LOVENOX FOR PROCEDURE***    # HTN  - Takes Amlodipine/Benzapril at home; continue on Amlodipine and Lisinopril while inpatient     # HLD  - Takes zetia 10mg at home; will hold for now as med is non formulary  - Patient advised to have her medication brought to the hospital to complete non-formulary process    # GERD  - Protonix    # H/o CAD s/p PCI  - Continue on ASA     Activity: As tolerated  Diet: DASH  DVT ppx: Lovenox  GI ppx: Protonix  Code Status: Full Code  DISPO: From Home;   Provider Handoff: Pending fluid aspiration. LENGTH OF HOSPITAL STAY: 3d      CHIEF COMPLAINT:   Patient is a 80y old  Female who presents with a chief complaint of Right Leg Pain (01 Sep 2020 14:43)      OVER Past 24hrs:  The patient was seen and examined at bedside there were    HISTORY OF PRESENTING ILLNESS:    HPI:  Patient is a 80 year old female with Medical Hx of Essential HTN, CAD s/p stents, R knee replacement (3 years ago) who presents with Right posterior leg pain. Pain intermittent and sharp initially, started on Wednesday, pain was btw 7 and 8 in intensity, no radiation, did not notice any palliative element but was provoked by movement. Patient states on about 8/15/2020 she noticed her entire posterior lower leg was purple in color, looked like a bruise but patient denied any  trauma or   Anticoagulation therapy.  Patient said at the time there was no pain or swelling associated with the purple bruise, but on one week later she started to experience some pain so she went to her PMD who requested a LE doppler to r/o DVT, but test came back negative, but showed a possible ruptured cyst. However, this morning, the pain got unbearable to the   point that she couldn't walk so she decided to come to ED     In ED: vitals and Labs wnl  Trauma w/up negative   CT Right Knee: Approximately 2.5 x 2.5 x 4 cm intramuscular complex process posterior to the proximal tibia.  Differential includes hematoma, phlegmon or soft tissue neoplasm. Findings may represent a complex Baker's cyst  Moderate supra patellar joint effusion with rim enhancement, possibly postoperative in nature, however, infected fluid cannot be excluded    Pt states she takes Baby Asprin, Amlodipine and Ezetimibe, Doses Unknown  Pls complete Med rec in AM: [facility] (30 Aug 2020 22:05)    PAST MEDICAL & SURGICAL HISTORY  PAST MEDICAL & SURGICAL HISTORY:  Anxiety  GERD (gastroesophageal reflux disease)  CAD (coronary artery disease)  Hypertension  S/P cholecystectomy  H/O total knee replacement, right  H/O abdominal hysterectomy      ALLERGIES:  No Known Allergies    MEDICATIONS:  STANDING MEDICATIONS  amLODIPine   Tablet 5 milliGRAM(s) Oral daily  aspirin  chewable 81 milliGRAM(s) Oral daily  chlorhexidine 4% Liquid 1 Application(s) Topical <User Schedule>  lisinopril 20 milliGRAM(s) Oral daily  pantoprazole    Tablet 40 milliGRAM(s) Oral before breakfast    PRN MEDICATIONS  acetaminophen   Tablet .. 650 milliGRAM(s) Oral every 6 hours PRN  oxyCODONE    IR 5 milliGRAM(s) Oral every 6 hours PRN    VITALS:   T(F): 96.7  HR: 63  BP: 107/55  RR: 18  SpO2: 98%    PHYSICAL EXAM:  General: No acute distress.  Alert, oriented, interactive, nonfocal.    HEENT: Pupils equal, reactive to light symmetrically.    PULM: Clear to auscultation bilaterally, no significant sputum production.    CVS: Regular rate and rhythm, no murmurs, rubs, or gallops.    Abdomen: Soft, nondistended, nontender.    Extremities: Dressed procedure site.     SKIN: Warm and well perfused, no rashes noted; except as above.       LABS:                        12.4   5.08  )-----------( 265      ( 01 Sep 2020 06:07 )             37.5     09-01    140  |  105  |  18  ----------------------------<  94  4.7   |  26  |  0.8    Ca    9.6      01 Sep 2020 06:07  Mg     2.2     09-01      PT/INR - ( 01 Sep 2020 22:15 )   PT: 12.40 sec;   INR: 1.08 ratio         PTT - ( 01 Sep 2020 22:15 )  PTT:30.4 sec      Sedimentation Rate, Erythrocyte: 10 mm/Hr (09-02-20 @ 00:46)        Assessment and Plan:   80 year old female with PMHx of HTN, CAD s/p stenting, Right Knee replacement 3 years ago who presents with right leg posterior calf pain and ecchymosis.   ***Knee Hardware and Cardiac Stent safe for MRI***      # RLE posterior pain and ecchymosis - DDx: Infected popliteal cyst/Abscess vs. Hematoma  - Noted gradual and progressive nature of pain; Not on any anticoagulation at this time  - CT results noted; 2.5x2.5x4cm intramuscular complex process   - IR image guided cyst aspiration: s/p 7cc Bloody fluid; f/u aspirate fluid Cell Count / Cx / G-stain  - f/u ID c/s   ***OK RESTART LOVENOX TOMORROW IF NOT D/C***    # HTN  - Takes Amlodipine/Benzapril at home; continue on Amlodipine and Lisinopril while inpatient     # HLD  - Takes zetia 10mg at home; will hold for now as med is non formulary  - Patient advised to have her medication brought to the hospital to complete non-formulary process    # GERD  - Protonix    # H/o CAD s/p PCI  - Continue on ASA     Activity: As tolerated  Diet: DASH  DVT ppx: Lovenox  GI ppx: Protonix  Code Status: Full Code  DISPO: From Home;   Provider Handoff: Pending aspirate fluid studies - ID c/s

## 2020-09-03 ENCOUNTER — TRANSCRIPTION ENCOUNTER (OUTPATIENT)
Age: 80
End: 2020-09-03

## 2020-09-03 VITALS — OXYGEN SATURATION: 95 %

## 2020-09-03 LAB
ANION GAP SERPL CALC-SCNC: 12 MMOL/L — SIGNIFICANT CHANGE UP (ref 7–14)
BUN SERPL-MCNC: 19 MG/DL — SIGNIFICANT CHANGE UP (ref 10–20)
CALCIUM SERPL-MCNC: 9.5 MG/DL — SIGNIFICANT CHANGE UP (ref 8.5–10.1)
CHLORIDE SERPL-SCNC: 102 MMOL/L — SIGNIFICANT CHANGE UP (ref 98–110)
CO2 SERPL-SCNC: 25 MMOL/L — SIGNIFICANT CHANGE UP (ref 17–32)
CREAT SERPL-MCNC: 0.8 MG/DL — SIGNIFICANT CHANGE UP (ref 0.7–1.5)
CRP SERPL-MCNC: <0.1 MG/DL — SIGNIFICANT CHANGE UP (ref 0–0.4)
GLUCOSE SERPL-MCNC: 99 MG/DL — SIGNIFICANT CHANGE UP (ref 70–99)
GRAM STN FLD: SIGNIFICANT CHANGE UP
HCT VFR BLD CALC: 37.1 % — SIGNIFICANT CHANGE UP (ref 37–47)
HGB BLD-MCNC: 12.3 G/DL — SIGNIFICANT CHANGE UP (ref 12–16)
MAGNESIUM SERPL-MCNC: 2.3 MG/DL — SIGNIFICANT CHANGE UP (ref 1.8–2.4)
MCHC RBC-ENTMCNC: 32.4 PG — HIGH (ref 27–31)
MCHC RBC-ENTMCNC: 33.2 G/DL — SIGNIFICANT CHANGE UP (ref 32–37)
MCV RBC AUTO: 97.6 FL — SIGNIFICANT CHANGE UP (ref 81–99)
NRBC # BLD: 0 /100 WBCS — SIGNIFICANT CHANGE UP (ref 0–0)
PLATELET # BLD AUTO: 281 K/UL — SIGNIFICANT CHANGE UP (ref 130–400)
POTASSIUM SERPL-MCNC: 4.4 MMOL/L — SIGNIFICANT CHANGE UP (ref 3.5–5)
POTASSIUM SERPL-SCNC: 4.4 MMOL/L — SIGNIFICANT CHANGE UP (ref 3.5–5)
RBC # BLD: 3.8 M/UL — LOW (ref 4.2–5.4)
RBC # FLD: 13.5 % — SIGNIFICANT CHANGE UP (ref 11.5–14.5)
SODIUM SERPL-SCNC: 139 MMOL/L — SIGNIFICANT CHANGE UP (ref 135–146)
SPECIMEN SOURCE: SIGNIFICANT CHANGE UP
WBC # BLD: 5.11 K/UL — SIGNIFICANT CHANGE UP (ref 4.8–10.8)
WBC # FLD AUTO: 5.11 K/UL — SIGNIFICANT CHANGE UP (ref 4.8–10.8)

## 2020-09-03 RX ORDER — CLOPIDOGREL BISULFATE 75 MG/1
1 TABLET, FILM COATED ORAL
Qty: 0 | Refills: 0 | DISCHARGE

## 2020-09-03 RX ORDER — ASPIRIN/CALCIUM CARB/MAGNESIUM 324 MG
1 TABLET ORAL
Qty: 0 | Refills: 0 | DISCHARGE
Start: 2020-09-03

## 2020-09-03 RX ORDER — ACETAMINOPHEN 500 MG
2 TABLET ORAL
Qty: 0 | Refills: 0 | DISCHARGE
Start: 2020-09-03

## 2020-09-03 RX ADMIN — PANTOPRAZOLE SODIUM 40 MILLIGRAM(S): 20 TABLET, DELAYED RELEASE ORAL at 06:07

## 2020-09-03 RX ADMIN — AMLODIPINE BESYLATE 5 MILLIGRAM(S): 2.5 TABLET ORAL at 05:33

## 2020-09-03 RX ADMIN — Medication 81 MILLIGRAM(S): at 11:50

## 2020-09-03 RX ADMIN — LISINOPRIL 20 MILLIGRAM(S): 2.5 TABLET ORAL at 05:33

## 2020-09-03 NOTE — DISCHARGE NOTE PROVIDER - NSDCCPCAREPLAN_GEN_ALL_CORE_FT
PRINCIPAL DISCHARGE DIAGNOSIS  Diagnosis: Hematoma of right lower extremity  Assessment and Plan of Treatment: You were found to have tenderness and skin discoloration behind your right knee. On imaging and clinical examination we found a fluid collection in that area. This is most likely a result of a benign fluid collection (cyst).  With the help of interventional radiology, a sample of that fluid was sent to the lab for analysis where it was found to be non-infectious.   You will be discharged on a 5 day antibiotics course.   Please seek medical attention if you experience these symptoms again.   Please follow up with your PCP 1 week after discharge.

## 2020-09-03 NOTE — DISCHARGE NOTE NURSING/CASE MANAGEMENT/SOCIAL WORK - PATIENT PORTAL LINK FT
You can access the FollowMyHealth Patient Portal offered by [facility] by registering at the following website: [email or web address]. By joining [employer]’s FollowMyHealth portal, you will also be able to view your health information using other applications (apps) compatible with our system.

## 2020-09-03 NOTE — DISCHARGE NOTE PROVIDER - NSDCMRMEDTOKEN_GEN_ALL_CORE_FT
acetaminophen 325 mg oral tablet: 2 tab(s) orally every 6 hours, As needed, Mild Pain (1 - 3)  ALPRAZolam 0.5 mg oral tablet:   amlodipine-benazepril 5 mg-20 mg oral capsule: 1 cap(s) orally once a day  aspirin 81 mg oral tablet, chewable: 1 tab(s) orally once a day  Carafate: 1 gram(s) orally 1 to 4 times a day  meclizine 25 mg oral tablet: 1 tab(s) orally 2 times a day   Metoprolol Succinate ER 25 mg oral tablet, extended release: 1 tab(s) orally once a day  pravastatin 10 mg oral tablet: 1 tab(s) orally once a day (at bedtime)  raNITIdine 150 mg oral capsule: 1 cap(s) orally 2 times a day acetaminophen 325 mg oral tablet: 2 tab(s) orally every 6 hours, As needed, Mild Pain (1 - 3)  ALPRAZolam 0.5 mg oral tablet:   amlodipine-benazepril 5 mg-20 mg oral capsule: 1 cap(s) orally once a day  amoxicillin-clavulanate 875 mg-125 mg oral tablet: 1 tab(s) orally 2 times a day   aspirin 81 mg oral tablet, chewable: 1 tab(s) orally once a day  Carafate: 1 gram(s) orally 1 to 4 times a day  meclizine 25 mg oral tablet: 1 tab(s) orally 2 times a day   Metoprolol Succinate ER 25 mg oral tablet, extended release: 1 tab(s) orally once a day  pravastatin 10 mg oral tablet: 1 tab(s) orally once a day (at bedtime)  raNITIdine 150 mg oral capsule: 1 cap(s) orally 2 times a day

## 2020-09-03 NOTE — CONSULT NOTE ADULT - EXTREMITIES COMMENTS
R knee : no edema/erythema. Palpation / flexion/extension no pain with full range of motion  R calf superior aspect with bluish discoloration, no pain on palpation. On deep palpation a small induration with mild tenderness

## 2020-09-03 NOTE — CONSULT NOTE ADULT - SUBJECTIVE AND OBJECTIVE BOX
JOHN DANA  80y, Female  Allergy: No Known Allergies      All historical available data reviewed.    HPI:  Patient is a 80 year old female with Medical Hx of Essential HTN, CAD s/p stents, R knee replacement (3 years ago) who presents with Right posterior leg pain. Pain intermittent and sharp initially, started on Wednesday, pain was btw 7 and 8 in intensity, no radiation, did not notice any palliative element but was provoked by movement. Patient states on about 8/15/2020 she noticed her entire posterior lower leg was purple in color, looked like a bruise but patient denied any  trauma or Anticoagulation therapy.  Patient said at the time there was no pain or swelling associated with the purple bruise, but on one week later she started to experience some pain so she went to her PMD who requested a LE doppler to r/o DVT, but test came back negative, but showed a possible ruptured cyst. However, this morning, the pain got unbearable to the   point that she couldn't walk so she decided to come to ED     In ED: vitals and Labs wnl  Trauma w/up negative   CT Right Knee: Approximately 2.5 x 2.5 x 4 cm intramuscular complex process posterior to the proximal tibia.  Differential includes hematoma, phlegmon or soft tissue neoplasm. Findings may represent a complex Baker's cyst  Moderate supra patellar joint effusion with rim enhancement, possibly postoperative in nature, however, infected fluid cannot be excluded    Pt states she takes Baby Asprin, Amlodipine and Ezetimibe, Doses Unknown    9/2 IVR :US demonstrated complex fluid collection in right lower extremity below knee with apparent communication with joint fluid. Measures greater than 6cm in greatest dimension. s/p aspiration of approximately 7cc bloody fluid with 5fr Yueh. Sample submitted to lab.     ID called for possible infected hematoma    FAMILY HISTORY:  No pertinent family history in first degree relatives    PAST MEDICAL & SURGICAL HISTORY:  Anxiety  GERD (gastroesophageal reflux disease)  CAD (coronary artery disease)  Hypertension  S/P cholecystectomy  H/O total knee replacement, right  H/O abdominal hysterectomy        VITALS:  T(F): 96.3, Max: 97.1 (09-02-20 @ 13:18)  HR: 72  BP: 127/60  RR: 18Vital Signs Last 24 Hrs  T(C): 35.7 (03 Sep 2020 05:27), Max: 36.2 (02 Sep 2020 13:18)  T(F): 96.3 (03 Sep 2020 05:27), Max: 97.1 (02 Sep 2020 13:18)  HR: 72 (03 Sep 2020 05:27) (72 - 88)  BP: 127/60 (03 Sep 2020 05:27) (105/52 - 127/60)  BP(mean): --  RR: 18 (03 Sep 2020 05:27) (18 - 18)  SpO2: --    TESTS & MEASUREMENTS:                        12.3   5.05  )-----------( 273      ( 02 Sep 2020 06:05 )             38.4     09-02    141  |  106  |  20  ----------------------------<  96  4.7   |  26  |  0.8    Ca    9.8      02 Sep 2020 06:05  Mg     2.2     09-02          Culture - Body Fluid with Gram Stain (collected 09-02-20 @ 10:37)  Source: .Body Fluid Knee Fluid  Gram Stain (09-03-20 @ 01:57):    polymorphonuclear leukocytes seen    No organisms seen    by cytocentrifuge            RADIOLOGY & ADDITIONAL TESTS:  Personal review of radiological diagnostics performed  Echo and EKG results noted when applicable.     MEDICATIONS:  acetaminophen   Tablet .. 650 milliGRAM(s) Oral every 6 hours PRN  amLODIPine   Tablet 5 milliGRAM(s) Oral daily  aspirin  chewable 81 milliGRAM(s) Oral daily  chlorhexidine 4% Liquid 1 Application(s) Topical <User Schedule>  lisinopril 20 milliGRAM(s) Oral daily  oxyCODONE    IR 5 milliGRAM(s) Oral every 6 hours PRN  pantoprazole    Tablet 40 milliGRAM(s) Oral before breakfast  senna 2 Tablet(s) Oral at bedtime      ANTIBIOTICS:
INTERVENTIONAL RADIOLOGY CONSULT:     Procedure Requested: Aspiration, right leg fluid collection    HPI:  Patient is a 80 year old female with Medical Hx of Essential HTN, CAD s/p stents, R knee replacement (3 years ago) who presents with Right posterior leg pain. Pain intermittent and sharp initially, started on Wednesday, pain was btw 7 and 8 in intensity, no radiation, did not notice any palliative element but was provoked by movement. Patient states on about 8/15/2020 she noticed her entire posterior lower leg was purple in color, looked like a bruise but patient denied any  trauma or   Anticoagulation therapy.  Patient said at the time there was no pain or swelling associated with the purple bruise, but on one week later she started to experience some pain so she went to her PMD who requested a LE doppler to r/o DVT, but test came back negative, but showed a possible ruptured cyst. However, this morning, the pain got unbearable to the   point that she couldn't walk so she decided to come to ED     In ED: vitals and Labs wnl  Trauma w/up negative   CT Right Knee: Approximately 2.5 x 2.5 x 4 cm intramuscular complex process posterior to the proximal tibia.  Differential includes hematoma, phlegmon or soft tissue neoplasm. Findings may represent a complex Baker's cyst  Moderate supra patellar joint effusion with rim enhancement, possibly postoperative in nature, however, infected fluid cannot be excluded    Pt states she takes Baby Asprin, Amlodipine and Ezetimibe, Doses Unknown  Pls complete Med rec in AM: [facility] : [facility] (30 Aug 2020 22:05)      PAST MEDICAL & SURGICAL HISTORY:  Anxiety  GERD (gastroesophageal reflux disease)  CAD (coronary artery disease)  Hypertension  S/P cholecystectomy  H/O total knee replacement, right  H/O abdominal hysterectomy      MEDICATIONS  (STANDING):  amLODIPine   Tablet 5 milliGRAM(s) Oral daily  aspirin  chewable 81 milliGRAM(s) Oral daily  chlorhexidine 4% Liquid 1 Application(s) Topical <User Schedule>  enoxaparin Injectable 40 milliGRAM(s) SubCutaneous at bedtime  lisinopril 20 milliGRAM(s) Oral daily  pantoprazole    Tablet 40 milliGRAM(s) Oral before breakfast    MEDICATIONS  (PRN):  acetaminophen   Tablet .. 650 milliGRAM(s) Oral every 6 hours PRN Mild Pain (1 - 3)  oxyCODONE    IR 5 milliGRAM(s) Oral every 6 hours PRN Severe Pain (7 - 10)      Allergies    No Known Allergies    Intolerances    Vital Signs Last 24 Hrs  T(C): 35.6 (01 Sep 2020 14:04), Max: 35.8 (01 Sep 2020 04:49)  T(F): 96 (01 Sep 2020 14:04), Max: 96.5 (01 Sep 2020 04:49)  HR: 65 (01 Sep 2020 14:04) (63 - 68)  BP: 101/61 (01 Sep 2020 14:04) (101/61 - 117/73)  BP(mean): --  RR: 18 (01 Sep 2020 14:04) (18 - 18)  SpO2: 97% (31 Aug 2020 20:20) (97% - 97%)    Labs:                         12.4   5.08  )-----------( 265      ( 01 Sep 2020 06:07 )             37.5     09-01    140  |  105  |  18  ----------------------------<  94  4.7   |  26  |  0.8    Ca    9.6      01 Sep 2020 06:07  Mg     2.2     09-01          Pertinent labs:                      12.4   5.08  )-----------( 265      ( 01 Sep 2020 06:07 )             37.5       09-01    140  |  105  |  18  ----------------------------<  94  4.7   |  26  |  0.8    Ca    9.6      01 Sep 2020 06:07  Mg     2.2     09-01            Radiology & Additional Studies:     Radiology imaging reviewed.       ASSESSMENT/ PLAN:     80F with right leg pain, found to have multi-septated fluid collection regional to the right medial gastrocnemius muscle/contiguous with a popliteal cyst. Will plan for image-guided aspiration 9/2/2020. Please hold any anticoagulation or antiplatelet medications on 9/2 until after procedure.     Thank you for the courtesy of this consult, please call [phone] with any further questions.
Patient is a 80 year old female with pmh of htn and CAD (s/p stents 2007-on ASA only) and R knee replacement (3 years ago) who presents with Right posterior leg pain. Patient states on about 8/15/2020 she noticed her entire posterior lower leg was  a purple bruise. Patient denies any trauma/falls that would cause such a large bruise. Patient said at the time there was no pain or swelling associated with the purple bruise.  She called her PMD who sent her for an US to rule out a DVT on Wed 8/26/2020. She states US was negative for a DVT. About a day later that's when she the pain on the upper calf started. Patient states the pain has gotten gradually worse, to the point where it is difficult to walk. Patient denies any fever, SOB, Chest pain, n/v/d, numbness or tingling.     In ED patient had BLE Duplex and CT RLE. Results below.     Allergies    No Known Allergies    Intolerances      PAST MEDICAL & SURGICAL HISTORY:  Anxiety  GERD (gastroesophageal reflux disease)  CAD (coronary artery disease)  Hypertension  S/P cholecystectomy  H/O total knee replacement, right  H/O abdominal hysterectomy    < from: CT Lower Extremity w/ IV Cont, Right (08.30.20 @ 13:00) >  IMPRESSION:    Approximately 2.5 x 2.5 x 4 cm intramuscular complex process posterior to the proximal tibia.    Differential includes hematoma, phlegmon or soft tissue neoplasm. Findings may represent a complex Baker's cyst    Further evaluation with a postcontrast MRI is recommended for further characterization    Moderate supra patellar joint effusion with rim enhancement, possibly postoperative in nature, however, infected fluid cannot be excluded    Multiple areas of narrowing within the anterior tibial artery, incompletely evaluated on this examination. If indicated, a CT angiogram of the right lower extremity can be performed on an outpatient basis.    < end of copied text >    < from: [facility] Duplex Lower Ext Vein Scan, Bilat (08.30.20 @ 09:05) >  No evidence of deep venous thrombosis or superficial thrombophlebitis in the bilateral lower extremities.  Right knee nonspecific fluid collection 1.6 x 1.9 x 2.2 cm  Left popliteal fossa Baker's cyst 1.1 x 1.8 x 3.1 cm    < end of copied text >                            12.7   5.57  )-----------( 260      ( 30 Aug 2020 09:50 )             38.7       Exam:  General: Patient sitting comfortably in bed, in no acute distress  Resp: Breathing comfortably on room air, bilateral equal chest rise and fall   Neuro: awake, alert and oriented, speaking in full sentences  MSK:   Right lower leg: mildly swollen, dark purple ecchymosis along entire calf, no open wounds, no bleeding, no pus, no erythema, normal ROM, sensation intact, 2+ DP pulse, very tender to palpation upper calf area right below popliteal fossa more towards medial aspect of leg, no induration or collection palpated, no crepitus   Left lower leg: normal ROM, 2+ DP Pulse, no tenderness, no erythema, no swelling, no bruising no wounds

## 2020-09-03 NOTE — PROGRESS NOTE ADULT - SUBJECTIVE AND OBJECTIVE BOX
ORTHO PROGRESS NOTE    DANA LOPEZ  [id number]    Patient seen and examined bedside. Pain better controlled on current regimen. POD 1 s/p IR aspiration of RLE collection. Denies fever/chills/CP/SOB    Vitals:  T(C): 35.7 (09-03-20 @ 05:27), Max: 36.2 (09-02-20 @ 13:18)  HR: 72 (09-03-20 @ 05:27) (72 - 88)  BP: 127/60 (09-03-20 @ 05:27) (105/52 - 127/60)  RR: 18 (09-03-20 @ 05:27) (18 - 18)  SpO2: 95% (09-03-20 @ 07:56) (95% - 95%)    PE:  NAD  Unlabored resp on RA  Resting comfortably    RLE:  Aspiration site c/d/i  Ecchymosis ml mal, posterior calf  Compartments soft, compressible  SILT sp/dp/s/s/t  Motor intact ehl/fhl/ta/gs  DP palpable  wwp, bcr    Labs:                        12.3   5.11  )-----------( 281      ( 03 Sep 2020 06:27 )             37.1     09-03    139  |  102  |  19  ----------------------------<  99  4.4   |  25  |  0.8    Ca    9.5      03 Sep 2020 06:27  Mg     2.3     09-03        PT/INR - ( 01 Sep 2020 22:15 )   PT: 12.40 sec;   INR: 1.08 ratio         PTT - ( 01 Sep 2020 22:15 )  PTT:30.4 sec    Aspiration:   white cell count 370  RBC 1,232,000  gram stain neg     A/P: 80 year old Female s/p R TKA at OSH w/ right leg pain secondary to soft tissue collection. Now s/p IR aspiration - 7cc bloody fluid  - No acute orthopedic internvention at this time. Aspiration bloody with no current signs of infection  - will f/u final cultures  - Pain control  - WBAT RLE  - DVT ppx: SCD, chem ppx  - IS  - Rehab/PT  - Dispo: Pending

## 2020-09-03 NOTE — CONSULT NOTE ADULT - ASSESSMENT
ASSESSMENT:  Right posterior leg pain with intramuscular complex process posterior to proximal tibia seen on CT scan and US, multiple differential diagnosis   Possible ddx include Infected Hematoma vs Abscess vs bakers cyst     RECOMMENDATION:  Wound care - no wound care right now as there is there no wound   Consult Orthopedics   Consider post contrast MRI for further evaluation as recommended by radiologist   Consider Arterial duplex as CT noted some narrowing anterior tibial artery  Consult IR - for possible drainage as this collection is complex and intramuscular  IV abx as indicated/ per ID  Discussed with Dr. Clarke   Will follow.
80 year old female with PMHx of HTN, CAD s/p stenting, Right Knee replacement 3 years ago who presents with right leg posterior calf pain and ecchymosis.     IMPRESSION;  Complex right knee Bakers cyst with rupture with intramuscular hematoma right gastrocnemius with no abscess/cellulitis  IVR aspirate 9/1 with 7ccs of bloody fluid with gm stain no PMNs and RBC [id number] and  with 30P, 60L. This is not suggestive of an infectious process  No evidence of infected right total knee prosthesis ( clinically, on PE and radiographically )  WBC 5.0    RECOMMENDATIONS;  po Augmentin 875 mg q12h for 5 days

## 2020-09-03 NOTE — PROGRESS NOTE ADULT - SUBJECTIVE AND OBJECTIVE BOX
JOHNDANA  80y  Female  HPI:  Patient is a 80 year old female with Medical Hx of Essential HTN, CAD s/p stents, R knee replacement (3 years ago) who presents with Right posterior leg pain. Pain intermittent and sharp initially, started on Wednesday, pain was btw 7 and 8 in intensity, no radiation, did not notice any palliative element but was provoked by movement. Patient states on about 8/15/2020 she noticed her entire posterior lower leg was purple in color, looked like a bruise but patient denied any  trauma or   Anticoagulation therapy.  Patient said at the time there was no pain or swelling associated with the purple bruise, but on one week later she started to experience some pain so she went to her PMD who requested a LE doppler to r/o DVT, but test came back negative, but showed a possible ruptured cyst. However, this morning, the pain got unbearable to the   point that she couldn't walk so she decided to come to ED     In ED: vitals and Labs wnl  Trauma w/up negative   CT Right Knee: Approximately 2.5 x 2.5 x 4 cm intramuscular complex process posterior to the proximal tibia.  Differential includes hematoma, phlegmon or soft tissue neoplasm. Findings may represent a complex Baker's cyst  Moderate supra patellar joint effusion with rim enhancement, possibly postoperative in nature, however, infected fluid cannot be excluded    Pt states she takes Baby Asprin, Amlodipine and Ezetimibe, Doses Unknown  Pls complete Med rec in AM: Pharmacy : [facility] (30 Aug 2020 22:05)    MEDICATIONS  (STANDING):  amLODIPine   Tablet 5 milliGRAM(s) Oral daily  aspirin  chewable 81 milliGRAM(s) Oral daily  chlorhexidine 4% Liquid 1 Application(s) Topical <User Schedule>  lisinopril 20 milliGRAM(s) Oral daily  pantoprazole    Tablet 40 milliGRAM(s) Oral before breakfast  senna 2 Tablet(s) Oral at bedtime    MEDICATIONS  (PRN):  acetaminophen   Tablet .. 650 milliGRAM(s) Oral every 6 hours PRN Mild Pain (1 - 3)  oxyCODONE    IR 5 milliGRAM(s) Oral every 6 hours PRN Severe Pain (7 - 10)    INTERVAL EVENTS: Patient seen today, chart reviewed. Patient feels better, leg is not as sensitive to touch as it was before.    T(C): 35.7 (09-03-20 @ 05:27), Max: 36.2 (09-02-20 @ 13:18)  HR: 72 (09-03-20 @ 05:27) (72 - 88)  BP: 127/60 (09-03-20 @ 05:27) (105/52 - 127/60)  RR: 18 (09-03-20 @ 05:27) (18 - 18)  SpO2: 95% (09-03-20 @ 07:56) (95% - 95%)  Wt(kg): --Vital Signs Last 24 Hrs  T(C): 35.7 (03 Sep 2020 05:27), Max: 36.2 (02 Sep 2020 13:18)  T(F): 96.3 (03 Sep 2020 05:27), Max: 97.1 (02 Sep 2020 13:18)  HR: 72 (03 Sep 2020 05:27) (72 - 88)  BP: 127/60 (03 Sep 2020 05:27) (105/52 - 127/60)  BP(mean): --  RR: 18 (03 Sep 2020 05:27) (18 - 18)  SpO2: 95% (03 Sep 2020 07:56) (95% - 95%)    PHYSICAL EXAM:  GENERAL: NAD  NECK: Supple, No JVD  CHEST/LUNG: Clear  HEART: S1, S2, Regular rate and rhythm  ABDOMEN: Soft, Nontender, Bowel sounds present  EXTREMITIES: Decreased edema, bruising , and tenderness  SKIN: No rashes or lesions    LABS:             12.3   5.11  )-----------( 281      ( 03 Sep 2020 06:27 )             37.1             09-03    139  |  102  |  19  ----------------------------<  99  4.4   |  25  |  0.8    Ca    9.5      03 Sep 2020 06:27  Mg     2.3     09-03            PT/INR - ( 01 Sep 2020 22:15 )   PT: 12.40 sec;   INR: 1.08 ratio         PTT - ( 01 Sep 2020 22:15 )  PTT:30.4 sec    Sedimentation Rate, Erythrocyte (09.02.20 @ 00:46)    Sedimentation Rate, Erythrocyte: 10 mm/Hr        Culture - Body Fluid with Gram Stain (collected 02 Sep 2020 10:37)  Source: .Body Fluid Knee Fluid  Gram Stain (03 Sep 2020 01:57):    polymorphonuclear leukocytes seen    No organisms seen    by cytocentrifuge      RADIOLOGY & ADDITIONAL TESTS:  < from: US Guided Needle Placement SI (09.02.20 @ 10:56) >  INTERPRETATION:  INDICATION AND FOCUSED HISTORY: Right lower extremity fluid collection..    PROCEDURE: Ultrasound-guided aspiration of right lower extremity fluid collection.    DATE OF PROCEDURE: 9/2/2020 10:56 AM.    PROCEDURAL PERSONNEL:  Attending Physician: ELLIOT LANDAU, MD  Resident: SANDRO CHACKO MD    ANESTHESIA: 1% local lidocaine    CONTRAST: None.    ESTIMATED BLOOD LOSS: Minimal, less than 5 ml.    CONDITION: Stable.    COMPLICATIONS: None immediate.    IMAGING MODALITY AND ARCHIVING: Ultrasound images were saved and sent to PACS immediately upon completion of the procedure.    FINDINGS:  Initial ultrasound demonstrated complex fluid collection inthe right lower extremity below knee with apparent communication with joint fluid, measuring up to 6.4 cm in greatest dimension. Multiple additional components noted of this collection.    Successful ultrasound-guided aspiration of right lower extremity collection.    PROCEDURE DESCRIPTION:  The patients chart and imaging were reviewed and a focused history and physical performed.  The nature, purpose, risks and alternatives of the procedure were explained to the patient, and informed consent wasreceived and documented. The patient was brought to the procedure suite and placed  prone  on the table.  A pre-procedure team time-out was performed.    The patient's posterior right lower extremity was prepped and draped in standard sterile fashion(maximum show barrier technique). The fluid collection was identified with US. Skin was infiltrated with 1% lidocaine for local anesthesia. Utilizing US guidance a 5 [place] citizen Yueh was advanced into the fluid collection, and approximately 7 cc of bloodyfluid aspirated. All needles were removed from the patient.    A sterile dressing was applied. The patient tolerated the procedure well without immediate complication.    SPECIMEN: Microbiology    < end of copied text >

## 2020-09-03 NOTE — PROGRESS NOTE ADULT - REASON FOR ADMISSION
Right Leg Pain

## 2020-09-03 NOTE — PROGRESS NOTE ADULT - PROVIDER SPECIALTY LIST ADULT
Burn
Internal Medicine
Intervent Radiology
Orthopedics
Internal Medicine
Internal Medicine

## 2020-09-03 NOTE — PROGRESS NOTE ADULT - NUTRITIONAL ASSESSMENT
Assessment and Plan:   80 year old female with PMHx of HTN, CAD s/p stenting, Right Knee replacement 3 years ago who presents with right leg posterior calf pain and ecchymosis.       RLE posterior pain and ecchymosis   - DDx: Infected popliteal cyst/Abscess vs. Hematoma  - gradual progressive nature of pain  - case discussed with ortho, Dr Miller  - IR consulted for biopsy/culture  - case discussed with Dr Lopez, films reviewed, will give definitive answer regarding how to proceed later today    HTN  - continue on Amlodipine and Lisinopril while inpatient     HLD  - Zetia 10mg at home  - Patient advised to have her medication brought to the hospital to complete non-formulary process    GERD  - Protonix    hx CAD s/p PCI  - Continue on ASA     Activity: As tolerated  Diet: DASH  DVT ppx: Lovenox  GI ppx: Protonix
Assessment and Plan:   80 year old female with PMHx of HTN, CAD s/p stenting, Right Knee replacement 3 years ago who presents with right leg posterior calf pain and ecchymosis.       RLE posterior pain and ecchymosis  = Ruptured Baker's Cyst  - pain, bruising and swelling decreased  - IR procedure completed, culture no organism seen  - ID recommendations noted : PO Augmentin for 5 days  - PT eval today, evaluate needs for discharge    HTN  - continue on Amlodipine and Lisinopril while inpatient     HLD  - Zetia 10mg at home  - Patient advised to have her medication brought to the hospital to complete non-formulary process    GERD  - Protonix    hx CAD s/p PCI  - Continue on ASA     Activity: As tolerated  Diet: DASH  DVT ppx: Lovenox  GI ppx: Protonix    Discharge home today, F/U in office next week

## 2020-09-03 NOTE — DISCHARGE NOTE PROVIDER - HOSPITAL COURSE
Patient is a 80 year old female with Medical Hx of Essential HTN, CAD s/p stents, R knee replacement (3 years ago) who presents with Right posterior leg pain. Pain intermittent and sharp initially, started on Wednesday, pain was btw 7 and 8 in intensity, no radiation, did not notice any palliative element but was provoked by movement. Patient states on about 8/15/2020 she noticed her entire posterior lower leg was purple in color, looked like a bruise but patient denied any  trauma.     Pt is not on Anticoagulation therapy.  Patient said at the time there was no pain or swelling associated with the purple bruise, but on one week later she started to experience some pain so she went to her PMD who requested a LE doppler to r/o DVT, but test came back negative, but showed a possible ruptured cyst. However, this morning, the pain got unbearable to the     point that she couldn't walk so she decided to come to ED         In ED: vitals and Labs wnl    Trauma w/up negative     CT Right Knee: Approximately 2.5 x 2.5 x 4 cm intramuscular complex process posterior to the proximal tibia.    Differential includes hematoma, phlegmon or soft tissue neoplasm. Findings may represent a complex Baker's cyst.    MR Lower Ext Non-joint w/ IV Cont, Right (08.31.20)    Multiseptated complex fluid collection measuring 3.5 x 4.1 x 6.8 cm in the medial gastrocnemius muscle contiguous with the popliteal cyst, infected popliteal cyst/abscess versus hematoma. Recommend aspiration/tissue sampling. Right total knee arthroplasty, without definite MRI evidence of osteolysis.        IR aspirated the cyst on 09/02/2020, 7cc of bloody aspirate was obtained (weren't able to fully asirate because it was very thick). Sample was sent for analysis.         ID's IMPRESSION;    Complex right knee Bakers cyst with rupture with intramuscular hematoma right gastrocnemius with no abscess/cellulitis    IVR aspirate 9/1 with 7ccs of bloody fluid with gm stain no PMNs and RBC [id number] and  with 30P, 60L. This is not suggestive of an infectious process    No evidence of infected right total knee prosthesis ( clinically, on PE and radiographically ) WBC 5.0.    ID recommendation: po Augmentin 875 mg q12h for 5 days.        Patient is stable to be discharged today.

## 2020-09-08 DIAGNOSIS — E78.5 HYPERLIPIDEMIA, UNSPECIFIED: ICD-10-CM

## 2020-09-08 DIAGNOSIS — Z95.5 PRESENCE OF CORONARY ANGIOPLASTY IMPLANT AND GRAFT: ICD-10-CM

## 2020-09-08 DIAGNOSIS — I10 ESSENTIAL (PRIMARY) HYPERTENSION: ICD-10-CM

## 2020-09-08 DIAGNOSIS — K21.9 GASTRO-ESOPHAGEAL REFLUX DISEASE WITHOUT ESOPHAGITIS: ICD-10-CM

## 2020-09-08 DIAGNOSIS — I25.10 ATHEROSCLEROTIC HEART DISEASE OF NATIVE CORONARY ARTERY WITHOUT ANGINA PECTORIS: ICD-10-CM

## 2020-09-08 DIAGNOSIS — Z96.651 PRESENCE OF RIGHT ARTIFICIAL KNEE JOINT: ICD-10-CM

## 2020-09-08 DIAGNOSIS — M79.661 PAIN IN RIGHT LOWER LEG: ICD-10-CM

## 2020-09-08 DIAGNOSIS — M79.81 NONTRAUMATIC HEMATOMA OF SOFT TISSUE: ICD-10-CM

## 2020-09-08 DIAGNOSIS — F41.9 ANXIETY DISORDER, UNSPECIFIED: ICD-10-CM

## 2020-09-08 DIAGNOSIS — M66.0 RUPTURE OF POPLITEAL CYST: ICD-10-CM

## 2020-09-16 LAB
CULTURE RESULTS: SIGNIFICANT CHANGE UP
SPECIMEN SOURCE: SIGNIFICANT CHANGE UP

## 2020-10-23 ENCOUNTER — APPOINTMENT (OUTPATIENT)
Dept: CARDIOLOGY | Facility: CLINIC | Age: 80
End: 2020-10-23
Payer: MEDICARE

## 2020-10-23 VITALS
DIASTOLIC BLOOD PRESSURE: 72 MMHG | WEIGHT: 155 LBS | HEART RATE: 79 BPM | BODY MASS INDEX: 26.46 KG/M2 | TEMPERATURE: 97.3 F | HEIGHT: 64 IN | SYSTOLIC BLOOD PRESSURE: 148 MMHG

## 2020-10-23 PROCEDURE — 93000 ELECTROCARDIOGRAM COMPLETE: CPT

## 2020-10-23 PROCEDURE — 99214 OFFICE O/P EST MOD 30 MIN: CPT

## 2020-10-23 PROCEDURE — 99072 ADDL SUPL MATRL&STAF TM PHE: CPT

## 2020-10-25 RX ORDER — EZETIMIBE 10 MG/1
10 TABLET ORAL
Refills: 0 | Status: DISCONTINUED | COMMUNITY
End: 2020-10-25

## 2020-10-25 NOTE — ASSESSMENT
[FreeTextEntry1] : CAD s/p PCI (2007 / 2011).\par No clear angina.  nL LVSF.\par \par Mild to Mod AS / AI.\par ECHO (6/11/20): nL LVSF.  Mild to Mos AI / AS.  Mild TR.\par \par BP mildly elevated (repeat 142/70).

## 2020-10-25 NOTE — HISTORY OF PRESENT ILLNESS
[FreeTextEntry1] : 79 year-old female presents to [facility] care.\par Previously followed with Dr. Alford.\par \par Cardiac history:\par CAD s/p PCI LAD / CX (2007).\par Repeat PCI 2011\par Murmur\par \par Worsening exertional dyspnea / fatigue x several months.  Difficulty with steps.  Breathing comfortable at rest.\par \par Vague chest discomfort.  Somewhat reminiscent of prior angina, but less intense.  Usually exertional, but occasionally at rest.\par \par Nocturnal palpitations in bed.\par \par Mild brief lightheadedness.  No syncope.\par \par Notably, admitted baseline anxiety is worse secondary [place].

## 2020-10-25 NOTE — DISCUSSION/SUMMARY
[FreeTextEntry1] : Cont ASA.\par Stop Zetia.  Start Lipitor.\par Cont Amlodipine-Olmesartan.  Monitor BP.\par Follow-up 6-months.

## 2020-10-25 NOTE — REASON FOR VISIT
[Follow-Up - Clinic] : a clinic follow-up of [Coronary Artery Disease] : coronary artery disease [FreeTextEntry1] : Feels well.\par \par Remains anxious about moving.\par \par No chest pain.  Stable mild exertional dyspnea.  Breathing otherwise comfortable.\par \par No lightheadedness / syncope.\par \par Tolerating Rx.

## 2021-02-21 ENCOUNTER — OUTPATIENT (OUTPATIENT)
Dept: OUTPATIENT SERVICES | Facility: HOSPITAL | Age: 81
LOS: 1 days | Discharge: HOME | End: 2021-02-21

## 2021-02-21 DIAGNOSIS — Z96.651 PRESENCE OF RIGHT ARTIFICIAL KNEE JOINT: Chronic | ICD-10-CM

## 2021-02-21 DIAGNOSIS — Z90.710 ACQUIRED ABSENCE OF BOTH CERVIX AND UTERUS: Chronic | ICD-10-CM

## 2021-02-21 DIAGNOSIS — Z11.59 ENCOUNTER FOR SCREENING FOR OTHER VIRAL DISEASES: ICD-10-CM

## 2021-02-21 DIAGNOSIS — Z90.49 ACQUIRED ABSENCE OF OTHER SPECIFIED PARTS OF DIGESTIVE TRACT: Chronic | ICD-10-CM

## 2021-02-24 ENCOUNTER — OUTPATIENT (OUTPATIENT)
Dept: OUTPATIENT SERVICES | Facility: HOSPITAL | Age: 81
LOS: 1 days | Discharge: HOME | End: 2021-02-24
Payer: MEDICARE

## 2021-02-24 DIAGNOSIS — Z96.651 PRESENCE OF RIGHT ARTIFICIAL KNEE JOINT: Chronic | ICD-10-CM

## 2021-02-24 DIAGNOSIS — R10.9 UNSPECIFIED ABDOMINAL PAIN: ICD-10-CM

## 2021-02-24 DIAGNOSIS — R11.10 VOMITING, UNSPECIFIED: ICD-10-CM

## 2021-02-24 DIAGNOSIS — Z90.710 ACQUIRED ABSENCE OF BOTH CERVIX AND UTERUS: Chronic | ICD-10-CM

## 2021-02-24 DIAGNOSIS — Z90.49 ACQUIRED ABSENCE OF OTHER SPECIFIED PARTS OF DIGESTIVE TRACT: Chronic | ICD-10-CM

## 2021-02-24 PROCEDURE — 78264 GASTRIC EMPTYING IMG STUDY: CPT | Mod: 26

## 2021-04-09 ENCOUNTER — APPOINTMENT (OUTPATIENT)
Dept: CARDIOLOGY | Facility: CLINIC | Age: 81
End: 2021-04-09

## 2021-04-19 ENCOUNTER — APPOINTMENT (OUTPATIENT)
Dept: CARDIOLOGY | Facility: CLINIC | Age: 81
End: 2021-04-19
Payer: MEDICARE

## 2021-04-19 PROCEDURE — 93306 TTE W/DOPPLER COMPLETE: CPT

## 2021-04-19 PROCEDURE — 99072 ADDL SUPL MATRL&STAF TM PHE: CPT

## 2021-04-29 ENCOUNTER — APPOINTMENT (OUTPATIENT)
Dept: CARDIOLOGY | Facility: CLINIC | Age: 81
End: 2021-04-29
Payer: MEDICARE

## 2021-04-29 ENCOUNTER — RESULT CHARGE (OUTPATIENT)
Age: 81
End: 2021-04-29

## 2021-04-29 VITALS
TEMPERATURE: 98.4 F | HEIGHT: 64 IN | BODY MASS INDEX: 26.63 KG/M2 | DIASTOLIC BLOOD PRESSURE: 72 MMHG | WEIGHT: 156 LBS | HEART RATE: 73 BPM | SYSTOLIC BLOOD PRESSURE: 128 MMHG

## 2021-04-29 PROCEDURE — 93000 ELECTROCARDIOGRAM COMPLETE: CPT

## 2021-04-29 PROCEDURE — 99214 OFFICE O/P EST MOD 30 MIN: CPT

## 2021-04-29 PROCEDURE — 99072 ADDL SUPL MATRL&STAF TM PHE: CPT

## 2021-04-29 NOTE — REASON FOR VISIT
[Follow-Up - Clinic] : a clinic follow-up of [Coronary Artery Disease] : coronary artery disease [FreeTextEntry1] : Feels well.\par \par No chest pain.\par \par Persistent exertional dyspnea (cleaning house).  Breathing otherwise comfortable.\par \par No chest pain.\par \par No lightheadedness / syncope.\par \par Tolerating Rx.\par \par Did not start Lipitor (misunderstanding).  Stopped Zetia.\par ECHO (4/19/21): nL LVSF.  Mild MR.  Mod AI.  Mod AS.

## 2021-04-29 NOTE — DISCUSSION/SUMMARY
[FreeTextEntry1] : Cont ASA.\par Start Lipitor.\par Cont Amlodipine-Benazepril.\par Pharm NST to evaluate for anginal equivalent.\par Follow-up 6-months.

## 2021-04-29 NOTE — HISTORY OF PRESENT ILLNESS
[FreeTextEntry1] : 79 year-old female presents to [facility] care.\par Previously followed with Dr. Alford.\par \par Cardiac history:\par CAD s/p PCI LAD / CX (2007).\par Repeat PCI 2011\par Murmur\par \par Worsening exertional dyspnea / fatigue x several months.  Difficulty with steps.  Breathing comfortable at rest.\par \par Vague chest discomfort.  Somewhat reminiscent of prior angina, but less intense.  Usually exertional, but occasionally at rest.\par \par Nocturnal palpitations in bed.\par \par Mild brief lightheadedness.  No syncope.\par \par Notably, admitted baseline anxiety is worse secondary to the [place].

## 2021-04-29 NOTE — ASSESSMENT
[FreeTextEntry1] : CAD s/p PCI (2007 / 2011).\par No clear angina.  nL LVSF.\par \par Mod AS. Mod AI.\par \par BP controlled.\par \par ECHO (4/19/21): nL LVSF.  Mild MR.  Mod AI.  Mod AS.

## 2021-08-05 ENCOUNTER — APPOINTMENT (OUTPATIENT)
Dept: CARDIOLOGY | Facility: CLINIC | Age: 81
End: 2021-08-05
Payer: MEDICARE

## 2021-08-05 VITALS
HEIGHT: 64 IN | TEMPERATURE: 97.9 F | DIASTOLIC BLOOD PRESSURE: 70 MMHG | WEIGHT: 157 LBS | BODY MASS INDEX: 26.8 KG/M2 | SYSTOLIC BLOOD PRESSURE: 122 MMHG

## 2021-08-05 VITALS — TEMPERATURE: 97.9 F | DIASTOLIC BLOOD PRESSURE: 70 MMHG | SYSTOLIC BLOOD PRESSURE: 122 MMHG | HEART RATE: 75 BPM

## 2021-08-05 VITALS — BODY MASS INDEX: 26.95 KG/M2 | WEIGHT: 157 LBS

## 2021-08-05 PROCEDURE — 99214 OFFICE O/P EST MOD 30 MIN: CPT

## 2021-08-05 PROCEDURE — 93000 ELECTROCARDIOGRAM COMPLETE: CPT

## 2021-08-05 RX ORDER — OMEPRAZOLE 40 MG/1
40 CAPSULE, DELAYED RELEASE ORAL TWICE DAILY
Refills: 0 | Status: DISCONTINUED | COMMUNITY
End: 2021-08-05

## 2021-08-05 RX ORDER — ATORVASTATIN CALCIUM 20 MG/1
20 TABLET, FILM COATED ORAL
Qty: 90 | Refills: 1 | Status: DISCONTINUED | COMMUNITY
Start: 2020-10-23 | End: 2021-08-05

## 2021-08-06 NOTE — HISTORY OF PRESENT ILLNESS
[FreeTextEntry1] : 79 year-old female presents to [facility] care.\par Previously followed with Dr. Alford.\par \par Cardiac history:\par CAD s/p PCI LAD / CX (2007).\par Repeat PCI 2011\par Murmur\par \par Worsening exertional dyspnea / fatigue x several months.  Difficulty with steps.  Breathing comfortable at rest.\par \par Vague chest discomfort.  Somewhat reminiscent of prior angina, but less intense.  Usually exertional, but occasionally at rest.\par \par Nocturnal palpitations in bed.\par \par Mild brief lightheadedness.  No syncope.\par \par Notably, admitted baseline anxiety is worse secondary to the [place] of [place].

## 2021-08-06 NOTE — REASON FOR VISIT
[Follow-Up - Clinic] : a clinic follow-up of [Coronary Artery Disease] : coronary artery disease [FreeTextEntry1] : Persistent exertional dyspnea (cleaning house).  Variably associated with chest discomfort and brief palpitation.\par \par No rest symptoms.\par \par No lightheadedness / syncope.\par \par Tolerating Rx.\par \par Muscle aches on Lipitor.  Stopped.\par \par Did not get stress test.\par \par Anxious, but symptoms clearly have exertional component.

## 2021-08-06 NOTE — ASSESSMENT
[FreeTextEntry1] : CAD s/p PCI (2007 / 2011).\par Possible angina.\par \par Mod AS. Mod AI.\par \par BP controlled.\par \par Statin intolerance.\par \par ECHO (4/19/21): nL LVSF.  Mild MR.  Mod AI.  Mod AS.

## 2021-08-06 NOTE — DISCUSSION/SUMMARY
[FreeTextEntry1] : Cont ASA.\par Cont Amlodipine-Benazepril.\par Pharm NST (auth number provided).\par Will readdress statin after stress test (anxious).\par Follow-up 3-months (pending stress test).

## 2021-09-13 ENCOUNTER — OUTPATIENT (OUTPATIENT)
Dept: OUTPATIENT SERVICES | Facility: HOSPITAL | Age: 81
LOS: 1 days | Discharge: HOME | End: 2021-09-13
Payer: MEDICARE

## 2021-09-13 DIAGNOSIS — Z90.49 ACQUIRED ABSENCE OF OTHER SPECIFIED PARTS OF DIGESTIVE TRACT: Chronic | ICD-10-CM

## 2021-09-13 DIAGNOSIS — Z96.651 PRESENCE OF RIGHT ARTIFICIAL KNEE JOINT: Chronic | ICD-10-CM

## 2021-09-13 DIAGNOSIS — Z90.710 ACQUIRED ABSENCE OF BOTH CERVIX AND UTERUS: Chronic | ICD-10-CM

## 2021-09-13 DIAGNOSIS — I25.10 ATHEROSCLEROTIC HEART DISEASE OF NATIVE CORONARY ARTERY WITHOUT ANGINA PECTORIS: ICD-10-CM

## 2021-09-13 PROCEDURE — 78452 HT MUSCLE IMAGE SPECT MULT: CPT | Mod: 26

## 2021-11-18 ENCOUNTER — APPOINTMENT (OUTPATIENT)
Dept: CARDIOLOGY | Facility: CLINIC | Age: 81
End: 2021-11-18
Payer: MEDICARE

## 2021-11-18 VITALS
BODY MASS INDEX: 27.14 KG/M2 | HEART RATE: 60 BPM | DIASTOLIC BLOOD PRESSURE: 66 MMHG | SYSTOLIC BLOOD PRESSURE: 130 MMHG | TEMPERATURE: 98.3 F | WEIGHT: 159 LBS | HEIGHT: 64 IN

## 2021-11-18 PROCEDURE — 93000 ELECTROCARDIOGRAM COMPLETE: CPT

## 2021-11-18 PROCEDURE — 99214 OFFICE O/P EST MOD 30 MIN: CPT

## 2021-11-18 NOTE — ASSESSMENT
[FreeTextEntry1] : CAD s/p PCI (2007 / 2011).\par Symptoms more consistent with GERD and deconditioning.\par No clear angina.  Reassuring MPI.\par \par Mod AS. Mod AI.\par \par BP controlled.\par \par Lipitor intolerance?\par \par ECHO (4/19/21): nL LVSF.  Mild MR.  Mod AI.  Mod AS.\par Adenosine NST (9/13/21): nL MPI and LVSF.

## 2021-11-18 NOTE — DISCUSSION/SUMMARY
[FreeTextEntry1] : Cont ASA.\par Cont Amlodipine-Benazepril.\par Low-dose Pravastatin trial.\par Started Protonix.  Strongly advised to follow-up with GI.\par Follow-up / ECHO 6-months.

## 2021-11-18 NOTE — REASON FOR VISIT
[Follow-Up - Clinic] : a clinic follow-up of [Coronary Artery Disease] : coronary artery disease [FreeTextEntry1] : Feels well.\par \par Bad knee pain.  Had injection yesterday.  Likely needs replacement.\par \par Stable exertional dyspnea.  Often occurs in the setting of knee pain, then gets anxious.\par \par Occasional chest discomfort while laying flat.  Better with Sue Golden.  Not taking antacids despite Espino's.  No anginal chest pain.\par \par Adenosine NST (9/13/21): nL MPI and LVSF.

## 2021-11-18 NOTE — HISTORY OF PRESENT ILLNESS
[FreeTextEntry1] : 79 year-old female presents to [facility] care.\par Previously followed with Dr. Alford.\par \par Cardiac history:\par CAD s/p PCI LAD / CX (2007).\par Repeat PCI 2011\par Murmur\par \par Worsening exertional dyspnea / fatigue x several months.  Difficulty with steps.  Breathing comfortable at rest.\par \par Vague chest discomfort.  Somewhat reminiscent of prior angina, but less intense.  Usually exertional, but occasionally at rest.\par \par Nocturnal palpitations in bed.\par \par Mild brief lightheadedness.  No syncope.\par \par Notably, admitted baseline anxiety is worse secondary to the [place] of [place].

## 2022-03-14 ENCOUNTER — APPOINTMENT (OUTPATIENT)
Dept: CARDIOLOGY | Facility: CLINIC | Age: 82
End: 2022-03-14
Payer: MEDICARE

## 2022-03-14 VITALS
WEIGHT: 150 LBS | DIASTOLIC BLOOD PRESSURE: 68 MMHG | HEART RATE: 98 BPM | BODY MASS INDEX: 25.61 KG/M2 | HEIGHT: 64 IN | SYSTOLIC BLOOD PRESSURE: 107 MMHG

## 2022-03-14 PROCEDURE — 99214 OFFICE O/P EST MOD 30 MIN: CPT

## 2022-03-14 PROCEDURE — 93000 ELECTROCARDIOGRAM COMPLETE: CPT

## 2022-03-14 PROCEDURE — 93306 TTE W/DOPPLER COMPLETE: CPT

## 2022-03-14 NOTE — ASSESSMENT
[FreeTextEntry1] : CAD s/p PCI (2007 / 2011).\par No clear angina.  Reassuring MPI.\par \par Mod AS. Mod AI.\par Possibly symptomatic / severe now.\par \par BP controlled.\par \par Lipitor intolerance?\par \par ECHO (4/19/21): nL LVSF.  Mild MR.  Mod AI.  Mod AS.\par Adenosine NST (9/13/21): nL MPI and LVSF.

## 2022-03-14 NOTE — REASON FOR VISIT
[Follow-Up - Clinic] : a clinic follow-up of [Coronary Artery Disease] : coronary artery disease [FreeTextEntry1] : Underwent knee replacement without complication.\par \par Feels worse / requested early follow-up.\par \par Decrease in ET.  Exertional dyspnea / fatigue / lightheadedness.\par \par No syncope.

## 2022-03-14 NOTE — DISCUSSION/SUMMARY
[FreeTextEntry1] : Cont ASA.\par Cont Amlodipine-Benazepril.\par Cont Pravastatin.\par ECHO today.\par Follow-up / further testing pending ECHO.

## 2022-03-14 NOTE — HISTORY OF PRESENT ILLNESS
[FreeTextEntry1] : 79 year-old female presents to [facility] care.\par Previously followed with Dr. Alford.\par \par Cardiac history:\par CAD s/p PCI LAD / CX (2007).\par Repeat PCI 2011\par Murmur\par \par Worsening exertional dyspnea / fatigue x several months.  Difficulty with steps.  Breathing comfortable at rest.\par \par Vague chest discomfort.  Somewhat reminiscent of prior angina, but less intense.  Usually exertional, but occasionally at rest.\par \par Nocturnal palpitations in bed.\par \par Mild brief lightheadedness.  No syncope.\par \par Notably, admitted baseline anxiety is worse secondary to the [place] of [place].

## 2022-03-14 NOTE — PHYSICAL EXAM
[de-identified] : well appearing, overweight, no distress [de-identified] : reg, nL s1, dec s2, 3/6 roseanne, no r/g [de-identified] : CTA [de-identified] : warm, no edema [de-identified] : alert, normal affect, logical conversation

## 2022-03-25 LAB
ALBUMIN SERPL ELPH-MCNC: 4.7 G/DL
ALP BLD-CCNC: 73 U/L
ALT SERPL-CCNC: 8 U/L
ANION GAP SERPL CALC-SCNC: 13 MMOL/L
AST SERPL-CCNC: 13 U/L
BASOPHILS # BLD AUTO: 0.04 K/UL
BASOPHILS NFR BLD AUTO: 0.9 %
BILIRUB SERPL-MCNC: 0.6 MG/DL
BUN SERPL-MCNC: 21 MG/DL
CALCIUM SERPL-MCNC: 9.9 MG/DL
CHLORIDE SERPL-SCNC: 103 MMOL/L
CO2 SERPL-SCNC: 26 MMOL/L
CREAT SERPL-MCNC: 0.8 MG/DL
EGFR: 74 ML/MIN/1.73M2
EOSINOPHIL # BLD AUTO: 0.09 K/UL
EOSINOPHIL NFR BLD AUTO: 2 %
GLUCOSE SERPL-MCNC: 106 MG/DL
HCT VFR BLD CALC: 40.6 %
HGB BLD-MCNC: 13.1 G/DL
IMM GRANULOCYTES NFR BLD AUTO: 0 %
INR PPP: 1.01 RATIO
LYMPHOCYTES # BLD AUTO: 1.62 K/UL
LYMPHOCYTES NFR BLD AUTO: 36.5 %
MAN DIFF?: NORMAL
MCHC RBC-ENTMCNC: 32 PG
MCHC RBC-ENTMCNC: 32.3 G/DL
MCV RBC AUTO: 99 FL
MONOCYTES # BLD AUTO: 0.43 K/UL
MONOCYTES NFR BLD AUTO: 9.7 %
NEUTROPHILS # BLD AUTO: 2.26 K/UL
NEUTROPHILS NFR BLD AUTO: 50.9 %
PLATELET # BLD AUTO: 246 K/UL
POTASSIUM SERPL-SCNC: 4.1 MMOL/L
PROT SERPL-MCNC: 7 G/DL
PT BLD: 11.6 SEC
RBC # BLD: 4.1 M/UL
RBC # FLD: 14.2 %
SODIUM SERPL-SCNC: 142 MMOL/L
WBC # FLD AUTO: 4.44 K/UL

## 2022-04-15 ENCOUNTER — RESULT REVIEW (OUTPATIENT)
Age: 82
End: 2022-04-15

## 2022-04-15 ENCOUNTER — OUTPATIENT (OUTPATIENT)
Dept: OUTPATIENT SERVICES | Facility: HOSPITAL | Age: 82
LOS: 1 days | Discharge: HOME | End: 2022-04-15
Payer: MEDICARE

## 2022-04-15 DIAGNOSIS — Z96.651 PRESENCE OF RIGHT ARTIFICIAL KNEE JOINT: Chronic | ICD-10-CM

## 2022-04-15 DIAGNOSIS — I35.0 NONRHEUMATIC AORTIC (VALVE) STENOSIS: ICD-10-CM

## 2022-04-15 DIAGNOSIS — Z90.710 ACQUIRED ABSENCE OF BOTH CERVIX AND UTERUS: Chronic | ICD-10-CM

## 2022-04-15 DIAGNOSIS — Z90.49 ACQUIRED ABSENCE OF OTHER SPECIFIED PARTS OF DIGESTIVE TRACT: Chronic | ICD-10-CM

## 2022-04-15 PROCEDURE — 74174 CTA ABD&PLVS W/CONTRAST: CPT | Mod: 26

## 2022-04-15 PROCEDURE — 75574 CT ANGIO HRT W/3D IMAGE: CPT | Mod: 26

## 2022-04-19 PROBLEM — Z87.19 HISTORY OF GASTROESOPHAGEAL REFLUX (GERD): Status: RESOLVED | Noted: 2022-04-19 | Resolved: 2022-04-19

## 2022-04-21 ENCOUNTER — APPOINTMENT (OUTPATIENT)
Dept: CARDIOTHORACIC SURGERY | Facility: CLINIC | Age: 82
End: 2022-04-21
Payer: MEDICARE

## 2022-04-21 ENCOUNTER — LABORATORY RESULT (OUTPATIENT)
Age: 82
End: 2022-04-21

## 2022-04-21 VITALS
WEIGHT: 155 LBS | RESPIRATION RATE: 18 BRPM | OXYGEN SATURATION: 98 % | SYSTOLIC BLOOD PRESSURE: 142 MMHG | BODY MASS INDEX: 26.46 KG/M2 | DIASTOLIC BLOOD PRESSURE: 86 MMHG | HEART RATE: 70 BPM | HEIGHT: 64 IN | TEMPERATURE: 98.3 F

## 2022-04-21 DIAGNOSIS — Z87.19 PERSONAL HISTORY OF OTHER DISEASES OF THE DIGESTIVE SYSTEM: ICD-10-CM

## 2022-04-21 PROCEDURE — 99202 OFFICE O/P NEW SF 15 MIN: CPT

## 2022-04-21 NOTE — ASSESSMENT
[FreeTextEntry1] : Ms. DANA LOPEZ 81 year F arrives  today for evaluation of their Aortic Stenosis and TAVR planning.  \par \par 5 Meter walk                                                 \par 1.     3                                                                       Frailty:   \par 2.     3.5                                                                     Right: 13.5 14.9 15.9\par 3.     3                                                                        Left: 11.8 12.9 14.0\par \par Plan:\par Echocardiogram reviewed with patient\par Pt with worsening LÓPEZ\par Will get Cardiac Catheterization for anginal symptoms\par Labs ordered\par Then will complete TAVR planning- will need Carotid Duplex, PFTs preop\par Had dental clearance\par \par I, Karly Saleh FNP-BC, am acting as scribe for \par \par  will need cath to r/o CAD. if OK the TAVR. understands all the risks.

## 2022-04-21 NOTE — HISTORY OF PRESENT ILLNESS
[FreeTextEntry1] : Ms. DANA LOPEZ 81 year F, never smoker,  arrives  today for evaluation of their aortic stenosis. Patient PMH include CAD S/P PCI X3 2007, 2009, 2015, HTN, GERD, Hysterectomy ?ovarian ca, TKR 1/2022.  Symptoms include SOB with stairs or activity, chest pressure when lying down- 3-4 Months, fatigue. NYHA class  III. Patient lives home alone, no aide.  Here for TAVR discussion. \par \par The STS risk score was calculated and discussed with the patient. All questions and concerns were addressed with the patient. \par \par They're healthcare team includes the following\par PMD: Dr. Jessica Flores\par Cardio: Dr. Lux\par Gastroenterologist: Dr. Clancy \par \par \par The patient is being worked up for TAVR. They were told that the patient will have an M-COT device placed for 30 days for monitoring of any arrhythmias and for the TAVR study. The patient is agreeable.\par

## 2022-04-21 NOTE — PHYSICAL EXAM
[General Appearance - Alert] : alert [General Appearance - In No Acute Distress] : in no acute distress [General Appearance - Well Nourished] : well nourished [Sclera] : the sclera and conjunctiva were normal [PERRL With Normal Accommodation] : pupils were equal in size, round, and reactive to light [Extraocular Movements] : extraocular movements were intact [Neck Appearance] : the appearance of the neck was normal [Respiration, Rhythm And Depth] : normal respiratory rhythm and effort [Exaggerated Use Of Accessory Muscles For Inspiration] : no accessory muscle use [Normal Rate] : normal [Rhythm Regular] : regular [IV] : a grade 4 [Examination Of The Chest] : the chest was normal in appearance [Skin Color & Pigmentation] : normal skin color and pigmentation [Skin Turgor] : normal skin turgor [] : no rash [Cranial Nerves] : cranial nerves 2-12 were intact [Deep Tendon Reflexes (DTR)] : deep tendon reflexes were 2+ and symmetric [Sensation] : the sensory exam was normal to light touch and pinprick [Oriented To Time, Place, And Person] : oriented to person, place, and time [Impaired Insight] : insight and judgment were intact [Affect] : the affect was normal

## 2022-04-25 ENCOUNTER — LABORATORY RESULT (OUTPATIENT)
Age: 82
End: 2022-04-25

## 2022-04-25 LAB
ANION GAP SERPL CALC-SCNC: 15 MMOL/L
BASOPHILS # BLD AUTO: 0.05 K/UL
BASOPHILS NFR BLD AUTO: 0.8 %
BUN SERPL-MCNC: 24 MG/DL
CALCIUM SERPL-MCNC: 10 MG/DL
CHLORIDE SERPL-SCNC: 103 MMOL/L
CO2 SERPL-SCNC: 23 MMOL/L
CREAT SERPL-MCNC: 0.7 MG/DL
EGFR: 87 ML/MIN/1.73M2
EOSINOPHIL # BLD AUTO: 0.14 K/UL
EOSINOPHIL NFR BLD AUTO: 2.2 %
GLUCOSE SERPL-MCNC: 106 MG/DL
HCT VFR BLD CALC: 43.8 %
HGB BLD-MCNC: 14 G/DL
IMM GRANULOCYTES NFR BLD AUTO: 0.2 %
LYMPHOCYTES # BLD AUTO: 2.17 K/UL
LYMPHOCYTES NFR BLD AUTO: 34.3 %
MAN DIFF?: NORMAL
MCHC RBC-ENTMCNC: 31.6 PG
MCHC RBC-ENTMCNC: 32 G/DL
MCV RBC AUTO: 98.9 FL
MONOCYTES # BLD AUTO: 0.58 K/UL
MONOCYTES NFR BLD AUTO: 9.2 %
NEUTROPHILS # BLD AUTO: 3.38 K/UL
NEUTROPHILS NFR BLD AUTO: 53.3 %
PLATELET # BLD AUTO: 267 K/UL
POTASSIUM SERPL-SCNC: 4.3 MMOL/L
RBC # BLD: 4.43 M/UL
RBC # FLD: 14.3 %
SODIUM SERPL-SCNC: 141 MMOL/L
WBC # FLD AUTO: 6.33 K/UL

## 2022-04-27 ENCOUNTER — INPATIENT (INPATIENT)
Facility: HOSPITAL | Age: 82
LOS: 0 days | Discharge: HOME HEALTH | End: 2022-04-28
Attending: STUDENT IN AN ORGANIZED HEALTH CARE EDUCATION/TRAINING PROGRAM | Admitting: STUDENT IN AN ORGANIZED HEALTH CARE EDUCATION/TRAINING PROGRAM
Payer: MEDICARE

## 2022-04-27 VITALS
OXYGEN SATURATION: 98 % | HEIGHT: 64.5 IN | DIASTOLIC BLOOD PRESSURE: 57 MMHG | RESPIRATION RATE: 12 BRPM | HEART RATE: 74 BPM | SYSTOLIC BLOOD PRESSURE: 116 MMHG | WEIGHT: 153 LBS

## 2022-04-27 DIAGNOSIS — Z90.49 ACQUIRED ABSENCE OF OTHER SPECIFIED PARTS OF DIGESTIVE TRACT: Chronic | ICD-10-CM

## 2022-04-27 DIAGNOSIS — Z96.651 PRESENCE OF RIGHT ARTIFICIAL KNEE JOINT: Chronic | ICD-10-CM

## 2022-04-27 DIAGNOSIS — Z90.710 ACQUIRED ABSENCE OF BOTH CERVIX AND UTERUS: Chronic | ICD-10-CM

## 2022-04-27 LAB
ANION GAP SERPL CALC-SCNC: 15 MMOL/L — HIGH (ref 7–14)
BASE EXCESS BLDMV CALC-SCNC: 1.7 MMOL/L — SIGNIFICANT CHANGE UP
BUN SERPL-MCNC: 15 MG/DL — SIGNIFICANT CHANGE UP (ref 10–20)
CALCIUM SERPL-MCNC: 9.5 MG/DL — SIGNIFICANT CHANGE UP (ref 8.5–10.1)
CHLORIDE SERPL-SCNC: 102 MMOL/L — SIGNIFICANT CHANGE UP (ref 98–110)
CO2 SERPL-SCNC: 24 MMOL/L — SIGNIFICANT CHANGE UP (ref 17–32)
CREAT SERPL-MCNC: 0.7 MG/DL — SIGNIFICANT CHANGE UP (ref 0.7–1.5)
EGFR: 87 ML/MIN/1.73M2 — SIGNIFICANT CHANGE UP
GAS PNL BLDA: SIGNIFICANT CHANGE UP
GAS PNL BLDMV: SIGNIFICANT CHANGE UP
GLUCOSE SERPL-MCNC: 99 MG/DL — SIGNIFICANT CHANGE UP (ref 70–99)
HCO3 BLDMV-SCNC: 28 MMOL/L — SIGNIFICANT CHANGE UP
HCT VFR BLD CALC: 41.4 % — SIGNIFICANT CHANGE UP (ref 37–47)
HGB BLD-MCNC: 13.9 G/DL — SIGNIFICANT CHANGE UP (ref 12–16)
MCHC RBC-ENTMCNC: 32.1 PG — HIGH (ref 27–31)
MCHC RBC-ENTMCNC: 33.6 G/DL — SIGNIFICANT CHANGE UP (ref 32–37)
MCV RBC AUTO: 95.6 FL — SIGNIFICANT CHANGE UP (ref 81–99)
NRBC # BLD: 0 /100 WBCS — SIGNIFICANT CHANGE UP (ref 0–0)
O2 CT VFR BLD CALC: 47 MMHG — SIGNIFICANT CHANGE UP
PCO2 BLDMV: 48 MMHG — SIGNIFICANT CHANGE UP
PH BLDMV: 7.37 — SIGNIFICANT CHANGE UP
PLATELET # BLD AUTO: 270 K/UL — SIGNIFICANT CHANGE UP (ref 130–400)
POTASSIUM SERPL-MCNC: 4.2 MMOL/L — SIGNIFICANT CHANGE UP (ref 3.5–5)
POTASSIUM SERPL-SCNC: 4.2 MMOL/L — SIGNIFICANT CHANGE UP (ref 3.5–5)
RBC # BLD: 4.33 M/UL — SIGNIFICANT CHANGE UP (ref 4.2–5.4)
RBC # FLD: 14.2 % — SIGNIFICANT CHANGE UP (ref 11.5–14.5)
SAO2 % BLDMV: 81.6 % — SIGNIFICANT CHANGE UP
SODIUM SERPL-SCNC: 141 MMOL/L — SIGNIFICANT CHANGE UP (ref 135–146)
WBC # BLD: 5.94 K/UL — SIGNIFICANT CHANGE UP (ref 4.8–10.8)
WBC # FLD AUTO: 5.94 K/UL — SIGNIFICANT CHANGE UP (ref 4.8–10.8)

## 2022-04-27 PROCEDURE — 93460 R&L HRT ART/VENTRICLE ANGIO: CPT | Mod: 26

## 2022-04-27 PROCEDURE — 93010 ELECTROCARDIOGRAM REPORT: CPT

## 2022-04-27 RX ORDER — LISINOPRIL 2.5 MG/1
20 TABLET ORAL DAILY
Refills: 0 | Status: DISCONTINUED | OUTPATIENT
Start: 2022-04-27 | End: 2022-04-28

## 2022-04-27 RX ORDER — AMLODIPINE BESYLATE 2.5 MG/1
5 TABLET ORAL DAILY
Refills: 0 | Status: DISCONTINUED | OUTPATIENT
Start: 2022-04-27 | End: 2022-04-28

## 2022-04-27 RX ORDER — METOPROLOL TARTRATE 50 MG
1 TABLET ORAL
Qty: 0 | Refills: 0 | DISCHARGE

## 2022-04-27 RX ORDER — ALPRAZOLAM 0.25 MG
0 TABLET ORAL
Qty: 0 | Refills: 0 | DISCHARGE

## 2022-04-27 RX ORDER — SUCRALFATE 1 G
1 TABLET ORAL
Qty: 0 | Refills: 0 | DISCHARGE

## 2022-04-27 RX ORDER — PANTOPRAZOLE SODIUM 20 MG/1
40 TABLET, DELAYED RELEASE ORAL
Refills: 0 | Status: DISCONTINUED | OUTPATIENT
Start: 2022-04-27 | End: 2022-04-28

## 2022-04-27 RX ORDER — ASPIRIN/CALCIUM CARB/MAGNESIUM 324 MG
81 TABLET ORAL DAILY
Refills: 0 | Status: DISCONTINUED | OUTPATIENT
Start: 2022-04-27 | End: 2022-04-28

## 2022-04-27 RX ORDER — ATORVASTATIN CALCIUM 80 MG/1
10 TABLET, FILM COATED ORAL AT BEDTIME
Refills: 0 | Status: DISCONTINUED | OUTPATIENT
Start: 2022-04-27 | End: 2022-04-28

## 2022-04-27 RX ORDER — RANITIDINE HYDROCHLORIDE 150 MG/1
1 TABLET, FILM COATED ORAL
Qty: 0 | Refills: 0 | DISCHARGE

## 2022-04-27 NOTE — H&P CARDIOLOGY - HISTORY OF PRESENT ILLNESS
81 y/old F here for [facility] due to AS and worsening exertional dyspnea and fatigue x several month. Difficulty with steps. Chest discomfort somewhat reminiscent of prior angina, but less intense. Usually exertional, but occasionally at rest.   PMH: CAD s/p PCI x3, 2007/2009/2015; AS, HTN, Non-rheumatic aortic regurgitation, GERD, Anxiety, Ovarian Ca  PSH: Hysterectomy, knee replacement   FH: CVA - Mother    Pre cath note:    indication:  [ ] STEMI                [ ] NSTEMI                 [ ] Acute coronary syndrome                     [ ]Unstable Angina   [ ] high risk  [ ] intermediate risk  [ ] low risk                     [x ] Stable Angina     non-invasive testing:  NST                       Date: 9/13/21       result: [ ] high risk  [x ] intermediate risk  [ ] low risk    Anti- Anginal medications:                    [ ] not used                       [x ] used                   [ ] not used but strong indication not to use    Ejection Fraction                   [ ] <29            [ ] 30-39%   [ ] 40-49%     [x ]>50%    CHF                   [ ] active (within last 14 days on meds   [ ] Chronic (on meds but no exacerbation)    COPD                   [ ] mild (on chronic bronchodilators)  [ ] moderate (on chronic steroid therapy)      [ ] severe (indication for home O2 or PACO2 >50)    Other risk factors:                       [ ] Previous MI                     [ ] CVA/ stroke                    [ ] carotid stent/ CEA                    [ ] PVD/PAD- (arterial aneurysm, non-palpable pulses, tortuous vessel with inability to insert catheter, infra-renal dissection, renal or subclavian artery stenosis)                    [ ] diabetic                    [ ] previous CABG                    [ ] Renal Failure     < from: NM Nuclear Stress Pharmacologic Multiple (09.13.21 @ 15:27) >  EXAM:  NM NUCLEAR STRESS MULTI PHARM          PROCEDURE DATE:  09/13/2021      INTERPRETATION:  Clinical History / Reason for exam: REST/STRESS DUAL ISOTOPE SPECT IMAGING WITH PHARMACOLOGIC STRESS AND GATED SPECT IMAGING  Indication: Patient is a 81-year-old female with history of hypertension who is referred for evaluation of chest pain and shortness of breath.  Procedure:  Pharmacologic stress testing was performed with adenosine with a dose of 39 mg.  Blood pressure response was appropriate during the infusion of the adenosine.  The patient tolerated the procedure without any significant symptoms.  The resting electrocardiogram demonstrated sinus rhythm and did not show ST-segment changes consistent with myocardial ischemia.  Myocardial perfusion imaging was performed at rest (15 minutes following the injection of 3.5 mCi of Thallium).  At peak pharmacologic effect, the patient was injected with 30 mCi of Cardiolite.  Gating post-stress tomographic imaging was performed 45 minutes after stress.    Findings:  The overall quality of the study is good.  Rotational cine display reveals no artifact.  SPECT images demonstrate no fixed, no reperfusion defects. There is no lung uptake, no dilatation of the left ventricle.  Gated SPECT imaging demonstrates normal thickening and motion of all left ventricular segments.  The left ventricular ejection fraction was calculated to be above 55%.  Impression:  1. IV Adenosine Dual Isotope Study which was negative with respect to symptoms and EKG changes.  2. Myocardial perfusion imaging reveals no evidence of myocardial ischemia or scar..  3. Gated imaging reveals normal left ventricular wall motion and systolic function.  Recommendation:  Risk factor modification.    MIKI STOCKTON MD; Attending Cardiologist  This document has been electronically signed. Sep 15 2021  9:20PM    Adjusted CathPCI Bleeding Event Risk: 1.6%  Herbert Test ( Normal )

## 2022-04-28 ENCOUNTER — TRANSCRIPTION ENCOUNTER (OUTPATIENT)
Age: 82
End: 2022-04-28

## 2022-04-28 VITALS
DIASTOLIC BLOOD PRESSURE: 66 MMHG | RESPIRATION RATE: 18 BRPM | HEART RATE: 61 BPM | TEMPERATURE: 97 F | SYSTOLIC BLOOD PRESSURE: 139 MMHG

## 2022-04-28 LAB
APPEARANCE UR: ABNORMAL
BACTERIA # UR AUTO: NEGATIVE — SIGNIFICANT CHANGE UP
BILIRUB UR-MCNC: NEGATIVE — SIGNIFICANT CHANGE UP
COLOR SPEC: SIGNIFICANT CHANGE UP
DIFF PNL FLD: NEGATIVE — SIGNIFICANT CHANGE UP
EPI CELLS # UR: 0 /HPF — SIGNIFICANT CHANGE UP (ref 0–5)
GLUCOSE UR QL: NEGATIVE — SIGNIFICANT CHANGE UP
HYALINE CASTS # UR AUTO: 2 /LPF — SIGNIFICANT CHANGE UP (ref 0–7)
KETONES UR-MCNC: NEGATIVE — SIGNIFICANT CHANGE UP
LEUKOCYTE ESTERASE UR-ACNC: ABNORMAL
NITRITE UR-MCNC: POSITIVE
PH UR: 6 — SIGNIFICANT CHANGE UP (ref 5–8)
PROT UR-MCNC: NEGATIVE — SIGNIFICANT CHANGE UP
RBC CASTS # UR COMP ASSIST: 2 /HPF — SIGNIFICANT CHANGE UP (ref 0–4)
SP GR SPEC: 1.02 — SIGNIFICANT CHANGE UP (ref 1.01–1.03)
UROBILINOGEN FLD QL: SIGNIFICANT CHANGE UP
WBC UR QL: 30 /HPF — HIGH (ref 0–5)

## 2022-04-28 PROCEDURE — 99232 SBSQ HOSP IP/OBS MODERATE 35: CPT

## 2022-04-28 PROCEDURE — 93880 EXTRACRANIAL BILAT STUDY: CPT | Mod: 26

## 2022-04-28 PROCEDURE — 94010 BREATHING CAPACITY TEST: CPT | Mod: 26

## 2022-04-28 RX ORDER — NITROFURANTOIN MACROCRYSTAL 50 MG
1 CAPSULE ORAL
Qty: 14 | Refills: 0
Start: 2022-04-28 | End: 2022-05-04

## 2022-04-28 RX ORDER — METHENAMINE MANDELATE 1 G
1 TABLET ORAL
Qty: 14 | Refills: 0
Start: 2022-04-28 | End: 2022-05-04

## 2022-04-28 RX ORDER — NITROFURANTOIN MACROCRYSTAL 50 MG
1 CAPSULE ORAL
Qty: 14 | Refills: 0
Start: 2022-04-28 | End: 2022-05-11

## 2022-04-28 RX ADMIN — PANTOPRAZOLE SODIUM 40 MILLIGRAM(S): 20 TABLET, DELAYED RELEASE ORAL at 05:41

## 2022-04-28 RX ADMIN — Medication 81 MILLIGRAM(S): at 12:38

## 2022-04-28 RX ADMIN — AMLODIPINE BESYLATE 5 MILLIGRAM(S): 2.5 TABLET ORAL at 05:40

## 2022-04-28 RX ADMIN — LISINOPRIL 20 MILLIGRAM(S): 2.5 TABLET ORAL at 05:44

## 2022-04-28 NOTE — DISCHARGE NOTE NURSING/CASE MANAGEMENT/SOCIAL WORK - NSDCPEFALRISK_GEN_ALL_CORE
For information on Fall & Injury Prevention, visit: [email or web address] OR  [email or web address] OR  https://www.cdc.gov/steadi/patient.html

## 2022-04-28 NOTE — DISCHARGE NOTE PROVIDER - HOSPITAL COURSE
81 y/old F here for C due to AS and worsening exertional dyspnea and fatigue x several month. Difficulty with steps. Chest discomfort somewhat reminiscent of prior angina, but less intense. Usually exertional, but occasionally at rest. Patient is s/p left hear cath. Will require out patient Valve repair w Dr Lux next week. Spriometry, carotic US, CBC, CMP, Coagulation panel, BNP and UA sent in preparation of valve repair May 4th    #CAD  s/p LHC   -c/w amlodipine 5mg once daily  -c/w lisinopril 20mg once daily  -c/w atorastatin 10mg at bedtime   -f/u above labs     #Valvulopathy   patient scheduled for valve repair w Dr Lux next week   -f/u carotid US  -f/u spirometry  -f/u above labs      81 y/old F with severe AS symptomatic  with worsening dyspnea on exertion with chest discomfort. s/p C non obstructive CAD  Patient currently stable, no active symptoms at rest. will plan to dc home today and follow up with Dr Lux for TAVR planing.

## 2022-04-28 NOTE — DISCHARGE NOTE PROVIDER - NSDCMRMEDTOKEN_GEN_ALL_CORE_FT
acetaminophen 325 mg oral tablet: 2 tab(s) orally every 6 hours, As needed, Mild Pain (1 - 3)  amlodipine-benazepril 5 mg-20 mg oral capsule: 1 cap(s) orally once a day  aspirin 81 mg oral tablet, chewable: 1 tab(s) orally once a day  pravastatin 10 mg oral tablet: 1 tab(s) orally once a day (at bedtime)  Protonix 40 mg oral delayed release tablet: 1 tab(s) orally once a day   amlodipine-benazepril 5 mg-20 mg oral capsule: 1 cap(s) orally once a day  aspirin 81 mg oral tablet, chewable: 1 tab(s) orally once a day  Macrobid 100 mg oral capsule: 1 cap(s) orally 2 times a day   pravastatin 10 mg oral tablet: 1 tab(s) orally once a day (at bedtime)  Protonix 40 mg oral delayed release tablet: 1 tab(s) orally once a day

## 2022-04-28 NOTE — DISCHARGE NOTE PROVIDER - NSCORESITESY/N_GEN_A_CORE_RD
Immediate Brief Procedure Note    Patient: Deepti Delgado    Pre-op Dx:   1. Other abnormal and inconclusive findings on diagnostic imaging of breast         Post-op Dx: Same     Procedure: US-Guided Axillary Lymph Node Biopsy Left  Axilla, US-Guided Biopsy Bilateral  Breast and Marker Clip Placement all 3 sites     Surgeon: Martin Arciniega MD     Anesthesia Type: Local    Findings: Pathology Pending    Estimated Blood Loss: scant ml    Complications: None    Specimens Removed: Multiple Cores A: right breast, B: left breast, C: left axillary LN    
No

## 2022-04-28 NOTE — DISCHARGE NOTE PROVIDER - PROVIDER TOKENS
PROVIDER:[TOKEN:[[id number]],SCHEDULEDAPPT:[05/04/2022],ESTABLISHEDPATIENT:[T]],PROVIDER:[TOKEN:[[id number]],SCHEDULEDAPPT:[06/17/2022],ESTABLISHEDPATIENT:[T]]

## 2022-04-28 NOTE — DISCHARGE NOTE PROVIDER - CARE PROVIDERS DIRECT ADDRESSES
[email or web address]

## 2022-04-28 NOTE — DISCHARGE NOTE PROVIDER - NSDCCPCAREPLAN_GEN_ALL_CORE_FT
PRINCIPAL DISCHARGE DIAGNOSIS  Diagnosis: CAD (coronary artery disease)  Assessment and Plan of Treatment: You came to the hospital for a Left Heart Catheterization which was performed 4/27. You were monitored overnight and have been doing well. On 4/28 we collected blood, performed a carotid artery US and spirometry tests in anticipation of your valve repair May 4th

## 2022-04-28 NOTE — DISCHARGE NOTE NURSING/CASE MANAGEMENT/SOCIAL WORK - PATIENT PORTAL LINK FT
You can access the FollowMyHealth Patient Portal offered by [facility] by registering at the following website: [email or web address]. By joining [employer] [facility]’s FollowMyHealth portal, you will also be able to view your health information using other applications (apps) compatible with our system.

## 2022-04-28 NOTE — DISCHARGE NOTE PROVIDER - NSDCFUSCHEDAPPT_GEN_ALL_CORE_FT
Val Ibanez  [facility] Physician [facility]  CT Surg [place] [address]  Scheduled Appointment: 05/04/2022    Andriy Lux  [facility]  Cardio [address]  Scheduled Appointment: 06/17/2022

## 2022-04-28 NOTE — DISCHARGE NOTE PROVIDER - CARE PROVIDER_API CALL
Val Ibanez)  Thoracic and Cardiac Surgery  [address] [place] [address], [address]  [place] [address]  Phone: [phone]  Fax: [phone]  Established Patient  Scheduled Appointment: 05/04/2022    Andriy Lux)  Cardiovascular Disease; Internal Medicine  [address] [place] [address], [address]  [place] [address]  Phone: [phone]  Fax: [phone]  Established Patient  Scheduled Appointment: 06/17/2022

## 2022-05-01 ENCOUNTER — LABORATORY RESULT (OUTPATIENT)
Age: 82
End: 2022-05-01

## 2022-05-03 VITALS
WEIGHT: 147.93 LBS | HEIGHT: 64.02 IN | SYSTOLIC BLOOD PRESSURE: 141 MMHG | RESPIRATION RATE: 20 BRPM | TEMPERATURE: 97 F | OXYGEN SATURATION: 98 % | HEART RATE: 95 BPM | DIASTOLIC BLOOD PRESSURE: 74 MMHG

## 2022-05-03 NOTE — PRE-OP CHECKLIST - SELECT TESTS ORDERED
CBC/CMP/PT/PTT/INR/Type and Screen/Urinalysis/EKG/CXR/COVID-19 CBC/CMP/PT/PTT/INR/Type and Screen/Urinalysis/EKG/CXR/Results in MD note/COVID-19

## 2022-05-03 NOTE — PATIENT PROFILE ADULT - FALL HARM RISK - UNIVERSAL INTERVENTIONS
Bed in lowest position, wheels locked, appropriate side rails in place/Call bell, personal items and telephone in reach/Instruct patient to call for assistance before getting out of bed or chair/Non-slip footwear when patient is out of bed/[place] to call system/Physically safe environment - no spills, clutter or unnecessary equipment/Purposeful Proactive Rounding/Room/bathroom lighting operational, light cord in reach

## 2022-05-04 ENCOUNTER — TRANSCRIPTION ENCOUNTER (OUTPATIENT)
Age: 82
End: 2022-05-04

## 2022-05-04 ENCOUNTER — INPATIENT (INPATIENT)
Facility: HOSPITAL | Age: 82
LOS: 0 days | Discharge: HOME | End: 2022-05-05
Attending: THORACIC SURGERY (CARDIOTHORACIC VASCULAR SURGERY) | Admitting: THORACIC SURGERY (CARDIOTHORACIC VASCULAR SURGERY)
Payer: MEDICARE

## 2022-05-04 ENCOUNTER — APPOINTMENT (OUTPATIENT)
Dept: CARDIOTHORACIC SURGERY | Facility: HOSPITAL | Age: 82
End: 2022-05-04
Payer: MEDICARE

## 2022-05-04 DIAGNOSIS — Z90.49 ACQUIRED ABSENCE OF OTHER SPECIFIED PARTS OF DIGESTIVE TRACT: Chronic | ICD-10-CM

## 2022-05-04 DIAGNOSIS — Z96.651 PRESENCE OF RIGHT ARTIFICIAL KNEE JOINT: Chronic | ICD-10-CM

## 2022-05-04 DIAGNOSIS — Z90.710 ACQUIRED ABSENCE OF BOTH CERVIX AND UTERUS: Chronic | ICD-10-CM

## 2022-05-04 LAB
ALBUMIN SERPL ELPH-MCNC: 3.9 G/DL — SIGNIFICANT CHANGE UP (ref 3.5–5.2)
ALP SERPL-CCNC: 60 U/L — SIGNIFICANT CHANGE UP (ref 30–115)
ALT FLD-CCNC: 6 U/L — SIGNIFICANT CHANGE UP (ref 0–41)
ANION GAP SERPL CALC-SCNC: 13 MMOL/L — SIGNIFICANT CHANGE UP (ref 7–14)
APTT BLD: 33 SEC — SIGNIFICANT CHANGE UP (ref 27–39.2)
AST SERPL-CCNC: 13 U/L — SIGNIFICANT CHANGE UP (ref 0–41)
BILIRUB SERPL-MCNC: 0.4 MG/DL — SIGNIFICANT CHANGE UP (ref 0.2–1.2)
BLD GP AB SCN SERPL QL: SIGNIFICANT CHANGE UP
BUN SERPL-MCNC: 15 MG/DL — SIGNIFICANT CHANGE UP (ref 10–20)
CALCIUM SERPL-MCNC: 8.6 MG/DL — SIGNIFICANT CHANGE UP (ref 8.5–10.1)
CHLORIDE SERPL-SCNC: 106 MMOL/L — SIGNIFICANT CHANGE UP (ref 98–110)
CO2 SERPL-SCNC: 21 MMOL/L — SIGNIFICANT CHANGE UP (ref 17–32)
CREAT SERPL-MCNC: 0.6 MG/DL — LOW (ref 0.7–1.5)
EGFR: 91 ML/MIN/1.73M2 — SIGNIFICANT CHANGE UP
GAS PNL BLDA: SIGNIFICANT CHANGE UP
GLUCOSE SERPL-MCNC: 107 MG/DL — HIGH (ref 70–99)
HCT VFR BLD CALC: 34.8 % — LOW (ref 37–47)
HGB BLD-MCNC: 11.8 G/DL — LOW (ref 12–16)
INR BLD: 1.07 RATIO — SIGNIFICANT CHANGE UP (ref 0.65–1.3)
MAGNESIUM SERPL-MCNC: 2.3 MG/DL — SIGNIFICANT CHANGE UP (ref 1.8–2.4)
MCHC RBC-ENTMCNC: 31.9 PG — HIGH (ref 27–31)
MCHC RBC-ENTMCNC: 33.9 G/DL — SIGNIFICANT CHANGE UP (ref 32–37)
MCV RBC AUTO: 94.1 FL — SIGNIFICANT CHANGE UP (ref 81–99)
NRBC # BLD: 0 /100 WBCS — SIGNIFICANT CHANGE UP (ref 0–0)
PLATELET # BLD AUTO: 209 K/UL — SIGNIFICANT CHANGE UP (ref 130–400)
POTASSIUM SERPL-MCNC: 4.1 MMOL/L — SIGNIFICANT CHANGE UP (ref 3.5–5)
POTASSIUM SERPL-SCNC: 4.1 MMOL/L — SIGNIFICANT CHANGE UP (ref 3.5–5)
PROT SERPL-MCNC: 5.8 G/DL — LOW (ref 6–8)
PROTHROM AB SERPL-ACNC: 12.3 SEC — SIGNIFICANT CHANGE UP (ref 9.95–12.87)
RBC # BLD: 3.7 M/UL — LOW (ref 4.2–5.4)
RBC # FLD: 14.1 % — SIGNIFICANT CHANGE UP (ref 11.5–14.5)
SODIUM SERPL-SCNC: 140 MMOL/L — SIGNIFICANT CHANGE UP (ref 135–146)
WBC # BLD: 4.81 K/UL — SIGNIFICANT CHANGE UP (ref 4.8–10.8)
WBC # FLD AUTO: 4.81 K/UL — SIGNIFICANT CHANGE UP (ref 4.8–10.8)

## 2022-05-04 PROCEDURE — 93010 ELECTROCARDIOGRAM REPORT: CPT | Mod: 76,77

## 2022-05-04 PROCEDURE — 93306 TTE W/DOPPLER COMPLETE: CPT | Mod: 26

## 2022-05-04 PROCEDURE — ZZZZZ: CPT

## 2022-05-04 PROCEDURE — 93010 ELECTROCARDIOGRAM REPORT: CPT

## 2022-05-04 PROCEDURE — 71045 X-RAY EXAM CHEST 1 VIEW: CPT | Mod: 26

## 2022-05-04 PROCEDURE — 33361 REPLACE AORTIC VALVE PERQ: CPT | Mod: 62,Q0

## 2022-05-04 RX ORDER — ACETAMINOPHEN 500 MG
650 TABLET ORAL ONCE
Refills: 0 | Status: COMPLETED | OUTPATIENT
Start: 2022-05-04 | End: 2022-05-04

## 2022-05-04 RX ORDER — DEXTROSE 50 % IN WATER 50 %
50 SYRINGE (ML) INTRAVENOUS
Refills: 0 | Status: DISCONTINUED | OUTPATIENT
Start: 2022-05-04 | End: 2022-05-05

## 2022-05-04 RX ORDER — NITROGLYCERIN 6.5 MG
30 CAPSULE, EXTENDED RELEASE ORAL
Qty: 50 | Refills: 0 | Status: DISCONTINUED | OUTPATIENT
Start: 2022-05-04 | End: 2022-05-05

## 2022-05-04 RX ORDER — CEFAZOLIN SODIUM 1 G
1000 VIAL (EA) INJECTION EVERY 8 HOURS
Refills: 0 | Status: COMPLETED | OUTPATIENT
Start: 2022-05-04 | End: 2022-05-05

## 2022-05-04 RX ORDER — MAGNESIUM SULFATE 500 MG/ML
1 VIAL (ML) INJECTION ONCE
Refills: 0 | Status: COMPLETED | OUTPATIENT
Start: 2022-05-04 | End: 2022-05-04

## 2022-05-04 RX ORDER — INSULIN HUMAN 100 [IU]/ML
10 INJECTION, SOLUTION SUBCUTANEOUS
Qty: 100 | Refills: 0 | Status: DISCONTINUED | OUTPATIENT
Start: 2022-05-04 | End: 2022-05-05

## 2022-05-04 RX ORDER — SODIUM CHLORIDE 9 MG/ML
1000 INJECTION INTRAMUSCULAR; INTRAVENOUS; SUBCUTANEOUS
Refills: 0 | Status: DISCONTINUED | OUTPATIENT
Start: 2022-05-04 | End: 2022-05-05

## 2022-05-04 RX ORDER — NICARDIPINE HYDROCHLORIDE 30 MG/1
5 CAPSULE, EXTENDED RELEASE ORAL
Qty: 40 | Refills: 0 | Status: DISCONTINUED | OUTPATIENT
Start: 2022-05-04 | End: 2022-05-05

## 2022-05-04 RX ORDER — CLOPIDOGREL BISULFATE 75 MG/1
75 TABLET, FILM COATED ORAL DAILY
Refills: 0 | Status: DISCONTINUED | OUTPATIENT
Start: 2022-05-05 | End: 2022-05-05

## 2022-05-04 RX ORDER — DEXTROSE 50 % IN WATER 50 %
25 SYRINGE (ML) INTRAVENOUS
Refills: 0 | Status: DISCONTINUED | OUTPATIENT
Start: 2022-05-04 | End: 2022-05-05

## 2022-05-04 RX ORDER — MEPERIDINE HYDROCHLORIDE 50 MG/ML
25 INJECTION INTRAMUSCULAR; INTRAVENOUS; SUBCUTANEOUS ONCE
Refills: 0 | Status: DISCONTINUED | OUTPATIENT
Start: 2022-05-04 | End: 2022-05-05

## 2022-05-04 RX ORDER — NITROFURANTOIN MACROCRYSTAL 50 MG
100 CAPSULE ORAL
Refills: 0 | Status: DISCONTINUED | OUTPATIENT
Start: 2022-05-04 | End: 2022-05-05

## 2022-05-04 RX ORDER — ACETAMINOPHEN 500 MG
1000 TABLET ORAL ONCE
Refills: 0 | Status: COMPLETED | OUTPATIENT
Start: 2022-05-04 | End: 2022-05-05

## 2022-05-04 RX ORDER — CLOPIDOGREL BISULFATE 75 MG/1
300 TABLET, FILM COATED ORAL ONCE
Refills: 0 | Status: COMPLETED | OUTPATIENT
Start: 2022-05-04 | End: 2022-05-04

## 2022-05-04 RX ORDER — NOREPINEPHRINE BITARTRATE/D5W 8 MG/250ML
0.05 PLASTIC BAG, INJECTION (ML) INTRAVENOUS
Qty: 8 | Refills: 0 | Status: DISCONTINUED | OUTPATIENT
Start: 2022-05-04 | End: 2022-05-05

## 2022-05-04 RX ORDER — VASOPRESSIN 20 [USP'U]/ML
0.04 INJECTION INTRAVENOUS
Qty: 50 | Refills: 0 | Status: DISCONTINUED | OUTPATIENT
Start: 2022-05-04 | End: 2022-05-05

## 2022-05-04 RX ORDER — OXYCODONE HYDROCHLORIDE 5 MG/1
5 TABLET ORAL EVERY 6 HOURS
Refills: 0 | Status: DISCONTINUED | OUTPATIENT
Start: 2022-05-04 | End: 2022-05-05

## 2022-05-04 RX ORDER — ASPIRIN/CALCIUM CARB/MAGNESIUM 324 MG
81 TABLET ORAL DAILY
Refills: 0 | Status: DISCONTINUED | OUTPATIENT
Start: 2022-05-04 | End: 2022-05-05

## 2022-05-04 RX ORDER — PANTOPRAZOLE SODIUM 20 MG/1
40 TABLET, DELAYED RELEASE ORAL
Refills: 0 | Status: DISCONTINUED | OUTPATIENT
Start: 2022-05-04 | End: 2022-05-05

## 2022-05-04 RX ORDER — CHLORHEXIDINE GLUCONATE 213 G/1000ML
5 SOLUTION TOPICAL
Refills: 0 | Status: DISCONTINUED | OUTPATIENT
Start: 2022-05-04 | End: 2022-05-05

## 2022-05-04 RX ORDER — POLYETHYLENE GLYCOL 3350 17 G/17G
17 POWDER, FOR SOLUTION ORAL DAILY
Refills: 0 | Status: DISCONTINUED | OUTPATIENT
Start: 2022-05-04 | End: 2022-05-05

## 2022-05-04 RX ADMIN — CLOPIDOGREL BISULFATE 300 MILLIGRAM(S): 75 TABLET, FILM COATED ORAL at 09:59

## 2022-05-04 RX ADMIN — Medication 100 MILLIGRAM(S): at 16:07

## 2022-05-04 RX ADMIN — Medication 100 GRAM(S): at 16:08

## 2022-05-04 RX ADMIN — Medication 650 MILLIGRAM(S): at 19:15

## 2022-05-04 RX ADMIN — Medication 100 MILLIGRAM(S): at 21:11

## 2022-05-04 RX ADMIN — Medication 650 MILLIGRAM(S): at 19:45

## 2022-05-04 RX ADMIN — Medication 81 MILLIGRAM(S): at 17:40

## 2022-05-04 RX ADMIN — Medication 100 MILLIGRAM(S): at 17:40

## 2022-05-04 NOTE — BRIEF OPERATIVE NOTE - OPERATION/FINDINGS
as per operative dictation as per operative dictation,  Uneventful  post implant gradient less than 1mm Hg

## 2022-05-04 NOTE — PRE-ANESTHESIA EVALUATION ADULT - NSANTHAPLANRD_GEN_ALL_CORE
Add 08460 Cpt? (Important Note: In 2017 The Use Of 31652 Is Being Tracked By Cms To Determine Future Global Period Reimbursement For Global Periods): no Detail Level: Simple monitored anesthesia care (MAC)

## 2022-05-04 NOTE — H&P ADULT - NSHPREVIEWOFSYSTEMS_GEN_ALL_CORE
CONSTITUTIONAL:  Fevers[ ] chills[ ] sweats[ ] fatigue[ ] weight loss[ ] weight gain [ ]                                     NEGATIVE [X ]   NEURO:  paresthesias[ ] seizures [ ]  syncope [ ]  confusion [ ]                                                                                NEGATIVE[ X]   EYES: glasses[ ]  blurry vision[ ]  discharge[ ] pain[ ] glaucoma [ ]                                                                          NEGATIVE[X ]   ENMT:  difficulty hearing [ ]  vertigo[ ]  dysphagia[ ] epistaxis[ ] recent dental work [ ]                                    NEGATIVE[ X]   CV:  chest pain[ ] palpitations[ ] LÓPEZ [ ] diaphoresis [ ]                                                                                           NEGATIVE[ X]   RESPIRATORY:  wheezing[ ] SOB[ ] cough [ ] sputum[ ] hemoptysis[ ]                                                                  NEGATIVE[ ]   GI:  nausea[ ]  vomiting [ ]  diarrhea[ ] constipation [ ] melena [ ]                                                                         NEGATIVE[ X]   : hematuria[ ]  dysuria[ ] urgency[ ] incontinence[ ]                                                                                            NEGATIVE[ X]   MUSKULOSKELETAL:  arthritis[ ]  joint swelling [ ] muscle weakness [ ] Hx vein stripping [ ]                             NEGATIVE[X ]   SKIN/BREAST:  rash[ ] itching [ ]  hair loss[ ] masses[ ]                                                                                              NEGATIVE[ X]   PSYCH:  dementia [ ] depression [ ] anxiety[ ]                                                                                                               NEGATIVE[X ]   HEME/LYMPH:  bruises easily[ ] enlarged lymph nodes[ ] tender lymph nodes[ ]                                               NEGATIVE[ X]   ENDOCRINE:  cold intolerance[ ] heat intolerance[ ] polydipsia[ ]                                                                          NEGATIVE[ X]

## 2022-05-04 NOTE — H&P ADULT - NSHPPHYSICALEXAM_GEN_ALL_CORE
T(C): 35.9 (03 May 2022 15:47), Max: 35.9 (03 May 2022 15:47)  T(F): 96.7 (03 May 2022 15:47), Max: 96.7 (03 May 2022 15:47)  HR: 95 (03 May 2022 15:47) (95 - 95)  BP: 141/74 (03 May 2022 15:47) (141/74 - 141/74)  BP(mean): --  RR: 20 (03 May 2022 15:47) (20 - 20)  SpO2: 98% (03 May 2022 15:47) (98% - 98%)

## 2022-05-04 NOTE — DISCHARGE NOTE PROVIDER - HOSPITAL COURSE
Ms. DANA LOPEZ 81 year F, never smoker with PMH of CAD S/P PCI X3 2007, 2009, 2015, HTN, GERD, Hysterectomy ?ovarian ca, TKR 1/2022. Symptoms include SOB with stairs or activity, chest pressure when lying down- 3-4 Months, fatigue. NYHA class III.  Echocardiogram showed severe Aortic Stenosis. On 05/04/22, she presented for Elective TAVR. Postoperatively,            Ms. DANA LOPEZ 81 year F, never smoker with PMH of CAD S/P PCI X3 2007, 2009, 2015, HTN, GERD, Hysterectomy ?ovarian ca, TKR 1/2022. Symptoms include SOB with stairs or activity, chest pressure when lying down- 3-4 Months, fatigue. NYHA class III.  Echocardiogram showed severe Aortic Stenosis. On 05/04/22, she presented for Elective TAVR. Postoperatively the patient had an uncomplicated course and was discharged home in stable condition on POD# 1 with a follow-up appointment with Dr. Ibanez scheduled for Thursday 5/12/2022 at 2:00pm.

## 2022-05-04 NOTE — DISCHARGE NOTE PROVIDER - NSDCFUADDINST_GEN_ALL_CORE_FT
Activities/Restrictions:  1.	Do not – drive, lift, pull or push anything over 10 pounds for 4 weeks.  2.	Shower every night and carefully wash wound / groin, pat dry.  Cover if wound is draining with dry sterile dressing. No baths or swimming for 2 weeks.   3.	Apply support stockings /ace wraps to legs as soon as you get out of bed in the morning, remove in evening.   4.	Do progressive walking exercises every day, gradually increasing to 10 to 20 minutes/day, five days a week and incentive spirometer 10 times every hour while awake.  5.	DO NOT DRIVE OR CONSUME ALCOHOL WHILE TAKING PAIN MEDICATION.  Contact your Physician promptly if:  1.	 Signs of wound / groin infection, such as increasing redness, swelling, pain or drainage from incision / groin puncture sites.  2.	Progressive shortness of breath or increasing difficulty with breathing when lying down.  3.	Excessive nausea, vomiting, diarrhea or coughing.  4.	Increase swelling of legs that does not resolve with leg elevation.  5.	Chest pain that spreads to arms, jaw or back or sudden development of numbness, weakness, difficulty speaking or facial droop – Call 911.  6.	Diabetics who are unable to keep finger stick glucose under 150 for three consecutive readings.  Instructions:  1.	 Keep a daily log for Temperature, pulse, blood pressure, and weight twice a day and Glucose if diabetic with each meal. Call office for Temp > 101, pulse greater than 110 or less than 55, BP first # greater than 160 or less than 100, 3 pound weight gain in 1 day or 5 pounds in 3 days.

## 2022-05-04 NOTE — DISCHARGE NOTE PROVIDER - CARE PROVIDERS DIRECT ADDRESSES
,DirectAddress_Unknown ,DirectAddress_Unknown,[email or web address]

## 2022-05-04 NOTE — DISCHARGE NOTE PROVIDER - NSDCFUSCHEDAPPT_GEN_ALL_CORE_FT
Andriy Lux  [facility] Physician [facility]  Cardio [address] [facility]  Scheduled Appointment: 06/17/2022     Andriy Lux  [facility]  CARDIOLOGY [address]  Scheduled Appointment: 05/12/2022    Fide Mercedes  [facility]  CARDIOLOGY [address]  Scheduled Appointment: 06/14/2022    Andriy Lux  [facility]  Cardio [address]  Scheduled Appointment: 06/17/2022

## 2022-05-04 NOTE — DISCHARGE NOTE PROVIDER - NSDCMRMEDTOKEN_GEN_ALL_CORE_FT
amlodipine-benazepril 5 mg-20 mg oral capsule: 1 cap(s) orally once a day  aspirin 81 mg oral tablet, chewable: 1 tab(s) orally once a day  Macrobid 100 mg oral capsule: 1 cap(s) orally 2 times a day   pravastatin 10 mg oral tablet: 1 tab(s) orally once a day (at bedtime)  Protonix 40 mg oral delayed release tablet: 1 tab(s) orally once a day   amlodipine-benazepril 5 mg-20 mg oral capsule: 1 cap(s) orally once a day  aspirin 81 mg oral tablet, chewable: 1 tab(s) orally once a day  clopidogrel 75 mg oral tablet: 1 tab(s) orally once a day  Macrobid 100 mg oral capsule: 1 cap(s) orally 2 times a day   pravastatin 10 mg oral tablet: 1 tab(s) orally once a day (at bedtime)  Protonix 40 mg oral delayed release tablet: 1 tab(s) orally once a day

## 2022-05-04 NOTE — DISCHARGE NOTE PROVIDER - PROVIDER TOKENS
FREE:[LAST:[[facility]],PHONE:[phone]],FAX:[(   )    -],ADDRESS:[[address] [place] [address]],FOLLOWUP:[1 week]] FREE:[LAST:[[facility]],PHONE:[phone]],FAX:[(   )    -],ADDRESS:[[address] [place] [address]],FOLLOWUP:[1 week]],PROVIDER:[TOKEN:[[id number]],SCHEDULEDAPPT:[05/12/2022],SCHEDULEDAPPTTIME:[02:00 PM]]

## 2022-05-04 NOTE — PRE-ANESTHESIA EVALUATION ADULT - NSRADCARDRESULTSFT_GEN_ALL_CORE
TTE 5/28/2018  Summary:   1. Normal global left ventricular systolic function.   2. Mild mitral annular calcification.   3. Mild-moderate tricuspid regurgitation.   4. Mild aortic regurgitation.   5. Sclerotic aortic valve with decreased opening.   6. Peak AV grad 31 with a mean of 15 and Mild AS.   7. Pulmonic valve regurgitation.

## 2022-05-04 NOTE — H&P ADULT - HISTORY OF PRESENT ILLNESS
Ms. DANA LOPEZ 81 year F, never smoker with PMH of CAD S/P PCI X3 2007, 2009, 2015, HTN, GERD, Hysterectomy ?ovarian ca, TKR 1/2022. Symptoms include SOB with stairs or activity, chest pressure when lying down- 3-4 Months, fatigue. NYHA class III.  Echocardiogram showed severe Aortic Stenosis. On 05/04/22, she presented for Elective TAVR today.     Allergies    No Known Allergies    Intolerances        FAMILY HISTORY:  No pertinent family history in first degree relatives            PHYSICAL EXAM  Vital Signs Last 24 Hrs    Right arm bp:                                 Left arm bp;    CONSTITUTIONAL:  WNL[ ]   Neuro: WNL [ ] Normal exam oriented to person/place & time with no focal motor or sensory  deficits. Other                     Eyes:    WNL [ ] Normal exam of conjunctiva & lids, pupils equally reactive. Other     ENT:     WNL [ ] Normal exam of nasal/oral mucosa with absence of cyanosis. Other  Neck:   WNL [ ] Normal exam of jugular veins, trachea & thyroid. Other  Chest:  WNL [ ] Normal lung exam with good air movement absence of wheezes, rales, or rhonchi: Other                                                                                CV:  Auscultation: normal [ ] S3[ ] S4[ ] Irregular [ ] Rub[ ] Clicks[ ]    Murmurs none:[ ]systolic [ ]  diastolic [ ] holosystolic [ ]  Carotids: No Bruits[ ] Other                  Abdominal Aorta: normal [ ] nonpalpable[ ]Other                                                                                      GI: WNL[ ] Normal exam of abdomen, liver & spleen with no noted masses or tenderness. Other                                                                                                        Extremities: WNL[ ] Normal no evidence of cyanosis or deformity Edema: none[ ]trace[ ]1+[ ]2+[ ]3+[ ]4+[ ]  Lower Extremity Pulses: Right[ ] Left[ ]Varicosities[ ]  SKIN :WNL[ ] Normal exam to inspection & palpation. Other:                                                          LABS:                      COVID Result:     Cardiac Cath:    TTE / SATYA:    STS Score:     Impression:  CAD [ ]  Valvular  disease [ ]   Aortic Disease [ ]   CESAR: Yes[ ] No [ ]   CKD Stage I [ ] , Stage II [ ] , Stage III [ ], Stage IV [ ]   Anemia: Yes [ ], No [ ]  Diabetes :Yes [ ], No [ ]  Acute MI: Yes [ ], No [ ]   Heart Failure: Yes [ ] , No [ ] HFpEF [ ], HFrEF [ ]      Assessment/ Plan: 81y Female with...  -Case and plan discussed with CT surgeon /Luli/Fallon. Initial STS risk assessed and discussed with patient. Evaluation by full heart team pending. Attending note to follow. Pre-op for:     Recommendations:  [] hold Plavix  [] hold ASA if Pre-op Cardiac Valve surgery and patient without CAD  [] hold ACEI/ARB/CCB 24 hours prior to planned procedure   [] LUE/RUE precaution for possible radial artery harvest      Labs:  [] CBC  [] CMP  [] PT/INR/PTT  [] BNP  [] HgA1c  [] Type and screen  [] Urinalysis  [] MRSA  [] COVID pcr    Diagnostic studies  [] CT HEAD Non-Contrast  [] CT Chest without/with contrast   [] Carotid Duplex  [] PFT: Simple PFT [ ]  Full [ ]  [] SUKI/PVR  [] TTE    Consultations/Evaluations   [] Renal Consult  [] Pulmonary Consult  [] Vascular Consult  [] Dental Consult   [] Hem-Onc Consult   [] GI Consult   [] Other Consultations :

## 2022-05-04 NOTE — BRIEF OPERATIVE NOTE - NSICDXBRIEFPROCEDURE_GEN_ALL_CORE_FT
PROCEDURES:  TAVR, percutaneous femoral approach, using prosthetic valve 04-May-2022 13:21:44 Utilizing 26mm Azevedo transcatheter pericardial tissue heart valve, serial # [id number], Model # [id number] Prabhjot Martinez

## 2022-05-04 NOTE — DISCHARGE NOTE PROVIDER - CARE PROVIDER_API CALL
[facility],   [address] [place], [place] [address]  [address]  [facility]  Phone: [phone]  Fax: (   )    -  Follow Up Time: 1 week   [facility],   [address] [place] [address]  Phone: [phone]  Fax: (   )    -  Follow Up Time: 1 week    Val Ibanez)  Thoracic and Cardiac Surgery  [address] [place] [address], [place] [address]  Phone: [phone]  Fax: [phone]  Scheduled Appointment: 05/12/2022 02:00 PM

## 2022-05-05 ENCOUNTER — TRANSCRIPTION ENCOUNTER (OUTPATIENT)
Age: 82
End: 2022-05-05

## 2022-05-05 VITALS
TEMPERATURE: 97 F | HEART RATE: 94 BPM | SYSTOLIC BLOOD PRESSURE: 129 MMHG | RESPIRATION RATE: 33 BRPM | DIASTOLIC BLOOD PRESSURE: 80 MMHG | OXYGEN SATURATION: 98 %

## 2022-05-05 LAB
ALBUMIN SERPL ELPH-MCNC: 4.1 G/DL — SIGNIFICANT CHANGE UP (ref 3.5–5.2)
ALP SERPL-CCNC: 56 U/L — SIGNIFICANT CHANGE UP (ref 30–115)
ALT FLD-CCNC: 6 U/L — SIGNIFICANT CHANGE UP (ref 0–41)
ANION GAP SERPL CALC-SCNC: 11 MMOL/L — SIGNIFICANT CHANGE UP (ref 7–14)
AST SERPL-CCNC: 10 U/L — SIGNIFICANT CHANGE UP (ref 0–41)
BASOPHILS # BLD AUTO: 0.03 K/UL — SIGNIFICANT CHANGE UP (ref 0–0.2)
BASOPHILS NFR BLD AUTO: 0.5 % — SIGNIFICANT CHANGE UP (ref 0–1)
BILIRUB SERPL-MCNC: 0.6 MG/DL — SIGNIFICANT CHANGE UP (ref 0.2–1.2)
BUN SERPL-MCNC: 14 MG/DL — SIGNIFICANT CHANGE UP (ref 10–20)
CALCIUM SERPL-MCNC: 8.9 MG/DL — SIGNIFICANT CHANGE UP (ref 8.5–10.1)
CHLORIDE SERPL-SCNC: 103 MMOL/L — SIGNIFICANT CHANGE UP (ref 98–110)
CO2 SERPL-SCNC: 21 MMOL/L — SIGNIFICANT CHANGE UP (ref 17–32)
CREAT SERPL-MCNC: 0.6 MG/DL — LOW (ref 0.7–1.5)
EGFR: 91 ML/MIN/1.73M2 — SIGNIFICANT CHANGE UP
EOSINOPHIL # BLD AUTO: 0.16 K/UL — SIGNIFICANT CHANGE UP (ref 0–0.7)
EOSINOPHIL NFR BLD AUTO: 2.4 % — SIGNIFICANT CHANGE UP (ref 0–8)
GLUCOSE SERPL-MCNC: 93 MG/DL — SIGNIFICANT CHANGE UP (ref 70–99)
HCT VFR BLD CALC: 32.6 % — LOW (ref 37–47)
HGB BLD-MCNC: 11.1 G/DL — LOW (ref 12–16)
IMM GRANULOCYTES NFR BLD AUTO: 0.2 % — SIGNIFICANT CHANGE UP (ref 0.1–0.3)
LYMPHOCYTES # BLD AUTO: 1.45 K/UL — SIGNIFICANT CHANGE UP (ref 1.2–3.4)
LYMPHOCYTES # BLD AUTO: 22.2 % — SIGNIFICANT CHANGE UP (ref 20.5–51.1)
MAGNESIUM SERPL-MCNC: 2.5 MG/DL — HIGH (ref 1.8–2.4)
MCHC RBC-ENTMCNC: 32 PG — HIGH (ref 27–31)
MCHC RBC-ENTMCNC: 34 G/DL — SIGNIFICANT CHANGE UP (ref 32–37)
MCV RBC AUTO: 93.9 FL — SIGNIFICANT CHANGE UP (ref 81–99)
MONOCYTES # BLD AUTO: 0.68 K/UL — HIGH (ref 0.1–0.6)
MONOCYTES NFR BLD AUTO: 10.4 % — HIGH (ref 1.7–9.3)
NEUTROPHILS # BLD AUTO: 4.21 K/UL — SIGNIFICANT CHANGE UP (ref 1.4–6.5)
NEUTROPHILS NFR BLD AUTO: 64.3 % — SIGNIFICANT CHANGE UP (ref 42.2–75.2)
NRBC # BLD: 0 /100 WBCS — SIGNIFICANT CHANGE UP (ref 0–0)
PLATELET # BLD AUTO: 214 K/UL — SIGNIFICANT CHANGE UP (ref 130–400)
POTASSIUM SERPL-MCNC: 3.8 MMOL/L — SIGNIFICANT CHANGE UP (ref 3.5–5)
POTASSIUM SERPL-SCNC: 3.8 MMOL/L — SIGNIFICANT CHANGE UP (ref 3.5–5)
PROT SERPL-MCNC: 5.8 G/DL — LOW (ref 6–8)
RBC # BLD: 3.47 M/UL — LOW (ref 4.2–5.4)
RBC # FLD: 14.2 % — SIGNIFICANT CHANGE UP (ref 11.5–14.5)
SODIUM SERPL-SCNC: 135 MMOL/L — SIGNIFICANT CHANGE UP (ref 135–146)
WBC # BLD: 6.54 K/UL — SIGNIFICANT CHANGE UP (ref 4.8–10.8)
WBC # FLD AUTO: 6.54 K/UL — SIGNIFICANT CHANGE UP (ref 4.8–10.8)

## 2022-05-05 PROCEDURE — 99238 HOSP IP/OBS DSCHRG MGMT 30/<: CPT

## 2022-05-05 PROCEDURE — 99233 SBSQ HOSP IP/OBS HIGH 50: CPT

## 2022-05-05 PROCEDURE — 71045 X-RAY EXAM CHEST 1 VIEW: CPT | Mod: 26

## 2022-05-05 PROCEDURE — 93010 ELECTROCARDIOGRAM REPORT: CPT

## 2022-05-05 PROCEDURE — 93306 TTE W/DOPPLER COMPLETE: CPT | Mod: 26

## 2022-05-05 RX ORDER — CLOPIDOGREL BISULFATE 75 MG/1
1 TABLET, FILM COATED ORAL
Qty: 90 | Refills: 0
Start: 2022-05-05 | End: 2022-08-02

## 2022-05-05 RX ORDER — AMLODIPINE BESYLATE 2.5 MG/1
5 TABLET ORAL DAILY
Refills: 0 | Status: DISCONTINUED | OUTPATIENT
Start: 2022-05-05 | End: 2022-05-05

## 2022-05-05 RX ORDER — POTASSIUM CHLORIDE 20 MEQ
20 PACKET (EA) ORAL ONCE
Refills: 0 | Status: COMPLETED | OUTPATIENT
Start: 2022-05-05 | End: 2022-05-05

## 2022-05-05 RX ADMIN — Medication 100 MILLIGRAM(S): at 05:48

## 2022-05-05 RX ADMIN — CLOPIDOGREL BISULFATE 75 MILLIGRAM(S): 75 TABLET, FILM COATED ORAL at 12:09

## 2022-05-05 RX ADMIN — AMLODIPINE BESYLATE 5 MILLIGRAM(S): 2.5 TABLET ORAL at 06:46

## 2022-05-05 RX ADMIN — Medication 1000 MILLIGRAM(S): at 01:05

## 2022-05-05 RX ADMIN — Medication 100 MILLIGRAM(S): at 05:50

## 2022-05-05 RX ADMIN — Medication 81 MILLIGRAM(S): at 12:09

## 2022-05-05 RX ADMIN — Medication 400 MILLIGRAM(S): at 00:48

## 2022-05-05 RX ADMIN — PANTOPRAZOLE SODIUM 40 MILLIGRAM(S): 20 TABLET, DELAYED RELEASE ORAL at 06:08

## 2022-05-05 RX ADMIN — Medication 20 MILLIEQUIVALENT(S): at 06:21

## 2022-05-05 RX ADMIN — Medication 100 MILLIGRAM(S): at 17:34

## 2022-05-05 NOTE — DISCHARGE NOTE NURSING/CASE MANAGEMENT/SOCIAL WORK - PATIENT PORTAL LINK FT
You can access the FollowMyHealth Patient Portal offered by [facility] by registering at the following website: [email or web address]. By joining [facility]’s FollowMyHealth portal, you will also be able to view your health information using other applications (apps) compatible with our system.

## 2022-05-05 NOTE — PHYSICAL THERAPY INITIAL EVALUATION ADULT - GAIT DISTANCE, PT EVAL
Pt amb 60'x1 with RW with RN & 40'x1 with RW with PT & 100'x1 without AD Pt declined further 2* c/o mild SOB. SpO2 94%RA.

## 2022-05-05 NOTE — PROGRESS NOTE ADULT - SUBJECTIVE AND OBJECTIVE BOX
CTU Attending Progress Daily Note     05 May 2022 07:56  Admited 05-04-22, Hospital Day 1d  POD# -     HPI:  Ms. DANA OLPEZ 81 year F, never smoker with PMH of CAD S/P PCI X3 2007, 2009, 2015, HTN, GERD, Hysterectomy ?ovarian ca, TKR 1/2022. Symptoms include SOB with stairs or activity, chest pressure when lying down- 3-4 Months, fatigue. NYHA class III.  Echocardiogram showed severe Aortic Stenosis. On 05/04/22, she presented for Elective TAVR today.     Allergies    No Known Allergies    Intolerances        FAMILY HISTORY:  No pertinent family history in first degree relatives            PHYSICAL EXAM  Vital Signs Last 24 Hrs    Right arm bp:                                 Left arm bp;    CONSTITUTIONAL:  WNL[ ]   Neuro: WNL [ ] Normal exam oriented to person/place & time with no focal motor or sensory  deficits. Other                     Eyes:    WNL [ ] Normal exam of conjunctiva & lids, pupils equally reactive. Other     ENT:     WNL [ ] Normal exam of nasal/oral mucosa with absence of cyanosis. Other  Neck:   WNL [ ] Normal exam of jugular veins, trachea & thyroid. Other  Chest:  WNL [ ] Normal lung exam with good air movement absence of wheezes, rales, or rhonchi: Other                                                                                CV:  Auscultation: normal [ ] S3[ ] S4[ ] Irregular [ ] Rub[ ] Clicks[ ]    Murmurs none:[ ]systolic [ ]  diastolic [ ] holosystolic [ ]  Carotids: No Bruits[ ] Other                  Abdominal Aorta: normal [ ] nonpalpable[ ]Other                                                                                      GI: WNL[ ] Normal exam of abdomen, liver & spleen with no noted masses or tenderness. Other                                                                                                        Extremities: WNL[ ] Normal no evidence of cyanosis or deformity Edema: none[ ]trace[ ]1+[ ]2+[ ]3+[ ]4+[ ]  Lower Extremity Pulses: Right[ ] Left[ ]Varicosities[ ]  SKIN :WNL[ ] Normal exam to inspection & palpation. Other:                                                          LABS:                      COVID Result:     Cardiac Cath:    TTE / SATYA:    STS Score:     Impression:  CAD [ ]  Valvular  disease [ ]   Aortic Disease [ ]   CESAR: Yes[ ] No [ ]   CKD Stage I [ ] , Stage II [ ] , Stage III [ ], Stage IV [ ]   Anemia: Yes [ ], No [ ]  Diabetes :Yes [ ], No [ ]  Acute MI: Yes [ ], No [ ]   Heart Failure: Yes [ ] , No [ ] HFpEF [ ], HFrEF [ ]      Assessment/ Plan: 81y Female with...  -Case and plan discussed with CT surgeon /Luli/Fallon. Initial STS risk assessed and discussed with patient. Evaluation by full heart team pending. Attending note to follow. Pre-op for:     Recommendations:  [] hold Plavix  [] hold ASA if Pre-op Cardiac Valve surgery and patient without CAD  [] hold ACEI/ARB/CCB 24 hours prior to planned procedure   [] LUE/RUE precaution for possible radial artery harvest      Labs:  [] CBC  [] CMP  [] PT/INR/PTT  [] BNP  [] HgA1c  [] Type and screen  [] Urinalysis  [] MRSA  [] COVID pcr    Diagnostic studies  [] CT HEAD Non-Contrast  [] CT Chest without/with contrast   [] Carotid Duplex  [] PFT: Simple PFT [ ]  Full [ ]  [] SUKI/PVR  [] TTE    Consultations/Evaluations   [] Renal Consult  [] Pulmonary Consult  [] Vascular Consult  [] Dental Consult   [] Hem-Onc Consult   [] GI Consult   [] Other Consultations :                 (04 May 2022 10:52)    Home Medications:  amlodipine-benazepril 5 mg-20 mg oral capsule: 1 cap(s) orally once a day (04 May 2022 09:07)  aspirin 81 mg oral tablet, chewable: 1 tab(s) orally once a day (04 May 2022 09:07)  pravastatin 10 mg oral tablet: 1 tab(s) orally once a day (at bedtime) (04 May 2022 09:07)  Protonix 40 mg oral delayed release tablet: 1 tab(s) orally once a day (04 May 2022 09:07)    FAMILY HISTORY:  No pertinent family history in first degree relatives      PAST MEDICAL & SURGICAL HISTORY:  Hypertension    CAD (coronary artery disease)    GERD (gastroesophageal reflux disease)    Anxiety    H/O abdominal hysterectomy    H/O total knee replacement, right    S/P cholecystectomy      Interval event for past 24 hr:  DANA LOPEZ  81y had no event.   Current Complains:  DANA LOPEZ has no new complains  Allergies    No Known Allergies    Intolerances      OBJECTIVE:  Vitals last 24 hrs  T(C): 36.6 (05-05-22 @ 06:00), Max: 36.7 (05-04-22 @ 20:00)  T(F): 97.8 (05-05-22 @ 06:00), Max: 98.1 (05-04-22 @ 20:00)  HR: 84 (05-05-22 @ 07:00) (54 - 105)  BP: 108/59 (05-05-22 @ 07:00) (88/56 - 123/60)  ABP: 139/46 (05-05-22 @ 07:00) (111/39 - 144/53)  ABP(mean): 70 (05-05-22 @ 07:00) (56 - 82)  RR: 22 (05-05-22 @ 07:00) (12 - 38)  SpO2: 94% (05-05-22 @ 07:00) (94% - 100%)  CVP(mm Hg): --  CI: --  PA: --  PA(direct): --  PCWP: --  SVR: --  PVR: --    05-04-22 @ 07:01  -  05-05-22 @ 07:00  --------------------------------------------------------  IN:    IV PiggyBack: 350 mL    NiCARdipine: 100 mL    Nitroglycerin: 140 mL    Oral Fluid: 720 mL    sodium chloride 0.9%: 320 mL  Total IN: 1630 mL    OUT:    Indwelling Catheter - Urethral (mL): 1525 mL  Total OUT: 1525 mL    Total NET: 105 mL             CAPILLARY BLOOD GLUCOSE        LABS:  ABG - ( 05 May 2022 03:40 )  pH, Arterial: 7.42  pH, Blood: x     /  pCO2: 36    /  pO2: 101   / HCO3: 23    / Base Excess: -0.8  /  SaO2: 99.3            Blood Gas Arterial, Lactate: 0.50 mmol/L (05-05-22 @ 03:40)  Blood Gas Arterial, Lactate: 0.50 mmol/L (05-04-22 @ 14:38)                          11.1   6.54  )-----------( 214      ( 05 May 2022 01:10 )             32.6     Hemoglobin: 11.1 g/dL (05-05 @ 01:10)  Hemoglobin: 11.8 g/dL (05-04 @ 14:32)    05-05    135  |  103  |  14  ----------------------------<  93  3.8   |  21  |  0.6<L>    Ca    8.9      05 May 2022 01:10  Mg     2.5     05-05    TPro  5.8<L>  /  Alb  4.1  /  TBili  0.6  /  DBili  x   /  AST  10  /  ALT  6   /  AlkPhos  56  05-05    Creatinine, Serum: 0.6 mg/dL (05-05 @ 01:10)  Creatinine, Serum: 0.6 mg/dL (05-04 @ 14:32)    PT/INR - ( 04 May 2022 14:32 )   PT: 12.30 sec;   INR: 1.07 ratio         PTT - ( 04 May 2022 14:32 )  PTT:33.0 sec      HOSPITAL MEDICATIONS:  MEDICATIONS  (STANDING):  amLODIPine   Tablet 5 milliGRAM(s) Oral daily  aspirin  chewable 81 milliGRAM(s) Oral daily  chlorhexidine 0.12% Liquid 5 milliLiter(s) Oral Mucosa two times a day  clopidogrel Tablet 75 milliGRAM(s) Oral daily  dextrose 50% Injectable 50 milliLiter(s) IV Push every 15 minutes  dextrose 50% Injectable 25 milliLiter(s) IV Push every 15 minutes  insulin regular Infusion 10 Unit(s)/Hr (10 mL/Hr) IV Continuous <Continuous>  meperidine     Injectable 25 milliGRAM(s) IV Push once  niCARdipine Infusion 5 mG/Hr (25 mL/Hr) IV Continuous <Continuous>  nitrofurantoin monohydrate/macrocrystals (MACROBID) 100 milliGRAM(s) Oral two times a day  nitroglycerin  Infusion 30 MICROgram(s)/Min (9 mL/Hr) IV Continuous <Continuous>  norepinephrine Infusion 0.05 MICROgram(s)/kG/Min (6.29 mL/Hr) IV Continuous <Continuous>  pantoprazole    Tablet 40 milliGRAM(s) Oral before breakfast  polyethylene glycol 3350 17 Gram(s) Oral daily  sodium chloride 0.9%. 1000 milliLiter(s) (20 mL/Hr) IV Continuous <Continuous>  vasopressin Infusion 0.04 Unit(s)/Min (2.4 mL/Hr) IV Continuous <Continuous>    MEDICATIONS  (PRN):  oxyCODONE    IR 5 milliGRAM(s) Oral every 6 hours PRN Moderate Pain (4 - 6)      REVIEW OF SYSTEMS:  CONSTITUTIONAL: [X] all negative; [ ] weakness, [ ] fevers, [ ] chills  EYES/ENT: [X] all negative; [ ] visual changes, [ ] vertigo, [ ] throat pain, [ ] eye pain  NECK: [X] all negative; [ ] pain, [ ] stiffness  RESPIRATORY: [ ] all negative, [ ] cough, [ ] wheezing, [ ] hemoptysis, [ ] shortness of breath, [  ] chest pain  CARDIOVASCULAR: [X] all negative; [ ] anginal chest pain, [ ] palpitations, [ ] orthopnea  GASTROINTESTINAL: [X] all negative; [ ]abdominal pain, [ ] nausea, [ ] vomiting, [ ] hematemesis, [ ] diarrhea, [ ] constipation, [ ] melena, [ ] hematochezia.  GENITOURINARY: [X] all negative; [ ] dysuria, [ ] frequency, [ ] hematuria  NEUROLOGICAL: [X] all negative; [ ] numbness, [ ] weakness, [ ] paresthesias  MUSCULOSKELETAL: [X] all negative, [ ] joint pain, [ ] joint swelling, [ ] joint redness, [ ] bone pain  SKIN: [X] all negative; [ ] itching, [ ] burning, [ ] rashes, [ ] lesions   All other review of systems is negative unless indicated above.    [  ] Unable to assess ROS because     PHYSICAL EXAM:          CONSTITUTIONAL: Well-developed; well-nourished; in no acute distress.   	SKIN: warm, dry, no rashes or lesions  	HEENT: Atraumatic. Normocephalic. PERRL. Moist membranes, no conjunctival injection, sclera clear  	NECK: Supple; non tender.  No JVD. No lymphadenopathy.  	CARD: Normal S1, S2. Rate and Rhythm are regular. No murmurs.  	RESP: Good air entry bilaterally, no wheezes, no rales no rhonchi.  	ABD: Soft, not tender, not distended, no CVA tenderness, no rebound no guarding, bowel sounds present  	EXT: Normal ROM.  No clubbing, no cyanosis, no pedal edema, no calf pain b/l, Peripheral pulses intact.  	LYMPH: No acute adenopathy.  	NEURO: Alert, awake, motor 5/5 R, 5/5 L, sensation intact bilat, CN 2-12 intact,          PSYCH: Cooperative, appropriate. Alert & oriented x 3    RADIOLOGY:  X Reviewed and interpreted by me  CxR from 05-05-22 shows mild congestion, no pneumothorax, no effusion, no cardiomegaly,   ETT above marco in good position, NGT in place, TLC in place, S-G Catheter in place, Chest Tubes in place,     ECG:  X Reviewed and interpreted by me CTU Attending Progress Daily Note     05 May 2022 07:56  Admited 05-04-22, Hospital Day 1d  POD# - 1    HPI:  Ms. DANA LOPEZ 81 year F, never smoker with PMH of CAD S/P PCI X3 2007, 2009, 2015, HTN, GERD, Hysterectomy ?ovarian ca, TKR 1/2022. Symptoms include SOB with stairs or activity, chest pressure when lying down- 3-4 Months, fatigue. NYHA class III.  Echocardiogram showed severe Aortic Stenosis. On 05/04/22, she presented for Elective TAVR today.     Home Medications:  amlodipine-benazepril 5 mg-20 mg oral capsule: 1 cap(s) orally once a day (04 May 2022 09:07)  aspirin 81 mg oral tablet, chewable: 1 tab(s) orally once a day (04 May 2022 09:07)  pravastatin 10 mg oral tablet: 1 tab(s) orally once a day (at bedtime) (04 May 2022 09:07)  Protonix 40 mg oral delayed release tablet: 1 tab(s) orally once a day (04 May 2022 09:07)    FAMILY HISTORY:  No pertinent family history in first degree relatives      PAST MEDICAL & SURGICAL HISTORY:  Hypertension    CAD (coronary artery disease)    GERD (gastroesophageal reflux disease)    Anxiety    H/O abdominal hysterectomy    H/O total knee replacement, right    S/P cholecystectomy      Interval event for past 24 hr:  DANA LOPEZ  81y had no event.   Current Complains:  DANA LOPEZ has no new complains  Allergies    No Known Allergies    Intolerances      OBJECTIVE:  Vitals last 24 hrs  T(C): 36.6 (05-05-22 @ 06:00), Max: 36.7 (05-04-22 @ 20:00)  T(F): 97.8 (05-05-22 @ 06:00), Max: 98.1 (05-04-22 @ 20:00)  HR: 84 (05-05-22 @ 07:00) (54 - 105)  BP: 108/59 (05-05-22 @ 07:00) (88/56 - 123/60)  ABP: 139/46 (05-05-22 @ 07:00) (111/39 - 144/53)  ABP(mean): 70 (05-05-22 @ 07:00) (56 - 82)  RR: 22 (05-05-22 @ 07:00) (12 - 38)  SpO2: 94% (05-05-22 @ 07:00) (94% - 100%)      05-04-22 @ 07:01  -  05-05-22 @ 07:00  --------------------------------------------------------  IN:    IV PiggyBack: 350 mL    NiCARdipine: 100 mL    Nitroglycerin: 140 mL    Oral Fluid: 720 mL    sodium chloride 0.9%: 320 mL  Total IN: 1630 mL    OUT:    Indwelling Catheter - Urethral (mL): 1525 mL  Total OUT: 1525 mL    Total NET: 105 mL             CAPILLARY BLOOD GLUCOSE        LABS:  ABG - ( 05 May 2022 03:40 )  pH, Arterial: 7.42  pH, Blood: x     /  pCO2: 36    /  pO2: 101   / HCO3: 23    / Base Excess: -0.8  /  SaO2: 99.3            Blood Gas Arterial, Lactate: 0.50 mmol/L (05-05-22 @ 03:40)  Blood Gas Arterial, Lactate: 0.50 mmol/L (05-04-22 @ 14:38)                          11.1   6.54  )-----------( 214      ( 05 May 2022 01:10 )             32.6     Hemoglobin: 11.1 g/dL (05-05 @ 01:10)  Hemoglobin: 11.8 g/dL (05-04 @ 14:32)    05-05    135  |  103  |  14  ----------------------------<  93  3.8   |  21  |  0.6<L>    Ca    8.9      05 May 2022 01:10  Mg     2.5     05-05    TPro  5.8<L>  /  Alb  4.1  /  TBili  0.6  /  DBili  x   /  AST  10  /  ALT  6   /  AlkPhos  56  05-05    Creatinine, Serum: 0.6 mg/dL (05-05 @ 01:10)  Creatinine, Serum: 0.6 mg/dL (05-04 @ 14:32)    PT/INR - ( 04 May 2022 14:32 )   PT: 12.30 sec;   INR: 1.07 ratio         PTT - ( 04 May 2022 14:32 )  PTT:33.0 sec      HOSPITAL MEDICATIONS:  MEDICATIONS  (STANDING):  amLODIPine   Tablet 5 milliGRAM(s) Oral daily  aspirin  chewable 81 milliGRAM(s) Oral daily  chlorhexidine 0.12% Liquid 5 milliLiter(s) Oral Mucosa two times a day  clopidogrel Tablet 75 milliGRAM(s) Oral daily  dextrose 50% Injectable 50 milliLiter(s) IV Push every 15 minutes  dextrose 50% Injectable 25 milliLiter(s) IV Push every 15 minutes  insulin regular Infusion 10 Unit(s)/Hr (10 mL/Hr) IV Continuous <Continuous>  meperidine     Injectable 25 milliGRAM(s) IV Push once  niCARdipine Infusion 5 mG/Hr (25 mL/Hr) IV Continuous <Continuous>  nitrofurantoin monohydrate/macrocrystals (MACROBID) 100 milliGRAM(s) Oral two times a day  nitroglycerin  Infusion 30 MICROgram(s)/Min (9 mL/Hr) IV Continuous <Continuous>  norepinephrine Infusion 0.05 MICROgram(s)/kG/Min (6.29 mL/Hr) IV Continuous <Continuous>  pantoprazole    Tablet 40 milliGRAM(s) Oral before breakfast  polyethylene glycol 3350 17 Gram(s) Oral daily  sodium chloride 0.9%. 1000 milliLiter(s) (20 mL/Hr) IV Continuous <Continuous>  vasopressin Infusion 0.04 Unit(s)/Min (2.4 mL/Hr) IV Continuous <Continuous>    MEDICATIONS  (PRN):  oxyCODONE    IR 5 milliGRAM(s) Oral every 6 hours PRN Moderate Pain (4 - 6)      REVIEW OF SYSTEMS:  CONSTITUTIONAL: [X] all negative; [ ] weakness, [ ] fevers, [ ] chills  EYES/ENT: [X] all negative; [ ] visual changes, [ ] vertigo, [ ] throat pain, [ ] eye pain  NECK: [X] all negative; [ ] pain, [ ] stiffness  RESPIRATORY: [x ] all negative, [ ] cough, [ ] wheezing, [ ] hemoptysis, [ ] shortness of breath, [  ] chest pain  CARDIOVASCULAR: [X] all negative; [ ] anginal chest pain, [ ] palpitations, [ ] orthopnea  GASTROINTESTINAL: [X] all negative; [ ]abdominal pain, [ ] nausea, [ ] vomiting, [ ] hematemesis, [ ] diarrhea, [ ] constipation, [ ] melena, [ ] hematochezia.  GENITOURINARY: [X] all negative; [ ] dysuria, [ ] frequency, [ ] hematuria  NEUROLOGICAL: [X] all negative; [ ] numbness, [ ] weakness, [ ] paresthesias  MUSCULOSKELETAL: [X] all negative, [ ] joint pain, [ ] joint swelling, [ ] joint redness, [ ] bone pain  SKIN: [X] all negative; [ ] itching, [ ] burning, [ ] rashes, [ ] lesions   All other review of systems is negative unless indicated above.    [  ] Unable to assess ROS because     PHYSICAL EXAM:          CONSTITUTIONAL: Well-developed; well-nourished; in no acute distress.   	SKIN: warm, dry, no rashes or lesions  	HEENT: Atraumatic. Normocephalic. PERRL. Moist membranes, no conjunctival injection, sclera clear  	NECK: Supple; non tender.  No JVD. No lymphadenopathy.  	CARD: Normal S1, S2. Rate and Rhythm are regular. No murmurs.  	RESP: Good air entry bilaterally, no wheezes, no rales no rhonchi.  	ABD: Soft, not tender, not distended, no CVA tenderness, no rebound no guarding, bowel sounds present  	EXT: Normal ROM.  No clubbing, no cyanosis, no pedal edema, no calf pain b/l, Peripheral pulses intact. groins look good - no hematomas  	LYMPH: No acute adenopathy.  	NEURO: Alert, awake, motor 5/5 R, 5/5 L, sensation intact bilat, CN 2-12 intact,          PSYCH: Cooperative, appropriate. Alert & oriented x 3    RADIOLOGY:  X Reviewed and interpreted by me  CxR from 05-05-22 shows mild congestion, no pneumothorax, no effusion, no cardiomegaly,       ECG:  X Reviewed and interpreted by me NSR 88, QTc - 428

## 2022-05-05 NOTE — PROGRESS NOTE ADULT - ASSESSMENT
PROBLEMS:  I spent 45 minutes of critical care time examining patient, reviewing vitals, labs, medications, imaging and discussing with the team goals of care to prevent life-threatening in this patient who is at high risk for deterioration or death due to:      AS - s/p TAVR, ASA 81, Plavix 75, ECHO today, d/c [place] and A. Line  HTN - on amlodipine, can add lisinopril if elevated BP  Mild Anemia - post procedure - probably dilutional - will follow      Case and plan discussed with CT Surgeons and CTU PAs.  Continue CTU supportive care and ongoing plan of care as per continuing CTU rounds.   Continue DVT/GI prophylaxis.  Incentive Spirometry 10 times an hour.  Continue to advance physical activity as tolerated and continue PT/OT as directed.    PLAN  Neuro: move all 4 extremities. no sensory or motor deficits  Pain management.     Pulm: Wean off supplemental oxygen as able. Daily CXR. Encourage coughing, deep breathing and use of incentive spirometry.     Cardio: Monitor telemetry/alarms. Continue supportive care   amLODIPine   Tablet 5 milliGRAM(s) Oral daily    GI: Continue stool softeners.    pantoprazole    Tablet 40 milliGRAM(s) Oral before breakfast    Nutrition: Continue present diet  Endocrine and glucose control:     Renal: monitor urine output, supplement electrolytes as needed,     Vasc: Heparin SC and/or SCDs for DVT prophylaxis  aspirin  chewable 81 milliGRAM(s) Oral daily    ID: Stable, no fever , no chills. Off antibiotics.  nitrofurantoin monohydrate/macrocrystals (MACROBID) 100 milliGRAM(s) Oral two times a day    Therapy: OOB/ambulate  Disposition: start planing discharge home or placement    Pertinent clinical, laboratory, radiographic, hemodynamic, echocardiographic, respiratory data, microbiologic data and chart were reviewed and analyzed frequently throughout the course of the day and night. GI and DVT prophylaxis, glycemic control, head of bed elevation and skin care issues were addressed.  Patient seen, examined and plan discussed with CT Surgery / CTICU team during rounds.    [ ] The patient remains in critical and unstable condition, and requires ICU care and monitoring  [ ] The patient is improving but requires continued monitoring and adjustment of therapy

## 2022-05-05 NOTE — PHYSICAL THERAPY INITIAL EVALUATION ADULT - GENERAL OBSERVATIONS, REHAB EVAL
10:08-10:21Pt encountered walking in hallway with YANE Cam with RW, tele, IV lock, in NAD. Pt without c/o, agreeable to PT eval. Pt left sitting in b/s chair with tele, IV lock, in NAD. BP: 134/61, HR: 93bpm, SpO2 97%RA YANE Cam aware.

## 2022-05-05 NOTE — PROGRESS NOTE ADULT - SUBJECTIVE AND OBJECTIVE BOX
OPERATIVE PROCEDURE(s):                POD #                       81yFemale  SURGEON(s): KEELY Ibanez  SUBJECTIVE ASSESSMENT:  Patient has no complaints at this time.    Vital Signs Last 24 Hrs  T(F): 97.8 (05 May 2022 06:00), Max: 98.1 (04 May 2022 20:00)  HR: 91 (05 May 2022 08:00) (54 - 105) SR  BP: 115/75 (05 May 2022 08:00) (88/56 - 123/60)  BP(mean): 89 (05 May 2022 08:00) (66 - 91)  ABP: 125/39 (05 May 2022 08:00) (111/39 - 144/53)  ABP(mean): 63 (05 May 2022 08:00)  RR: 32 (05 May 2022 08:00) (12 - 38)  SpO2: 95% (05 May 2022 08:00) (94% - 100%) RA  CVP(mm Hg): --  CVP(cm H2O): --  CO: --  CI: --  PA: --  SVR: --    I&O's Detail    04 May 2022 07:01  -  05 May 2022 07:00  --------------------------------------------------------  IN:    IV PiggyBack: 350 mL    NiCARdipine: 100 mL    Nitroglycerin: 140 mL    Oral Fluid: 720 mL    sodium chloride 0.9%: 320 mL  Total IN: 1630 mL    OUT:    Indwelling Catheter - Urethral (mL): 1525 mL  Total OUT: 1525 mL        Net:   I&O's Detail    04 May 2022 07:01  -  05 May 2022 07:00  --------------------------------------------------------  Total NET: 105 mL        CAPILLARY BLOOD GLUCOSE        Physical Exam:  General: NAD; A&Ox3/Patient is intubated and sedated  Cardiac: S1/S2, RRR, no murmur, no rubs  Lungs: unlabored respirations, CTA b/l, no wheeze, no rales, no crackles  Abdomen: Soft/NT/ND; positive bowel sounds x 4  Sternum: Intact, no click, incision healing well with no drainage  Incisions: Incisions clean/dry/intact  Extremities: No edema b/l lower extremities; good capillary refill; no cyanosis; palpable 1+ pedal pulses b/l    Central Venous Catheter: Yes[]  No[] , If Yes indication:           Day #  Washburn Catheter: Yes  [] , No  [] , If yes indication:                      Day #  NGT: Yes [] No [] ,    If Yes Placement:                                     Day #  EPICARDIAL WIRES:  [] YES [] NO                                              Day #  BOWEL MOVEMENT:  [] YES [] NO, If No, Timing since last BM:      Day #  CHEST TUBE(Left/Right):  [] YES [] NO, If yes -  AIR LEAKS:  [] YES [] NO        LABS:                        11.1<L>  6.54  )-----------( 214      ( 05 May 2022 01:10 )             32.6<L>                        11.8<L>  4.81  )-----------( 209      ( 04 May 2022 14:32 )             34.8<L>    05-05    135  |  103  |  14  ----------------------------<  93  3.8   |  21  |  0.6<L>  05-04    140  |  106  |  15  ----------------------------<  107<H>  4.1   |  21  |  0.6<L>    Ca    8.9      05 May 2022 01:10  Mg     2.5     05-05    TPro  5.8<L> [6.0 - 8.0]  /  Alb  4.1 [3.5 - 5.2]  /  TBili  0.6 [0.2 - 1.2]  /  DBili  x   /  AST  10 [0 - 41]  /  ALT  6 [0 - 41]  /  AlkPhos  56 [30 - 115]  05-05    PT/INR - ( 04 May 2022 14:32 )   PT: ;   INR: 1.07 ratio         PTT - ( 04 May 2022 14:32 )  PTT:33.0 sec    ABG - ( 05 May 2022 03:40 )  pH: 7.42  /  pCO2: 36    /  pO2: 101   / HCO3: 23    / Base Excess: -0.8  /  SaO2: 99.3  /  LA: 0.50             RADIOLOGY & ADDITIONAL TESTS:  CXR: Xray Chest 1 View- PORTABLE-Routine:   ACC: [id number] EXAM:  XR CHEST PORTABLE ROUTINE 1V                          PROCEDURE DATE:  2022          INTERPRETATION:  Clinical History / Reason for exam: Post TAVR    Comparison : Chest radiograph May 4, 2022.    Technique/Positioning: Single frontal chest x-ray obtained.    Findings:    Support devices: None.    Cardiac/mediastinum/hilum: Unchanged. Post TAVR.    Lung parenchyma/Pleura: Within normal limits.    Skeleton/soft tissues: Unchanged.    Impression:    No radiographic evidence of acute cardiopulmonary disease.        --- End of Report ---            JAX CHERRY MD; Attending Radiologist  This document has been electronically signed. May  5 2022  8:13AM (22 @ 05:45)    EK Lead ECG:   Ventricular Rate 59 BPM    Atrial Rate 59 BPM    P-R Interval 200 ms    QRS Duration 78 ms    Q-T Interval 434 ms    QTC Calculation(Bazett) 429 ms    P Axis 32 degrees    R Axis 18 degrees    T Axis -8 degrees    Diagnosis Line *** Suspect unspecified pacemaker failure  non-captured pacing spikes  Sinus bradycardia  Nonspecific T wave abnormality  Abnormal ECG    Confirmed by Kristopher Shen ([id number]) on 2022 8:09:00 AM (22 @ 17:27)    MEDICATIONS  (STANDING):  amLODIPine   Tablet 5 milliGRAM(s) Oral daily  aspirin  chewable 81 milliGRAM(s) Oral daily  chlorhexidine 0.12% Liquid 5 milliLiter(s) Oral Mucosa two times a day  clopidogrel Tablet 75 milliGRAM(s) Oral daily  dextrose 50% Injectable 50 milliLiter(s) IV Push every 15 minutes  dextrose 50% Injectable 25 milliLiter(s) IV Push every 15 minutes  insulin regular Infusion 10 Unit(s)/Hr (10 mL/Hr) IV Continuous <Continuous>  meperidine     Injectable 25 milliGRAM(s) IV Push once  niCARdipine Infusion 5 mG/Hr (25 mL/Hr) IV Continuous <Continuous>  nitrofurantoin monohydrate/macrocrystals (MACROBID) 100 milliGRAM(s) Oral two times a day  nitroglycerin  Infusion 30 MICROgram(s)/Min (9 mL/Hr) IV Continuous <Continuous>  norepinephrine Infusion 0.05 MICROgram(s)/kG/Min (6.29 mL/Hr) IV Continuous <Continuous>  pantoprazole    Tablet 40 milliGRAM(s) Oral before breakfast  polyethylene glycol 3350 17 Gram(s) Oral daily  sodium chloride 0.9%. 1000 milliLiter(s) (20 mL/Hr) IV Continuous <Continuous>  vasopressin Infusion 0.04 Unit(s)/Min (2.4 mL/Hr) IV Continuous <Continuous>    MEDICATIONS  (PRN):  oxyCODONE    IR 5 milliGRAM(s) Oral every 6 hours PRN Moderate Pain (4 - 6)    HEPARIN:  [] YES [] NO  Dose: XX UNITS/HR UNITS Q8H  LOVENOX:[] YES [] NO  Dose: XX mg Q24H  COUMADIN: []  YES [] NO  Dose: XX mg  Q24H  SCD's: YES b/l  GI Prophylaxis: Protonix [], Pepcid [], None [], (Contra-indication:.....)    Post-Op Aspirin: Yes [],  No [], If No, then contra-indication:  Post-Op Statin: Yes [], No[], If No, then contra-indication:  Post-Op Beta-Blockers: Yes [], No [], If No, then contra-indication:    Allergies:  No Known Allergies      Ambulation/Activity Status: Ambulates several times daily with assistance.    Assessment/Plan:  81y Female status-post .....  - Case and plan discussed with CTU Intensivist and CT Surgeon - Dr. Ibanez/Luli/Fallon  - Continue CTU supportive care    - Continue DVT/GI prophylaxis  - Incentive Spirometry 10 times an hour  - Continue to advance physical activity as tolerated and continue PT/OT as directed  1. CAD: Continue ASA, statin, BB  2. HTN:   3. A. Fib:   4. COPD/Hypoxia:   5. DM/Glucose Control:                              OPERATIVE PROCEDURE(s): TAVR                POD #1                       82yo F  SURGEON(s): Dr. Ibanez    SUBJECTIVE ASSESSMENT: Overnight, no acute events, patient resting comfortably in bed, no acute concerns.     Vital Signs Last 24 Hrs  T(F): 97.8 (05 May 2022 06:00), Max: 98.1 (04 May 2022 20:00)  HR: 91 (05 May 2022 08:00) (54 - 105) SR  BP: 115/75 (05 May 2022 08:00) (88/56 - 123/60)  BP(mean): 89 (05 May 2022 08:00) (66 - 91)  ABP: 125/39 (05 May 2022 08:00) (111/39 - 144/53)  ABP(mean): 63 (05 May 2022 08:00)  RR: 32 (05 May 2022 08:00) (12 - 38)  SpO2: 95% (05 May 2022 08:00) (94% - 100%) RA  CVP(mm Hg): --  CVP(cm H2O): --  CO: --  CI: --  PA: --  SVR: --    I&O's Detail    04 May 2022 07:01  -  05 May 2022 07:00  --------------------------------------------------------  IN:    IV PiggyBack: 350 mL    NiCARdipine: 100 mL    Nitroglycerin: 140 mL    Oral Fluid: 720 mL    sodium chloride 0.9%: 320 mL  Total IN: 1630 mL    OUT:    Indwelling Catheter - Urethral (mL): 1525 mL  Total OUT: 1525 mL    Net:   I&O's Detail    04 May 2022 07:01  -  05 May 2022 07:00  --------------------------------------------------------  Total NET: 105 mL    CAPILLARY BLOOD GLUCOSE    Physical Exam:  General: NAD; A&Ox3  Cardiac: S1/S2, RRR, no murmur, no rubs  Lungs: unlabored respirations, CTA b/l, no wheeze, no rales, no crackles  Abdomen: Soft/NT/ND; positive bowel sounds x 4  Sternum: Intact, no click, incision healing well with no drainage  Incisions: Incisions clean/dry/intact  Extremities: No edema b/l lower extremities; good capillary refill; no cyanosis; palpable 1+ pedal pulses b/l    Washburn Catheter: Yes  [X] , No  [] , If yes indication:                      Day #  BOWEL MOVEMENT:  [] YES [X] NO, If No, Timing since last BM:      Day #      LABS:                        11.1<L>  6.54  )-----------( 214      ( 05 May 2022 01:10 )             32.6<L>                        11.8<L>  4.81  )-----------( 209      ( 04 May 2022 14:32 )             34.8<L>    05-05    135  |  103  |  14  ----------------------------<  93  3.8   |  21  |  0.6<L>  05-04    140  |  106  |  15  ----------------------------<  107<H>  4.1   |  21  |  0.6<L>    Ca    8.9      05 May 2022 01:10  Mg     2.5     05-05    TPro  5.8<L> [6.0 - 8.0]  /  Alb  4.1 [3.5 - 5.2]  /  TBili  0.6 [0.2 - 1.2]  /  DBili  x   /  AST  10 [0 - 41]  /  ALT  6 [0 - 41]  /  AlkPhos  56 [30 - 115]  05-05    PT/INR - ( 04 May 2022 14:32 )   PT: ;   INR: 1.07 ratio       PTT - ( 04 May 2022 14:32 )  PTT:33.0 sec    ABG - ( 05 May 2022 03:40 )  pH: 7.42  /  pCO2: 36    /  pO2: 101   / HCO3: 23    / Base Excess: -0.8  /  SaO2: 99.3  /  LA: 0.50     RADIOLOGY & ADDITIONAL TESTS:  CXR: Xray Chest 1 View- PORTABLE-Routine:   ACC: [id number] EXAM:  XR CHEST PORTABLE ROUTINE 1V                          PROCEDURE DATE:  2022      INTERPRETATION:  Clinical History / Reason for exam: Post TAVR    Comparison : Chest radiograph May 4, 2022.    Technique/Positioning: Single frontal chest x-ray obtained.    Findings:    Support devices: None.    Cardiac/mediastinum/hilum: Unchanged. Post TAVR.    Lung parenchyma/Pleura: Within normal limits.    Skeleton/soft tissues: Unchanged.    Impression:    No radiographic evidence of acute cardiopulmonary disease.    --- End of Report ---    JAX CHERRY MD; Attending Radiologist  This document has been electronically signed. May  5 2022  8:13AM (22 @ 05:45)    EK Lead ECG:   Ventricular Rate 59 BPM    Atrial Rate 59 BPM    P-R Interval 200 ms    QRS Duration 78 ms    Q-T Interval 434 ms    QTC Calculation(Bazett) 429 ms    P Axis 32 degrees    R Axis 18 degrees    T Axis -8 degrees    Diagnosis Line *** Suspect unspecified pacemaker failure  non-captured pacing spikes  Sinus bradycardia  Nonspecific T wave abnormality  Abnormal ECG    Confirmed by Kristopher Shen ([id number]) on 2022 8:09:00 AM (22 @ 17:27)    MEDICATIONS  (STANDING):  amLODIPine   Tablet 5 milliGRAM(s) Oral daily  aspirin  chewable 81 milliGRAM(s) Oral daily  chlorhexidine 0.12% Liquid 5 milliLiter(s) Oral Mucosa two times a day  clopidogrel Tablet 75 milliGRAM(s) Oral daily  dextrose 50% Injectable 50 milliLiter(s) IV Push every 15 minutes  dextrose 50% Injectable 25 milliLiter(s) IV Push every 15 minutes  insulin regular Infusion 10 Unit(s)/Hr (10 mL/Hr) IV Continuous <Continuous>  meperidine     Injectable 25 milliGRAM(s) IV Push once  niCARdipine Infusion 5 mG/Hr (25 mL/Hr) IV Continuous <Continuous>  nitrofurantoin monohydrate/macrocrystals (MACROBID) 100 milliGRAM(s) Oral two times a day  nitroglycerin  Infusion 30 MICROgram(s)/Min (9 mL/Hr) IV Continuous <Continuous>  norepinephrine Infusion 0.05 MICROgram(s)/kG/Min (6.29 mL/Hr) IV Continuous <Continuous>  pantoprazole    Tablet 40 milliGRAM(s) Oral before breakfast  polyethylene glycol 3350 17 Gram(s) Oral daily  sodium chloride 0.9%. 1000 milliLiter(s) (20 mL/Hr) IV Continuous <Continuous>  vasopressin Infusion 0.04 Unit(s)/Min (2.4 mL/Hr) IV Continuous <Continuous>    MEDICATIONS  (PRN):  oxyCODONE    IR 5 milliGRAM(s) Oral every 6 hours PRN Moderate Pain (4 - 6)    HEPARIN:  [] YES [X] NO  Dose: XX UNITS/HR UNITS Q8H  SCD's: YES b/l  GI Prophylaxis: Protonix [X], Pepcid [], None [], (Contra-indication:.....)    Post-Op Aspirin: Yes [X],  No [], If No, then contra-indication:  Post-Op Statin: Yes [], No[], If No, then contra-indication:  Post-Op Beta-Blockers: Yes [], No [], If No, then contra-indication:    Allergies:  No Known Allergies    Ambulation/Activity Status: Ambulates several times daily with assistance.    Assessment/Plan:  82yo F with pmhx of CAD s/p PCIx3, HTN, GERD, AS s/p TAVR on POD #1  - Case and plan discussed with CTU Intensivist and CT Surgeon - Dr. Ibanez/Luli/Fallon  - Continue CTU supportive care    - Continue DVT/GI prophylaxis  - Incentive Spirometry 10 times an hour  - Continue to advance physical activity as tolerated and continue PT/OT as directed  1. AS s/p TAVR: c/w ASA 81mg qd, Plavix 75mg qd  - f/u TTE   2. CAD: c/w ASA 81mg qd, restart home pravastatin on d/c   3. Hypoxia: wean O2, encourage incentive spirometer use   4. HTN: c/w amlodipine 5mg qd, order lisinopril 10mg qd if needed for elevated BP   5. Glucose Control: A1C 5.5, off insulin drip   6. UTI: found on pre-op screening, c/w Macrobid 100mg BID (end )  - d/c with abx      - d/c Washburn catheter and a-line     disposition: d/c home with [facility], with MCOT monitoring

## 2022-05-06 ENCOUNTER — TRANSCRIPTION ENCOUNTER (OUTPATIENT)
Age: 82
End: 2022-05-06

## 2022-05-10 DIAGNOSIS — K21.9 GASTRO-ESOPHAGEAL REFLUX DISEASE WITHOUT ESOPHAGITIS: ICD-10-CM

## 2022-05-10 DIAGNOSIS — I35.0 NONRHEUMATIC AORTIC (VALVE) STENOSIS: ICD-10-CM

## 2022-05-10 DIAGNOSIS — I25.10 ATHEROSCLEROTIC HEART DISEASE OF NATIVE CORONARY ARTERY WITHOUT ANGINA PECTORIS: ICD-10-CM

## 2022-05-10 DIAGNOSIS — F41.9 ANXIETY DISORDER, UNSPECIFIED: ICD-10-CM

## 2022-05-10 DIAGNOSIS — I10 ESSENTIAL (PRIMARY) HYPERTENSION: ICD-10-CM

## 2022-05-12 ENCOUNTER — APPOINTMENT (OUTPATIENT)
Dept: CARDIOLOGY | Facility: CLINIC | Age: 82
End: 2022-05-12

## 2022-05-12 ENCOUNTER — APPOINTMENT (OUTPATIENT)
Dept: CARDIOLOGY | Facility: CLINIC | Age: 82
End: 2022-05-12
Payer: MEDICARE

## 2022-05-12 VITALS
OXYGEN SATURATION: 98 % | WEIGHT: 150 LBS | BODY MASS INDEX: 25.61 KG/M2 | HEIGHT: 64 IN | SYSTOLIC BLOOD PRESSURE: 131 MMHG | RESPIRATION RATE: 18 BRPM | HEART RATE: 89 BPM | DIASTOLIC BLOOD PRESSURE: 83 MMHG

## 2022-05-12 VITALS
HEART RATE: 89 BPM | DIASTOLIC BLOOD PRESSURE: 83 MMHG | HEIGHT: 64 IN | TEMPERATURE: 98.3 F | OXYGEN SATURATION: 98 % | BODY MASS INDEX: 25.61 KG/M2 | WEIGHT: 150 LBS | SYSTOLIC BLOOD PRESSURE: 131 MMHG | RESPIRATION RATE: 18 BRPM

## 2022-05-12 PROCEDURE — 99213 OFFICE O/P EST LOW 20 MIN: CPT

## 2022-05-12 NOTE — HISTORY OF PRESENT ILLNESS
[FreeTextEntry1] : Ms. DANA LOPEZ is a 81 year F that arrives today for a post op visit. Patient is status post TAVR on 5/4/22 via TF approach.  Patient arrives to the office for their first post op visit. On arrival patient states she is doing well. Denies SOB or palpitation. NYHA class I. Patient currently living at home alone with no aid. \par \par Reports breathing is much improved since TAVR\par Groin is soft, offers no complaints\par Wearing MCOT

## 2022-05-12 NOTE — END OF VISIT
[FreeTextEntry3] : Seen / examined and above reviewed.\par Doing well post TAVR.\par Breathing more comfortable.\par BP controlled / compensated.\par - ASA / Plavix.\par - Cont antihypertensives.\par -Follow-up / studies 1-m post procedure.

## 2022-05-12 NOTE — ASSESSMENT
What Is The Reason For Today's Visit?: Mole Check [FreeTextEntry1] : Ms. DANA LOPEZ 81 year F, never smoker with PMH of CAD S/P PCI X3 2007, 2009, 2015, HTN, GERD, Hysterectomy ?ovarian ca, TKR 1/2022. Symptoms include SOB with stairs or activity, chest pressure when lying down- 3-4 Months, fatigue. NYHA class III.  Echocardiogram showed severe Aortic Stenosis. On 05/04/22, she presented for Elective TAVR. Postoperatively the patient had an uncomplicated course and was discharged home in stable condition on POD# 1.\par \par Plan:\par Symptoms of SOB overall improved since TAVR\par Feels well, groin is soft and MCOT is on\par F/U in 1 month with Echo and Labs\par F/U with EP\par F/U with PMD for routine medical care\par \par Karly OLIVO [facility], am acting as scribe for  Additional History: Patient has no new or concerning lesions at this time.

## 2022-05-13 DIAGNOSIS — I35.0 NONRHEUMATIC AORTIC (VALVE) STENOSIS: ICD-10-CM

## 2022-05-13 DIAGNOSIS — R09.02 HYPOXEMIA: ICD-10-CM

## 2022-05-13 DIAGNOSIS — I25.10 ATHEROSCLEROTIC HEART DISEASE OF NATIVE CORONARY ARTERY WITHOUT ANGINA PECTORIS: ICD-10-CM

## 2022-05-13 DIAGNOSIS — Z85.43 PERSONAL HISTORY OF MALIGNANT NEOPLASM OF OVARY: ICD-10-CM

## 2022-05-13 DIAGNOSIS — Z79.82 LONG TERM (CURRENT) USE OF ASPIRIN: ICD-10-CM

## 2022-05-13 DIAGNOSIS — Z00.6 ENCOUNTER FOR EXAMINATION FOR NORMAL COMPARISON AND CONTROL IN CLINICAL RESEARCH PROGRAM: ICD-10-CM

## 2022-05-13 DIAGNOSIS — Z96.651 PRESENCE OF RIGHT ARTIFICIAL KNEE JOINT: ICD-10-CM

## 2022-05-13 DIAGNOSIS — K21.9 GASTRO-ESOPHAGEAL REFLUX DISEASE WITHOUT ESOPHAGITIS: ICD-10-CM

## 2022-05-13 DIAGNOSIS — N39.0 URINARY TRACT INFECTION, SITE NOT SPECIFIED: ICD-10-CM

## 2022-05-13 DIAGNOSIS — I10 ESSENTIAL (PRIMARY) HYPERTENSION: ICD-10-CM

## 2022-05-13 DIAGNOSIS — D64.9 ANEMIA, UNSPECIFIED: ICD-10-CM

## 2022-05-13 DIAGNOSIS — Z95.5 PRESENCE OF CORONARY ANGIOPLASTY IMPLANT AND GRAFT: ICD-10-CM

## 2022-05-13 DIAGNOSIS — Z90.710 ACQUIRED ABSENCE OF BOTH CERVIX AND UTERUS: ICD-10-CM

## 2022-05-19 ENCOUNTER — TRANSCRIPTION ENCOUNTER (OUTPATIENT)
Age: 82
End: 2022-05-19

## 2022-06-02 ENCOUNTER — APPOINTMENT (OUTPATIENT)
Dept: CARDIOLOGY | Facility: CLINIC | Age: 82
End: 2022-06-02

## 2022-06-03 ENCOUNTER — TRANSCRIPTION ENCOUNTER (OUTPATIENT)
Age: 82
End: 2022-06-03

## 2022-06-09 ENCOUNTER — APPOINTMENT (OUTPATIENT)
Dept: CARDIOLOGY | Facility: CLINIC | Age: 82
End: 2022-06-09
Payer: MEDICARE

## 2022-06-09 VITALS
OXYGEN SATURATION: 97 % | BODY MASS INDEX: 25.61 KG/M2 | HEIGHT: 64 IN | TEMPERATURE: 97.8 F | SYSTOLIC BLOOD PRESSURE: 120 MMHG | WEIGHT: 150 LBS | RESPIRATION RATE: 16 BRPM | DIASTOLIC BLOOD PRESSURE: 73 MMHG | HEART RATE: 91 BPM

## 2022-06-09 PROCEDURE — 99214 OFFICE O/P EST MOD 30 MIN: CPT

## 2022-06-09 RX ORDER — NITROFURANTOIN (MONOHYDRATE/MACROCRYSTALS) 25; 75 MG/1; MG/1
100 CAPSULE ORAL
Qty: 14 | Refills: 0 | Status: COMPLETED | COMMUNITY
Start: 2022-04-28 | End: 2022-06-09

## 2022-06-12 NOTE — END OF VISIT
[FreeTextEntry3] : Seen / examined and above reviewed.\par Breathing improved post-TAVR.\par Fatigued.  Very anxious.  Palpitations ?  Difficulty qualifying.\par BP controlled.\par Compensated.\par MCOT reviewed.  No bradyarrhythmias.  Moderate PVC burden.\par - ASA / Plavix.\par - Start Toprol.\par - Cont antihypertensives.

## 2022-06-12 NOTE — ASSESSMENT
[FreeTextEntry1] : Ms. DANA LOPEZ is a 81 year F that arrives today for a follow up. Patient is status post TAVR on 5/4/22 via TF approach. She is c/o of fatigue. Arrives fore valuation. Reports breathing is much improved since TAVR. Medications evaluated, and Holter evaluated to rule out heart block. Will adjust medications. Start Toprol XL 25mg po qd. \par RTO in 1 week.

## 2022-06-12 NOTE — REASON FOR VISIT
[Symptom and Test Evaluation] : symptom and test evaluation [Structural Heart and Valve Disease] : structural heart and valve disease [FreeTextEntry1] : Ms. DANA LOPEZ is a 81 year F that arrives today for a follow up. Patient is status post TAVR on 5/4/22 via TF approach. She is c/o of fatigue. Arrives fore valuation.\par \par Reports breathing is much improved since TAVR\par

## 2022-06-17 ENCOUNTER — APPOINTMENT (OUTPATIENT)
Dept: CARDIOTHORACIC SURGERY | Facility: CLINIC | Age: 82
End: 2022-06-17
Payer: MEDICARE

## 2022-06-17 ENCOUNTER — APPOINTMENT (OUTPATIENT)
Dept: CARDIOLOGY | Facility: CLINIC | Age: 82
End: 2022-06-17
Payer: MEDICARE

## 2022-06-17 VITALS
HEIGHT: 64 IN | OXYGEN SATURATION: 96 % | HEART RATE: 68 BPM | SYSTOLIC BLOOD PRESSURE: 133 MMHG | TEMPERATURE: 98 F | RESPIRATION RATE: 14 BRPM | BODY MASS INDEX: 25.61 KG/M2 | WEIGHT: 150 LBS | DIASTOLIC BLOOD PRESSURE: 87 MMHG

## 2022-06-17 LAB
ALBUMIN SERPL ELPH-MCNC: 5.2 G/DL
ALP BLD-CCNC: 87 U/L
ALT SERPL-CCNC: 10 U/L
ANION GAP SERPL CALC-SCNC: 14 MMOL/L
AST SERPL-CCNC: 14 U/L
BASOPHILS # BLD AUTO: 0.04 K/UL
BASOPHILS NFR BLD AUTO: 0.7 %
BILIRUB SERPL-MCNC: 0.5 MG/DL
BUN SERPL-MCNC: 21 MG/DL
CALCIUM SERPL-MCNC: 9.7 MG/DL
CHLORIDE SERPL-SCNC: 103 MMOL/L
CO2 SERPL-SCNC: 25 MMOL/L
CREAT SERPL-MCNC: 0.7 MG/DL
EGFR: 87 ML/MIN/1.73M2
EOSINOPHIL # BLD AUTO: 0.09 K/UL
EOSINOPHIL NFR BLD AUTO: 1.6 %
GLUCOSE SERPL-MCNC: 92 MG/DL
HCT VFR BLD CALC: 43.7 %
HGB BLD-MCNC: 14 G/DL
IMM GRANULOCYTES NFR BLD AUTO: 0.2 %
LYMPHOCYTES # BLD AUTO: 1.71 K/UL
LYMPHOCYTES NFR BLD AUTO: 31 %
MAN DIFF?: NORMAL
MCHC RBC-ENTMCNC: 31.9 PG
MCHC RBC-ENTMCNC: 32 G/DL
MCV RBC AUTO: 99.5 FL
MONOCYTES # BLD AUTO: 0.57 K/UL
MONOCYTES NFR BLD AUTO: 10.3 %
NEUTROPHILS # BLD AUTO: 3.1 K/UL
NEUTROPHILS NFR BLD AUTO: 56.2 %
PLATELET # BLD AUTO: 247 K/UL
POTASSIUM SERPL-SCNC: 4.2 MMOL/L
PROT SERPL-MCNC: 7.2 G/DL
RBC # BLD: 4.39 M/UL
RBC # FLD: 13.8 %
SODIUM SERPL-SCNC: 142 MMOL/L
WBC # FLD AUTO: 5.52 K/UL

## 2022-06-17 PROCEDURE — 99213 OFFICE O/P EST LOW 20 MIN: CPT

## 2022-06-17 PROCEDURE — 93000 ELECTROCARDIOGRAM COMPLETE: CPT

## 2022-06-17 PROCEDURE — 93306 TTE W/DOPPLER COMPLETE: CPT

## 2022-06-17 NOTE — HISTORY OF PRESENT ILLNESS
[FreeTextEntry1] : Ms. DANA LOPEZ 81 year F, never smoker with PMH of CAD S/P PCI X3 2007, 2009, 2015, HTN, GERD, Hysterectomy, ovarian ca, TKR 1/2022. On 05/04/22, she presented for Elective TAVR. She arrives for her one month structural follow up. States she is making improvement since adjusting her meds and adding a beta blocker.  NYHA class I. Lives home alone with no aid.

## 2022-06-17 NOTE — ASSESSMENT
[FreeTextEntry1] : Ms. DANA LOPEZ 81 year F, never smoker with PMH of CAD S/P PCI X3 2007, 2009, 2015, HTN, GERD, Hysterectomy, ovarian ca, TKR 1/2022. On 05/04/22, she presented for Elective TAVR. She arrives for her one month structural follow up. States she is making improvement since adjusting her meds and adding a beta blocker.  NYHA class I. Lives home alone with no aid. \par \par Imaging reviewed. Bioprosthetic valve functioning appropriately. Labs and EKG done today. KCCQ completed. Patient will need a 3 month follow up with Dr. Lopez for routine cardiology follow up.

## 2022-07-19 ENCOUNTER — NON-APPOINTMENT (OUTPATIENT)
Age: 82
End: 2022-07-19

## 2022-08-15 ENCOUNTER — APPOINTMENT (OUTPATIENT)
Dept: CARDIOLOGY | Facility: CLINIC | Age: 82
End: 2022-08-15

## 2022-08-15 VITALS
HEART RATE: 70 BPM | TEMPERATURE: 97.8 F | WEIGHT: 152 LBS | SYSTOLIC BLOOD PRESSURE: 133 MMHG | BODY MASS INDEX: 25.95 KG/M2 | DIASTOLIC BLOOD PRESSURE: 76 MMHG | HEIGHT: 64 IN

## 2022-08-15 PROCEDURE — 93000 ELECTROCARDIOGRAM COMPLETE: CPT

## 2022-08-15 PROCEDURE — 99214 OFFICE O/P EST MOD 30 MIN: CPT | Mod: 25

## 2022-08-15 RX ORDER — METOPROLOL SUCCINATE 25 MG/1
25 TABLET, EXTENDED RELEASE ORAL DAILY
Qty: 30 | Refills: 0 | Status: DISCONTINUED | COMMUNITY
Start: 2022-06-09 | End: 2022-08-15

## 2022-08-15 NOTE — PHYSICAL EXAM
[de-identified] : well appearing, overweight, no distress [de-identified] : reg, nL s1/s2, no m/r/g [de-identified] : CTA [de-identified] : warm, no edema [de-identified] : alert, normal affect, logical conversation

## 2022-08-15 NOTE — ASSESSMENT
[FreeTextEntry1] : CAD s/p PCI (2007 / 2011).\par No angina.\par \par Severe AS s/p TAVR\par nL THV function\par \par BP controlled\par \par Lipitor intolerance?\par Tolerating Pravastatin.

## 2022-08-15 NOTE — REASON FOR VISIT
[Follow-Up - Clinic] : a clinic follow-up of [Coronary Artery Disease] : coronary artery disease [FreeTextEntry1] : Feels well.\par \par Anxious (baseline).  She admits this is a big issue.   moved who used to help her.  Did not have connection with psychiatrist.  Strongly against Rx (father committed suicide).\par \par Breathing relatively comfortable.  No angina.\par \par Recurrent GERD.  Not on PPI (has Espino).

## 2022-08-15 NOTE — HISTORY OF PRESENT ILLNESS
[FreeTextEntry1] : 79 year-old female presents to [facility] care.\par Previously followed with Dr. Alford.\par \par Cardiac history:\par CAD s/p PCI LAD / CX (2007).\par Repeat PCI 2011\par Murmur\par \par Worsening exertional dyspnea / fatigue x several months.  Difficulty with steps.  Breathing comfortable at rest.\par \par Vague chest discomfort.  Somewhat reminiscent of prior angina, but less intense.  Usually exertional, but occasionally at rest.\par \par Nocturnal palpitations in bed.\par \par Mild brief lightheadedness.  No syncope.\par \par Notably, admitted baseline anxiety is worse secondary to [place].

## 2022-08-15 NOTE — DISCUSSION/SUMMARY
[FreeTextEntry1] : Cont ASA.\par Cont Amlodipine-Benazepril.\par Cont Pravastatin.\par Resume Protonix / GI follow-up.\par Walks (advised cardiac rehab / transportation issue)\par Follow-up 6-months.

## 2022-08-23 ENCOUNTER — APPOINTMENT (OUTPATIENT)
Dept: OTOLARYNGOLOGY | Facility: CLINIC | Age: 82
End: 2022-08-23

## 2022-09-30 NOTE — REVIEW OF SYSTEMS
[SOB on Exertion] : shortness of breath during exertion [Negative] : Heme/Lymph [Fever] : no fever [Chills] : no chills [Feeling Poorly] : not feeling poorly [Feeling Tired] : not feeling tired [Earache] : no earache [Loss Of Hearing] : no hearing loss [Nosebleeds] : no nosebleeds [Nasal Discharge] : no nasal discharge [Heart Rate Is Slow] : the heart rate was not slow [Heart Rate Is Fast] : the heart rate was not fast [Chest Pain] : no chest pain [Palpitations] : no palpitations [Shortness Of Breath] : no shortness of breath [Wheezing] : no wheezing [Cough] : no cough [Joint Swelling] : no joint swelling [Joint Stiffness] : no joint stiffness [Limb Pain] : no limb pain [Limb Swelling] : no limb swelling [Skin Lesions] : no skin lesions [Skin Wound] : no skin wound [Itching] : no itching [Change In A Mole] : no change in a mole 4 = No assist / stand by assistance

## 2022-10-03 ENCOUNTER — APPOINTMENT (OUTPATIENT)
Dept: CARDIOLOGY | Facility: CLINIC | Age: 82
End: 2022-10-03

## 2022-10-03 ENCOUNTER — RESULT CHARGE (OUTPATIENT)
Age: 82
End: 2022-10-03

## 2022-10-03 VITALS
HEIGHT: 64 IN | HEART RATE: 66 BPM | DIASTOLIC BLOOD PRESSURE: 84 MMHG | SYSTOLIC BLOOD PRESSURE: 122 MMHG | BODY MASS INDEX: 26.46 KG/M2 | WEIGHT: 155 LBS

## 2022-10-03 PROCEDURE — 99214 OFFICE O/P EST MOD 30 MIN: CPT | Mod: 25

## 2022-10-03 PROCEDURE — 93000 ELECTROCARDIOGRAM COMPLETE: CPT

## 2022-10-16 NOTE — PHYSICAL EXAM
[de-identified] : well appearing, overweight, no distress [de-identified] : reg, nL s1/s2, no m/r/g [de-identified] : CTA [de-identified] : warm, no edema [de-identified] : alert, normal affect, logical conversation

## 2022-10-16 NOTE — DISCUSSION/SUMMARY
[FreeTextEntry1] : Cont ASA\par Cont Amlodipine-Benazepril\par Cont Pravastatin\par Cont Protonix / GI follow-up\par Walks\par Advised therapist\par Follow-up 6-months

## 2022-10-16 NOTE — HISTORY OF PRESENT ILLNESS
[FreeTextEntry1] : 79 year-old female presents to [facility] care.\par Previously followed with Dr. Alford.\par \par Cardiac history:\par CAD s/p PCI LAD / CX (2007).\par Repeat PCI 2011\par Murmur\par \par Worsening exertional dyspnea / fatigue x several months.  Difficulty with steps.  Breathing comfortable at rest.\par \par Vague chest discomfort.  Somewhat reminiscent of prior angina, but less intense.  Usually exertional, but occasionally at rest.\par \par Nocturnal palpitations in bed.\par \par Mild brief lightheadedness.  No syncope.\par \par Notably, admitted baseline anxiety is worse secondary to the [place] of [place].

## 2022-10-16 NOTE — REASON FOR VISIT
[FreeTextEntry1] : Feels well.\par \par Chronic anxiety.\par \par No interval cardiac symptoms.\par \par No angina.  Breathing comfortable. [Follow-Up - Clinic] : a clinic follow-up of [Coronary Artery Disease] : coronary artery disease

## 2023-02-13 ENCOUNTER — APPOINTMENT (OUTPATIENT)
Dept: CARDIOLOGY | Facility: CLINIC | Age: 83
End: 2023-02-13

## 2023-03-24 ENCOUNTER — APPOINTMENT (OUTPATIENT)
Dept: CARDIOLOGY | Facility: CLINIC | Age: 83
End: 2023-03-24
Payer: MEDICARE

## 2023-03-24 VITALS
SYSTOLIC BLOOD PRESSURE: 142 MMHG | TEMPERATURE: 97 F | BODY MASS INDEX: 26.63 KG/M2 | HEART RATE: 65 BPM | HEIGHT: 64 IN | DIASTOLIC BLOOD PRESSURE: 80 MMHG | WEIGHT: 156 LBS

## 2023-03-24 DIAGNOSIS — Z95.2 PRESENCE OF PROSTHETIC HEART VALVE: ICD-10-CM

## 2023-03-24 PROCEDURE — 99214 OFFICE O/P EST MOD 30 MIN: CPT | Mod: 25

## 2023-03-24 PROCEDURE — 93000 ELECTROCARDIOGRAM COMPLETE: CPT

## 2023-03-24 RX ORDER — PRAVASTATIN SODIUM 10 MG/1
10 TABLET ORAL DAILY
Qty: 90 | Refills: 3 | Status: DISCONTINUED | COMMUNITY
Start: 2021-11-18 | End: 2023-03-24

## 2023-03-24 NOTE — DISCUSSION/SUMMARY
[FreeTextEntry1] : Cont ASA\par Cont Amlodipine-Benazepril\par Cont Pravastatin\par Cont Protonix / GI follow-up\par Again advised therapist / antidepressant trial.\par Physical Therapy\par Follow-up 6-months

## 2023-03-24 NOTE — HISTORY OF PRESENT ILLNESS
[FreeTextEntry1] : 79 year-old female presents to [facility] care.\par Previously followed with Dr. Alford.\par \par Cardiac history:\par CAD s/p PCI LAD / CX (2007).\par Repeat PCI 2011\par Murmur\par \par Worsening exertional dyspnea / fatigue x several months.  Difficulty with steps.  Breathing comfortable at rest.\par \par Vague chest discomfort.  Somewhat reminiscent of prior angina, but less intense.  Usually exertional, but occasionally at rest.\par \par Nocturnal palpitations in bed.\par \par Mild brief lightheadedness.  No syncope.\par \par Notably, admitted baseline anxiety is worse secondary to the [place] of [place].

## 2023-03-24 NOTE — REASON FOR VISIT
[Follow-Up - Clinic] : a clinic follow-up of [Coronary Artery Disease] : coronary artery disease [FreeTextEntry1] : Feels well.\par \par Chronic anxiety / depression.  More fearful / less active.  Not suicidal.\par \par Less strong on legs / deconditioned.\par \par No interval cardiac symptoms.

## 2023-03-24 NOTE — PHYSICAL EXAM
[de-identified] : well appearing, overweight, no distress [de-identified] : reg, nL s1/s2, no m/r/g [de-identified] : CTA [de-identified] : warm, no edema [de-identified] : alert, normal affect, logical conversation

## 2023-06-29 ENCOUNTER — APPOINTMENT (OUTPATIENT)
Dept: CARDIOLOGY | Facility: CLINIC | Age: 83
End: 2023-06-29
Payer: MEDICARE

## 2023-06-29 VITALS
HEIGHT: 64 IN | HEART RATE: 75 BPM | RESPIRATION RATE: 14 BRPM | BODY MASS INDEX: 26.63 KG/M2 | DIASTOLIC BLOOD PRESSURE: 100 MMHG | OXYGEN SATURATION: 97 % | WEIGHT: 156 LBS | SYSTOLIC BLOOD PRESSURE: 161 MMHG

## 2023-06-29 PROCEDURE — 93306 TTE W/DOPPLER COMPLETE: CPT

## 2023-06-29 PROCEDURE — 99213 OFFICE O/P EST LOW 20 MIN: CPT

## 2023-07-02 NOTE — HISTORY OF PRESENT ILLNESS
[FreeTextEntry1] : Ms. DANA LOPEZ 81 year F, never smoker with PMH of CAD S/P PCI X3 2007, 2009, 2015, HTN, GERD, Hysterectomy ?ovarian ca, TKR 1/2022. Symptoms include SOB with stairs or activity, chest pressure when lying down- 3-4 Months, fatigue. NYHA class III.  Echocardiogram showed severe Aortic Stenosis. On 05/04/22, she presented for Elective TAVR. Postoperatively the patient had an uncomplicated course and was discharged home in stable condition on POD# 1. Here today for f/u 1 year s/p TAVR. \par \par PMD: [place] - may change to [place]\par Cardio: Jose J

## 2023-07-02 NOTE — END OF VISIT
[FreeTextEntry3] : Feels well.\par Still anxious.  Going to call her old  who was very helpful in past.\par ECHO reviewed: nL LVSF / THV function.\par Cont current cardiac Rx.\par Follow-up 3-months ([address])

## 2023-07-02 NOTE — ASSESSMENT
[FreeTextEntry1] : Ms. DANA LOPEZ is a 82 year F that arrives today for a post op visit. Patient is status post TAVR on 5/4/2022 via TF approach. Patient arrives to the office for the 1 year visit. On arrival patient denies SOB or palpitation. Patient states that they feel better post procedure \par \par Pt lives home no aide\par NYHA class: I\par \par Echo reviewed\par Pt reports anxiety r/t son moving upstate - Advised to f/u with PMD - states she does not want to be placed on any antidepressants and anti anxiety meds but states she is changing her PMD and will be open to discussing \par \par F/U PMD for routine care and management and discussion for antianxiety meds\par F/U cardio for continued  medical management \par F/U CTS PRN \par

## 2023-09-15 ENCOUNTER — APPOINTMENT (OUTPATIENT)
Dept: CARDIOLOGY | Facility: CLINIC | Age: 83
End: 2023-09-15
Payer: MEDICARE

## 2023-09-15 VITALS
HEART RATE: 68 BPM | WEIGHT: 163 LBS | SYSTOLIC BLOOD PRESSURE: 160 MMHG | HEIGHT: 64 IN | BODY MASS INDEX: 27.83 KG/M2 | DIASTOLIC BLOOD PRESSURE: 82 MMHG

## 2023-09-15 PROCEDURE — 93000 ELECTROCARDIOGRAM COMPLETE: CPT

## 2023-09-15 PROCEDURE — 99214 OFFICE O/P EST MOD 30 MIN: CPT | Mod: 25

## 2023-09-29 ENCOUNTER — APPOINTMENT (OUTPATIENT)
Dept: CARDIOLOGY | Facility: CLINIC | Age: 83
End: 2023-09-29
Payer: MEDICARE

## 2023-09-29 VITALS
HEART RATE: 81 BPM | HEIGHT: 64 IN | SYSTOLIC BLOOD PRESSURE: 146 MMHG | DIASTOLIC BLOOD PRESSURE: 90 MMHG | BODY MASS INDEX: 25.95 KG/M2 | WEIGHT: 152 LBS

## 2023-09-29 PROCEDURE — 99214 OFFICE O/P EST MOD 30 MIN: CPT | Mod: 25

## 2023-09-29 PROCEDURE — 93000 ELECTROCARDIOGRAM COMPLETE: CPT

## 2023-11-17 ENCOUNTER — INPATIENT (INPATIENT)
Facility: HOSPITAL | Age: 83
LOS: 1 days | Discharge: HOME | DRG: 149 | End: 2023-11-19
Attending: INTERNAL MEDICINE | Admitting: STUDENT IN AN ORGANIZED HEALTH CARE EDUCATION/TRAINING PROGRAM
Payer: MEDICARE

## 2023-11-17 VITALS
SYSTOLIC BLOOD PRESSURE: 179 MMHG | OXYGEN SATURATION: 98 % | HEART RATE: 86 BPM | WEIGHT: 154.1 LBS | DIASTOLIC BLOOD PRESSURE: 85 MMHG | TEMPERATURE: 99 F | RESPIRATION RATE: 22 BRPM

## 2023-11-17 DIAGNOSIS — R42 DIZZINESS AND GIDDINESS: ICD-10-CM

## 2023-11-17 DIAGNOSIS — Z90.49 ACQUIRED ABSENCE OF OTHER SPECIFIED PARTS OF DIGESTIVE TRACT: Chronic | ICD-10-CM

## 2023-11-17 DIAGNOSIS — Z90.710 ACQUIRED ABSENCE OF BOTH CERVIX AND UTERUS: Chronic | ICD-10-CM

## 2023-11-17 DIAGNOSIS — Z96.651 PRESENCE OF RIGHT ARTIFICIAL KNEE JOINT: Chronic | ICD-10-CM

## 2023-11-17 LAB
ALBUMIN SERPL ELPH-MCNC: 4.9 G/DL — SIGNIFICANT CHANGE UP (ref 3.5–5.2)
ALBUMIN SERPL ELPH-MCNC: 4.9 G/DL — SIGNIFICANT CHANGE UP (ref 3.5–5.2)
ALP SERPL-CCNC: 77 U/L — SIGNIFICANT CHANGE UP (ref 30–115)
ALP SERPL-CCNC: 77 U/L — SIGNIFICANT CHANGE UP (ref 30–115)
ALT FLD-CCNC: 9 U/L — SIGNIFICANT CHANGE UP (ref 0–41)
ALT FLD-CCNC: 9 U/L — SIGNIFICANT CHANGE UP (ref 0–41)
ANION GAP SERPL CALC-SCNC: 12 MMOL/L — SIGNIFICANT CHANGE UP (ref 7–14)
ANION GAP SERPL CALC-SCNC: 12 MMOL/L — SIGNIFICANT CHANGE UP (ref 7–14)
APTT BLD: 31.5 SEC — SIGNIFICANT CHANGE UP (ref 27–39.2)
APTT BLD: 31.5 SEC — SIGNIFICANT CHANGE UP (ref 27–39.2)
AST SERPL-CCNC: 14 U/L — SIGNIFICANT CHANGE UP (ref 0–41)
AST SERPL-CCNC: 14 U/L — SIGNIFICANT CHANGE UP (ref 0–41)
BASOPHILS # BLD AUTO: 0.02 K/UL — SIGNIFICANT CHANGE UP (ref 0–0.2)
BASOPHILS # BLD AUTO: 0.02 K/UL — SIGNIFICANT CHANGE UP (ref 0–0.2)
BASOPHILS NFR BLD AUTO: 0.4 % — SIGNIFICANT CHANGE UP (ref 0–1)
BASOPHILS NFR BLD AUTO: 0.4 % — SIGNIFICANT CHANGE UP (ref 0–1)
BILIRUB SERPL-MCNC: 0.5 MG/DL — SIGNIFICANT CHANGE UP (ref 0.2–1.2)
BILIRUB SERPL-MCNC: 0.5 MG/DL — SIGNIFICANT CHANGE UP (ref 0.2–1.2)
BUN SERPL-MCNC: 16 MG/DL — SIGNIFICANT CHANGE UP (ref 10–20)
BUN SERPL-MCNC: 16 MG/DL — SIGNIFICANT CHANGE UP (ref 10–20)
CALCIUM SERPL-MCNC: 10.1 MG/DL — SIGNIFICANT CHANGE UP (ref 8.4–10.4)
CALCIUM SERPL-MCNC: 10.1 MG/DL — SIGNIFICANT CHANGE UP (ref 8.4–10.4)
CHLORIDE SERPL-SCNC: 105 MMOL/L — SIGNIFICANT CHANGE UP (ref 98–110)
CHLORIDE SERPL-SCNC: 105 MMOL/L — SIGNIFICANT CHANGE UP (ref 98–110)
CO2 SERPL-SCNC: 27 MMOL/L — SIGNIFICANT CHANGE UP (ref 17–32)
CO2 SERPL-SCNC: 27 MMOL/L — SIGNIFICANT CHANGE UP (ref 17–32)
CREAT SERPL-MCNC: 0.9 MG/DL — SIGNIFICANT CHANGE UP (ref 0.7–1.5)
CREAT SERPL-MCNC: 0.9 MG/DL — SIGNIFICANT CHANGE UP (ref 0.7–1.5)
EGFR: 64 ML/MIN/1.73M2 — SIGNIFICANT CHANGE UP
EGFR: 64 ML/MIN/1.73M2 — SIGNIFICANT CHANGE UP
EOSINOPHIL # BLD AUTO: 0.05 K/UL — SIGNIFICANT CHANGE UP (ref 0–0.7)
EOSINOPHIL # BLD AUTO: 0.05 K/UL — SIGNIFICANT CHANGE UP (ref 0–0.7)
EOSINOPHIL NFR BLD AUTO: 1 % — SIGNIFICANT CHANGE UP (ref 0–8)
EOSINOPHIL NFR BLD AUTO: 1 % — SIGNIFICANT CHANGE UP (ref 0–8)
GLUCOSE SERPL-MCNC: 84 MG/DL — SIGNIFICANT CHANGE UP (ref 70–99)
GLUCOSE SERPL-MCNC: 84 MG/DL — SIGNIFICANT CHANGE UP (ref 70–99)
HCT VFR BLD CALC: 40.9 % — SIGNIFICANT CHANGE UP (ref 37–47)
HCT VFR BLD CALC: 40.9 % — SIGNIFICANT CHANGE UP (ref 37–47)
HGB BLD-MCNC: 13.7 G/DL — SIGNIFICANT CHANGE UP (ref 12–16)
HGB BLD-MCNC: 13.7 G/DL — SIGNIFICANT CHANGE UP (ref 12–16)
IMM GRANULOCYTES NFR BLD AUTO: 0.2 % — SIGNIFICANT CHANGE UP (ref 0.1–0.3)
IMM GRANULOCYTES NFR BLD AUTO: 0.2 % — SIGNIFICANT CHANGE UP (ref 0.1–0.3)
INR BLD: 1.03 RATIO — SIGNIFICANT CHANGE UP (ref 0.65–1.3)
INR BLD: 1.03 RATIO — SIGNIFICANT CHANGE UP (ref 0.65–1.3)
LYMPHOCYTES # BLD AUTO: 1.13 K/UL — LOW (ref 1.2–3.4)
LYMPHOCYTES # BLD AUTO: 1.13 K/UL — LOW (ref 1.2–3.4)
LYMPHOCYTES # BLD AUTO: 22.4 % — SIGNIFICANT CHANGE UP (ref 20.5–51.1)
LYMPHOCYTES # BLD AUTO: 22.4 % — SIGNIFICANT CHANGE UP (ref 20.5–51.1)
MCHC RBC-ENTMCNC: 31.9 PG — HIGH (ref 27–31)
MCHC RBC-ENTMCNC: 31.9 PG — HIGH (ref 27–31)
MCHC RBC-ENTMCNC: 33.5 G/DL — SIGNIFICANT CHANGE UP (ref 32–37)
MCHC RBC-ENTMCNC: 33.5 G/DL — SIGNIFICANT CHANGE UP (ref 32–37)
MCV RBC AUTO: 95.3 FL — SIGNIFICANT CHANGE UP (ref 81–99)
MCV RBC AUTO: 95.3 FL — SIGNIFICANT CHANGE UP (ref 81–99)
MONOCYTES # BLD AUTO: 0.47 K/UL — SIGNIFICANT CHANGE UP (ref 0.1–0.6)
MONOCYTES # BLD AUTO: 0.47 K/UL — SIGNIFICANT CHANGE UP (ref 0.1–0.6)
MONOCYTES NFR BLD AUTO: 9.3 % — SIGNIFICANT CHANGE UP (ref 1.7–9.3)
MONOCYTES NFR BLD AUTO: 9.3 % — SIGNIFICANT CHANGE UP (ref 1.7–9.3)
NEUTROPHILS # BLD AUTO: 3.37 K/UL — SIGNIFICANT CHANGE UP (ref 1.4–6.5)
NEUTROPHILS # BLD AUTO: 3.37 K/UL — SIGNIFICANT CHANGE UP (ref 1.4–6.5)
NEUTROPHILS NFR BLD AUTO: 66.7 % — SIGNIFICANT CHANGE UP (ref 42.2–75.2)
NEUTROPHILS NFR BLD AUTO: 66.7 % — SIGNIFICANT CHANGE UP (ref 42.2–75.2)
NRBC # BLD: 0 /100 WBCS — SIGNIFICANT CHANGE UP (ref 0–0)
NRBC # BLD: 0 /100 WBCS — SIGNIFICANT CHANGE UP (ref 0–0)
PLATELET # BLD AUTO: 227 K/UL — SIGNIFICANT CHANGE UP (ref 130–400)
PLATELET # BLD AUTO: 227 K/UL — SIGNIFICANT CHANGE UP (ref 130–400)
PMV BLD: 10.2 FL — SIGNIFICANT CHANGE UP (ref 7.4–10.4)
PMV BLD: 10.2 FL — SIGNIFICANT CHANGE UP (ref 7.4–10.4)
POTASSIUM SERPL-MCNC: 3.8 MMOL/L — SIGNIFICANT CHANGE UP (ref 3.5–5)
POTASSIUM SERPL-MCNC: 3.8 MMOL/L — SIGNIFICANT CHANGE UP (ref 3.5–5)
POTASSIUM SERPL-SCNC: 3.8 MMOL/L — SIGNIFICANT CHANGE UP (ref 3.5–5)
POTASSIUM SERPL-SCNC: 3.8 MMOL/L — SIGNIFICANT CHANGE UP (ref 3.5–5)
PROT SERPL-MCNC: 7 G/DL — SIGNIFICANT CHANGE UP (ref 6–8)
PROT SERPL-MCNC: 7 G/DL — SIGNIFICANT CHANGE UP (ref 6–8)
PROTHROM AB SERPL-ACNC: 11.7 SEC — SIGNIFICANT CHANGE UP (ref 9.95–12.87)
PROTHROM AB SERPL-ACNC: 11.7 SEC — SIGNIFICANT CHANGE UP (ref 9.95–12.87)
RBC # BLD: 4.29 M/UL — SIGNIFICANT CHANGE UP (ref 4.2–5.4)
RBC # BLD: 4.29 M/UL — SIGNIFICANT CHANGE UP (ref 4.2–5.4)
RBC # FLD: 13.6 % — SIGNIFICANT CHANGE UP (ref 11.5–14.5)
RBC # FLD: 13.6 % — SIGNIFICANT CHANGE UP (ref 11.5–14.5)
SODIUM SERPL-SCNC: 144 MMOL/L — SIGNIFICANT CHANGE UP (ref 135–146)
SODIUM SERPL-SCNC: 144 MMOL/L — SIGNIFICANT CHANGE UP (ref 135–146)
TROPONIN T SERPL-MCNC: <0.01 NG/ML — SIGNIFICANT CHANGE UP
TROPONIN T SERPL-MCNC: <0.01 NG/ML — SIGNIFICANT CHANGE UP
WBC # BLD: 5.05 K/UL — SIGNIFICANT CHANGE UP (ref 4.8–10.8)
WBC # BLD: 5.05 K/UL — SIGNIFICANT CHANGE UP (ref 4.8–10.8)
WBC # FLD AUTO: 5.05 K/UL — SIGNIFICANT CHANGE UP (ref 4.8–10.8)
WBC # FLD AUTO: 5.05 K/UL — SIGNIFICANT CHANGE UP (ref 4.8–10.8)

## 2023-11-17 PROCEDURE — 80053 COMPREHEN METABOLIC PANEL: CPT

## 2023-11-17 PROCEDURE — 70551 MRI BRAIN STEM W/O DYE: CPT

## 2023-11-17 PROCEDURE — 36415 COLL VENOUS BLD VENIPUNCTURE: CPT

## 2023-11-17 PROCEDURE — 82607 VITAMIN B-12: CPT

## 2023-11-17 PROCEDURE — 0042T: CPT | Mod: MA

## 2023-11-17 PROCEDURE — 84443 ASSAY THYROID STIM HORMONE: CPT

## 2023-11-17 PROCEDURE — 84436 ASSAY OF TOTAL THYROXINE: CPT

## 2023-11-17 PROCEDURE — 85025 COMPLETE CBC W/AUTO DIFF WBC: CPT

## 2023-11-17 PROCEDURE — 99285 EMERGENCY DEPT VISIT HI MDM: CPT

## 2023-11-17 PROCEDURE — 70496 CT ANGIOGRAPHY HEAD: CPT | Mod: 26,MA

## 2023-11-17 PROCEDURE — 70498 CT ANGIOGRAPHY NECK: CPT | Mod: 26,MA

## 2023-11-17 PROCEDURE — 83036 HEMOGLOBIN GLYCOSYLATED A1C: CPT

## 2023-11-17 PROCEDURE — 80061 LIPID PANEL: CPT

## 2023-11-17 PROCEDURE — 83735 ASSAY OF MAGNESIUM: CPT

## 2023-11-17 PROCEDURE — 71045 X-RAY EXAM CHEST 1 VIEW: CPT | Mod: 26

## 2023-11-17 PROCEDURE — 82746 ASSAY OF FOLIC ACID SERUM: CPT

## 2023-11-17 PROCEDURE — 70450 CT HEAD/BRAIN W/O DYE: CPT | Mod: 26,MA,XU

## 2023-11-17 RX ORDER — ONDANSETRON 8 MG/1
4 TABLET, FILM COATED ORAL ONCE
Refills: 0 | Status: COMPLETED | OUTPATIENT
Start: 2023-11-17 | End: 2023-11-17

## 2023-11-17 RX ADMIN — ONDANSETRON 4 MILLIGRAM(S): 8 TABLET, FILM COATED ORAL at 19:29

## 2023-11-17 NOTE — STROKE CODE NOTE - MRS SCORE
(1) No significant disability. Able to carry out all usual activities, despite some symptoms. (2) Slight disablitiy.  Able to look after own affairs without assistance, but unable to carry out all previous activities.

## 2023-11-17 NOTE — ED ADULT TRIAGE NOTE - CHIEF COMPLAINT QUOTE
Pt c/o new onset change in vision, loss of balance and trembling sensation.. Pt states this started a day ago, but got much worse this morning.

## 2023-11-17 NOTE — STROKE CODE NOTE - NIH STROKE SCALE: 10. DYSARTHRIA, QM
NEONATOLOGY DAILY PROGRESS NOTE      Subjective     Boy Sienna Urena is a 3 month old old former Gestational Age: 27w0d, date of Birth 2021, birthweight 1350 g male infant admitted 2023  1:01 PM and was a   Outborn baby.    Corrected Gestational Age: 42w0d    Boy Sienna Urena requires Critical Care for  IVH . With the need for continuous cardiorespiratory monitoring, monitoring of feeding tolerance, and temperature control.    Overnight Events  Intermittent tachypnea and tachycardia overnight but trend appears improved since patient was made NG only.     Objective     Vital signs reviewed  Visit Vitals  BP 85/47 (BP Location: LLE - Left lower extremity)   Pulse 150   Temp 98.1 °F (36.7 °C) (Axillary)   Resp 30   Ht 19\" (48.3 cm)   Wt (!) 4505 g   HC 38.5 cm (15.16\")   SpO2 95%   BMI 19.34 kg/m²        Current Weight (!) 4505 g (23 1200)   Most recent weights:  Weight    23 0900 23 2100 23 1800 23 1200   Weight: (!) 4325 g (!) 4370 g (!) 4495 g (!) 4505 g     Weight Change Since Birth: 234%       Apnea/Bradycardia/Desaturation in last 24 hours  None       Lines,Tube Drains    Intubation  ETT Depth of Insertion: Not Applicable 3/23/2023  Necessity/Indication: Not Intubated  Readiness for Extubation discussed: N/A 3/23/2023    Urinary Catheter  Necessity/Indication: Not Catheterized  Continued need for Urinary Catheter discussed: N/A 3/23/2023    Central Line  Type of Central Line: No Lines Present      Necessity/Indication: IV Drugs, Limited Peripheral Access  Continued need for Central Line discussed : Yes 3/23/2023  S/p PICC -3//7    Chest Tube  No 3/23/2023    Surgical Drains  subgaleal drain placed 1/27/23  3/23/2023  S/p ommaya -    Fluids  Based off a Dosing Weight: 4500 g    Intake/Output:    Intake/Output        0700   0659    NG/GT (mL/kg) 688  (152.72)    Total Intake(mL/kg) 688 (152.72)    Net +688         Urine Occurrence 8 x        I/O this shift:  In: 258 [NG/GT:258]  Out: -       Urine: Urine Occurrence  Min: 1  Max: 1 Last Stool: 1 (03/21/23 1800) mixed  UOP: see I/O table    Labs (Last 24 hours)  No results found for this or any previous visit (from the past 24 hour(s)).     No results found for: BILIRUBIN         Medications  Current Facility-Administered Medications   Medication   • simethicone (MYLICON) 40 MG/0.6ML drops 20 mg   • bacitracin ointment   • cyclopentolate-phenylephrine (CYCLOMYDRIL) 0.2-1 % ophthalmic solution 1 drop   • pediatric multivitamin with iron (POLY-VI-SOL WITH IRON) oral solution 1 mL          Vitals    24 Hour Range   Temperature   Temp  Min: 97.9 °F (36.6 °C)  Max: 98.4 °F (36.9 °C)   Pulse   Pulse  Min: 110  Max: 161   Respiratory   Resp  Min: 25  Max: 71   Blood Pressure   BP  Min: 85/47  Max: 105/41   Pulse Oximetry    SpO2  Min: 95 %  Max: 100 %       Physical Exam  Vitals signs reviewed.   Constitutional:       Appearance: Alert, Active,Normal appearance.   HENT:   Head: soft, flat anterior fontanelle; slightly flattened left sided plagiocephaly.  shunt incision site appears C/D/I. NG in place.   Eyes open, Normal Conjunctiva.       Palate intact     Regression of pointing at upper helical rim of right ear.   Cardiovascular:      Normal rate and regular rhythm.      Normal peripheral pulses  Pulmonary:      Normal breathing effort. No Grunting. No Retractions. No nasal flaring. Intermit. tachypnea     Normal breath sounds. CTA B/L  Abdominal:      Soft and flat     No palpable masses, No hepatosplenomegaly  Genitourinary:     Deferred  Musculoskeletal:      Normal range of motion.    Skin:     Warm, well-perfused     Normal turgor  Neurological:      No facial asymmetry.      Normal Passive and active Tone    Assessment & Plan  by system     Former Gestational Age: 27w0d male infant, now corrected to  42w0d    Fluids, Electrolytes and Nutrition:  Assessment:   Extremely  infant    Current:  - TF ~153  - BM 20kcal/oz 86mL Q3 NG only   - PVS + Fe 1 ml Q 24H  -Video swallow 3/22: severe oropharyngeal dysphagia with penetration and aspiration on thin and thickened liquids.     Plan:  -continue. feedings 86 ml q3h (TF~153 ml/kg/day)   -Continue supplements as above  - ST consult, on consult, follow up on recommendations, recommend repeat feeding evaluation in 2-3 months in the outpatient setting      Respiratory System:  Assessment: Respiratory Distress Syndrome (RDS)   - Surfactant: Given: 1st dose, date: , time: 0100   - Intubated for OR extubated post surgery  - s/p HFNC    - Current support settings:  RA since 23    Plan:  - monitor clinically    Apnea/Bradycardia/Desaturations:  Assessment: None    - Last Apnea:   ;    - Intervention: Self limiting (23)  - Last Bradycardia: length each event lasted (in sec) over the past 24 hours:  ; Alarm Count Bradycardia: 1  - Interventions: Intervention: Self limiting (23)      Plan:  - Monitor clinically for spells     Cardio Vascular System:   Assessment: PDA (resolved) h/o PPHN   -  echo with very small PDA with continuous L to R shunt. Peak gradient 40mmHg. Small PFO L to R shunt by color flow. Small linear echodensity in descending abdominal aorta - likely represents a fibrin cast from prior UAC. Normal valves and biventricular function.  -  ECHO: Suspected small PFO with no other intracardiac shunting noted. 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 Craig,  does not need to be replaced per plastics note from 3/13  - 2/13: new large cradle placed on right ear with medium retractor  -As per Plastic Surgery recs 2/22: reinforced right earwell with steri strips. Replaced with mini retractor.   -2/27: new large cradle placed on right ear with large retractor  -3/20: Earwell fell off, does not need to be replaced per plastics, plastics signed off    Plan:  - Monitor clinically.     Gastrointestinal System:  Assessment: None  - No active issues    Plan:  - Monitor clinically    Genitourinary System:  Assessment: None  - No active issues    Plan:  - Monitor clinically    Hematology:  Assessment: None    Baby's blood type is A positive;  Dustin: Negative  Maternal blood type is This patient's mother is not on file.  Last Bilirubin: No results found for: BILIRUBIN  Last Hg:   HGB (g/dL)   Date Value   03/04/2023 9.6     S/p PRBC 1/26/23    Plan:  - CBC prn  - Monitor clinically    Infectious Disease:  Assessment:  CSF shunt infection , clinically well appearing  - CSF culture from ommaya tap 2/20: CSF Protein 432, Glucose 11, 9 nucleated cells, 12 RBC, xanthochromia present. Rare E-Coli, pan sensitive. 2/21: CRP 0.5, procal 0.07,  CBC WNL  -blood culture 2/21 NGTD  - CSF culture/ ommaya catheter tip culture, scalp tissue culture, fungal culture from 2/22 NGTD. Subsequent 3/1 and 3/3 CSF cultures without growth  - ceftazidime 180mg Q8hrs (2/21-2/22), then ampicillin at meningitic dosing 2/22-3/3  - Fever 3/4. Started on Vancomycin and Ceftazadine for 48 hour rule-out. Blood cx NGTD x 4 days  -S/P 24 hours post-op Ancef 3/6-3/7  -S/P Nystatin 100,000 units (2/21-3/10) for thrush     Plan:  -Monitor clinically     Endocrine System:  Assessment: None  - No active issues    Plan:  - Monitor clinically    Central Nervous System:  Assessment: IVH - grade III (L)/ Grade IV ( R) , s/p  shunt, post-op pain control  - 1/25 MRI: \"FINDINGS: there is severe supratentorial hydrocephalus  suggesting aqueductal stenosis.  The left lateral ventricle is more dilated than the right.  Septum pellucidum is present.  Appearance of the brain surface suggests prematurity with white gyri and shallow sulci.     Due to ventricular dilatation there is no extra-axial CSF space inferior to  the 3rd ventricle.  Within the 3rd ventricle there is no CSF flow artifact.   Posterior fossa is small.  Cerebellar tonsils are not displaced.   IMPRESSION:  Severe supratentorial hydrocephalus line aqueductal stenosis.\"  - : septostomy, clot evacuation, placement of left frontal ommaya reservoir  - : Ommaya shunt tapped.   - : Ommaya shunt removed (+ csf culture)  - 3/1: worsening ventriculomegaly; trans-fontanelle ventricular tap by neurosurgery. Tap repeated 3/3.  -OR CSF Sample 3/6: NGTD x 2 days   3/20 MRI shunt: No interval change.  Supratentorial ventriculomegaly and mildly prominent superficial subarachnoid spaces are unchanged.     Plan:   -apply Bacitracin to incision BID.  - Daily HC   -No further positioning restrictions.     Ophthalmology:  Assessment: Retinopathy of Prematurity - bilateral  - : immature zone 2 OU  - 23: immature zone 2 OU   -3/17/23: immature zone 1 OU    Plan:   - eye exam 2 weeks (week of 3/30)    Genetics:  Assessment: None  -No active issues    Plan:  - Monitor clinically    Musculo Skeletal:  Assessment: None  -No active issues    Plan:  - Monitor clinically    Skin:  Assessment:  -No active issues    Plan:  - Monitor clinically    Other:     Therapies:   OT  PT  Speech      Research Studies:    - H-Hewlett Study - Not Eligible          Screenings & Procedures     1) Immunizations:   Most Recent Immunizations   Administered Date(s) Administered   • DTaP/Hep B/IPV 2023   • Hep B, adolescent or pediatric 01/10/2023   • Hib (PRP-OMP) 2023   • Pneumococcal Conjugate 13 Valent Vacc (Prevnar 13) 2023     2) [place] Hearing Test:      3)  ROP Eye Exam  Needed?:   Yes    4) Car Seat Screen:      5) CCHD Screening:   Screening complete:    Right hand reading %:    Foot reading %:    CHD:      6) Circumcision:      7) [place] Screen- date drawn (most recent results):        Last 3 results:        NBS at admission: Date:  normal      NBS at 48-72 HOL:  Date :12/10 abnormal on TPN      NBS at 28 DOL or prior to discharge: Date:  LFA   LFA  23 LFA    8) Is patient a Synagis Candidate:   ( if yes, see Immunizations above for date of administration)      Parents plan to follow-up as an outpatient following discharge home with - TBD    I have spoken with the nursing staff and the healthcare team and reviewed findings and plan of management.    Mother regularly updated by telephone.    Assessment: Being actively managed for the following   Patient Active Problem List   Diagnosis   • Intraventricular (nontraumatic) hemorrhage, grade 3, of    • Intraventricular hemorrhage of , grade IV   • PDA (patent ductus arteriosus)   • Post-hemorrhagic hydrocephalus (CMD)   • Slow feeding in    • S/P  shunt     Abilio Vasquez DO  Pediatric Resident, PGY-3  Please contact via [employer]    Patient was evaluated by Dr Vasquez and myself. I reviewed the EMR, pertinent physical exam findings, and multidisciplinary team recommendations and discussed plan with staff.  I agree with the above assessment and plan.     Keven Banks M.D.  Attending Neonatoloigst        (0) Normal

## 2023-11-17 NOTE — ED PROVIDER NOTE - ATTENDING CONTRIBUTION TO CARE
81 year F, never smoker with PMH of CAD S/P PCI X3 2007, 2009, 2015, HTN, GERD, Hysterectomy ?ovarian ca, TKR 1/2022  Patient presents for evaluation of dizziness, off balance, bilateral blurry vision.  Symptoms started several days ago.  Patient saw her primary care doctor and had an MRI done on Wednesday.  Symptoms never completely went away.  Symptoms worsen overnight, and in the morning.  No head injury.  No nausea no vomiting.  No chest pain or shortness of breath.  No extremity numbness or weakness.    stroke code called at triage     vss  gen- NAD, aaox3  card-rrr  lungs-ctab, no wheezing or rhonchi  abd-sntnd, no guarding or rebound  neuro- full str/sensation, normal speech, CN II-XII grossly intact 83 year F, never smoker with PMH of CAD S/P PCI X3 2007, 2009, 2015, HTN, GERD, Hysterectomy ?ovarian ca, TKR 1/2022  Patient presents for evaluation of dizziness, off balance, bilateral blurry vision.  Symptoms started several days ago.  Patient saw her primary care doctor and had an MRI done on Wednesday.  Symptoms never completely went away.  Symptoms worsen overnight, and in the morning.  No head injury.  No nausea no vomiting.  No chest pain or shortness of breath.  No extremity numbness or weakness.    stroke code called at triage     vss  gen- NAD, aaox3  card-rrr  lungs-ctab, no wheezing or rhonchi  abd-sntnd, no guarding or rebound  neuro- full str/sensation, normal speech, CN II-XII grossly intact 83 year F, never smoker with PMH of CAD S/P PCI X3 2007, 2009, 2015, HTN, GERD, Hysterectomy ?ovarian ca, TKR 1/2022  Patient presents for evaluation of dizziness, off balance, bilateral blurry vision.  Symptoms started several days ago+.  Patient saw her primary care doctor and had an MRI done on Wednesday.  Symptoms never completely went away.  Symptoms worsen overnight, and in the morning.  No head injury.  No nausea no vomiting.  No chest pain or shortness of breath.  No extremity numbness or weakness.    stroke code called at triage     vss  gen- NAD, aaox3  card-rrr  lungs-ctab, no wheezing or rhonchi  abd-sntnd, no guarding or rebound  neuro- LUE/LLE drift otherwise full str/sensation, normal speech, CN II-XII grossly intact, normal FTN

## 2023-11-17 NOTE — CONSULT NOTE ADULT - ASSESSMENT
Assessment:  83 year F, never smoker with PMH of CAD S/P PCI X3 2007, 2009, 2015 (on ASA), HTN, GERD, Hysterectomy ?ovarian ca, TKR 1/2022 presents to the ED as a stroke code for dizziness, off balance, bilateral blurry vision, b/l LE weakness for several days which worsened in the past 3 days. On exam, patient has LUE, LLE drift, decreased sensation on the left. Patient recently had MRI done outpatient on Wed. CTH neg for acute findings, Incidentally noted is a stable extra-axial, irregular calcification, measuring 1 cm, on the left side at the level of the foramen magnum. This is unchanged when compared previous studies dated back to 2018. CTA  no LVO. CTP no mismatch. Discussed case with Dr. Martin at time of stroke code. Patient is not a TNK candidate as out of window.     Patient's symptoms has been persistent for 2 weeks, worsened in the past couple of days. Could be related to previous ischemic event, r/o infectious etiology, orthostats    Recommendations:  -Otoscope/opthamoscope exam in ED  -Can go to medicine for admission   -Obtain MRI B records  -MRI B non contrast, can consider MR c spine if MRI brain is inconclusive   -Can check b12, folate, tsh ,a1c, lipid panel  -Can continue home ASA 81 mg daily   -Check orthostats  -Infectious workup  -TTE  -Rest of care as per primary team     Patient discussed with Dr. Cedillo. Pending attending attestation to follow with final recommendations.  Assessment:  83 year F, never smoker with PMH of CAD S/P PCI X3 2007, 2009, 2015 (on ASA), HTN, GERD, Hysterectomy ?ovarian ca, TKR 1/2022 presents to the ED as a stroke code for dizziness, off balance, bilateral blurry vision, b/l LE weakness for several days which worsened in the past 3 days. On exam, patient has LUE, LLE drift, decreased sensation on the left. Patient recently had MRI done outpatient on Wed. CTH neg for acute findings, Incidentally noted is a stable extra-axial, irregular calcification, measuring 1 cm, on the left side at the level of the foramen magnum. This is unchanged when compared previous studies dated back to 2018. CTA  no LVO. CTP no mismatch. Discussed case with Dr. Martin at time of stroke code. Patient is not a TNK candidate as out of window.     Patient's symptoms has been persistent for 2 weeks, worsened in the past couple of days. Could be related to previous ischemic event, r/o infectious etiology, orthostats    Recommendations:  -Otoscope/opthamoscope exam in ED  -Can go to medicine for admission   -Obtain MRI B records  -MRI B non contrast, can consider MR c spine if MRI brain is inconclusive   -Can check b12, folate, tsh ,a1c, lipid panel  -Can continue home ASA 81 mg daily if no contraindications   -Check orthostats  -Infectious workup  -TTE  -Rest of care as per primary team     Patient discussed with Dr. Cedillo. Pending attending attestation to follow with final recommendations.

## 2023-11-17 NOTE — ED ADULT NURSE NOTE - OBJECTIVE STATEMENT
Pt is a 83 year old female complaining of bilateral eye blurriness x3 days that has worsened overnight.

## 2023-11-17 NOTE — CONSULT NOTE ADULT - ATTENDING COMMENTS
Patient has 4/5 weakness in bilateral LEs (feels its a little different from her baseline; reports bilateral knee replacement 2 years ago) without sensory deficits. Narrow based gait with some lightheadeness. EOMI with VFF. R ear hearing loss (feels that its full). Otherwise normal exam. CTH/CTA with no acute concerning findings.     - If unable to obtain outside MRI records, would consider repeat MRI brain w/o contrast to assess for acute infarction  - ENT evaluation for R hearing loss; if concerns found on their evaluation, may consider MRI brain +/- IAC protocol  - Rule out infectious/metabolic etiology  - Doubt cervical pathology based on current exam

## 2023-11-17 NOTE — ED PROVIDER NOTE - OBJECTIVE STATEMENT
PT is a 83 year F, never smoker with PMH of CAD S/P PCI X3 2007, 2009, 2015, HTN, GERD, Hysterectomy ?ovarian ca, TKR 1/2022. Patient presents for evaluation of dizziness, off balance, bilateral blurry vision.  Symptoms started several days ago.  Patient saw her primary care doctor and had an MRI done on Wednesday.  Symptoms never completely went away.  Symptoms worsen overnight, and in the morning.  No head injury.  No nausea no vomiting.  No chest pain or shortness of breath.  No extremity numbness or weakness.

## 2023-11-17 NOTE — ED PROVIDER NOTE - CLINICAL SUMMARY MEDICAL DECISION MAKING FREE TEXT BOX
Throughout ED observation period, pt remained clinically and hemodynamically stable.  in ed, neuro sx improving. L sided sx resolved on serial exam  case d/w neuro who rec medicine telemetry  admission w/ neuro as consult

## 2023-11-17 NOTE — ED PROVIDER NOTE - PROGRESS NOTE DETAILS
throughout ED period, pt states sx markedly improved. previously noted drift resolved. no focal numbness/weakness/tremulousness prior to admission

## 2023-11-18 LAB
A1C WITH ESTIMATED AVERAGE GLUCOSE RESULT: 5.4 % — SIGNIFICANT CHANGE UP (ref 4–5.6)
A1C WITH ESTIMATED AVERAGE GLUCOSE RESULT: 5.4 % — SIGNIFICANT CHANGE UP (ref 4–5.6)
ALBUMIN SERPL ELPH-MCNC: 4.4 G/DL — SIGNIFICANT CHANGE UP (ref 3.5–5.2)
ALBUMIN SERPL ELPH-MCNC: 4.4 G/DL — SIGNIFICANT CHANGE UP (ref 3.5–5.2)
ALP SERPL-CCNC: 71 U/L — SIGNIFICANT CHANGE UP (ref 30–115)
ALP SERPL-CCNC: 71 U/L — SIGNIFICANT CHANGE UP (ref 30–115)
ALT FLD-CCNC: 8 U/L — SIGNIFICANT CHANGE UP (ref 0–41)
ALT FLD-CCNC: 8 U/L — SIGNIFICANT CHANGE UP (ref 0–41)
ANION GAP SERPL CALC-SCNC: 12 MMOL/L — SIGNIFICANT CHANGE UP (ref 7–14)
ANION GAP SERPL CALC-SCNC: 12 MMOL/L — SIGNIFICANT CHANGE UP (ref 7–14)
AST SERPL-CCNC: 12 U/L — SIGNIFICANT CHANGE UP (ref 0–41)
AST SERPL-CCNC: 12 U/L — SIGNIFICANT CHANGE UP (ref 0–41)
BASOPHILS # BLD AUTO: 0.02 K/UL — SIGNIFICANT CHANGE UP (ref 0–0.2)
BASOPHILS # BLD AUTO: 0.02 K/UL — SIGNIFICANT CHANGE UP (ref 0–0.2)
BASOPHILS NFR BLD AUTO: 0.4 % — SIGNIFICANT CHANGE UP (ref 0–1)
BASOPHILS NFR BLD AUTO: 0.4 % — SIGNIFICANT CHANGE UP (ref 0–1)
BILIRUB SERPL-MCNC: 0.8 MG/DL — SIGNIFICANT CHANGE UP (ref 0.2–1.2)
BILIRUB SERPL-MCNC: 0.8 MG/DL — SIGNIFICANT CHANGE UP (ref 0.2–1.2)
BUN SERPL-MCNC: 13 MG/DL — SIGNIFICANT CHANGE UP (ref 10–20)
BUN SERPL-MCNC: 13 MG/DL — SIGNIFICANT CHANGE UP (ref 10–20)
CALCIUM SERPL-MCNC: 9.2 MG/DL — SIGNIFICANT CHANGE UP (ref 8.4–10.5)
CALCIUM SERPL-MCNC: 9.2 MG/DL — SIGNIFICANT CHANGE UP (ref 8.4–10.5)
CHLORIDE SERPL-SCNC: 103 MMOL/L — SIGNIFICANT CHANGE UP (ref 98–110)
CHLORIDE SERPL-SCNC: 103 MMOL/L — SIGNIFICANT CHANGE UP (ref 98–110)
CHOLEST SERPL-MCNC: 176 MG/DL — SIGNIFICANT CHANGE UP
CHOLEST SERPL-MCNC: 176 MG/DL — SIGNIFICANT CHANGE UP
CO2 SERPL-SCNC: 26 MMOL/L — SIGNIFICANT CHANGE UP (ref 17–32)
CO2 SERPL-SCNC: 26 MMOL/L — SIGNIFICANT CHANGE UP (ref 17–32)
CREAT SERPL-MCNC: 0.7 MG/DL — SIGNIFICANT CHANGE UP (ref 0.7–1.5)
CREAT SERPL-MCNC: 0.7 MG/DL — SIGNIFICANT CHANGE UP (ref 0.7–1.5)
EGFR: 86 ML/MIN/1.73M2 — SIGNIFICANT CHANGE UP
EGFR: 86 ML/MIN/1.73M2 — SIGNIFICANT CHANGE UP
EOSINOPHIL # BLD AUTO: 0.2 K/UL — SIGNIFICANT CHANGE UP (ref 0–0.7)
EOSINOPHIL # BLD AUTO: 0.2 K/UL — SIGNIFICANT CHANGE UP (ref 0–0.7)
EOSINOPHIL NFR BLD AUTO: 3.9 % — SIGNIFICANT CHANGE UP (ref 0–8)
EOSINOPHIL NFR BLD AUTO: 3.9 % — SIGNIFICANT CHANGE UP (ref 0–8)
ESTIMATED AVERAGE GLUCOSE: 108 MG/DL — SIGNIFICANT CHANGE UP (ref 68–114)
ESTIMATED AVERAGE GLUCOSE: 108 MG/DL — SIGNIFICANT CHANGE UP (ref 68–114)
FOLATE SERPL-MCNC: 8.1 NG/ML — SIGNIFICANT CHANGE UP
FOLATE SERPL-MCNC: 8.1 NG/ML — SIGNIFICANT CHANGE UP
GLUCOSE SERPL-MCNC: 88 MG/DL — SIGNIFICANT CHANGE UP (ref 70–99)
GLUCOSE SERPL-MCNC: 88 MG/DL — SIGNIFICANT CHANGE UP (ref 70–99)
HCT VFR BLD CALC: 40.7 % — SIGNIFICANT CHANGE UP (ref 37–47)
HCT VFR BLD CALC: 40.7 % — SIGNIFICANT CHANGE UP (ref 37–47)
HDLC SERPL-MCNC: 72 MG/DL — SIGNIFICANT CHANGE UP
HDLC SERPL-MCNC: 72 MG/DL — SIGNIFICANT CHANGE UP
HGB BLD-MCNC: 13.5 G/DL — SIGNIFICANT CHANGE UP (ref 12–16)
HGB BLD-MCNC: 13.5 G/DL — SIGNIFICANT CHANGE UP (ref 12–16)
IMM GRANULOCYTES NFR BLD AUTO: 0.2 % — SIGNIFICANT CHANGE UP (ref 0.1–0.3)
IMM GRANULOCYTES NFR BLD AUTO: 0.2 % — SIGNIFICANT CHANGE UP (ref 0.1–0.3)
LIPID PNL WITH DIRECT LDL SERPL: 94 MG/DL — SIGNIFICANT CHANGE UP
LIPID PNL WITH DIRECT LDL SERPL: 94 MG/DL — SIGNIFICANT CHANGE UP
LYMPHOCYTES # BLD AUTO: 1.61 K/UL — SIGNIFICANT CHANGE UP (ref 1.2–3.4)
LYMPHOCYTES # BLD AUTO: 1.61 K/UL — SIGNIFICANT CHANGE UP (ref 1.2–3.4)
LYMPHOCYTES # BLD AUTO: 31.2 % — SIGNIFICANT CHANGE UP (ref 20.5–51.1)
LYMPHOCYTES # BLD AUTO: 31.2 % — SIGNIFICANT CHANGE UP (ref 20.5–51.1)
MAGNESIUM SERPL-MCNC: 2.3 MG/DL — SIGNIFICANT CHANGE UP (ref 1.8–2.4)
MAGNESIUM SERPL-MCNC: 2.3 MG/DL — SIGNIFICANT CHANGE UP (ref 1.8–2.4)
MCHC RBC-ENTMCNC: 32.5 PG — HIGH (ref 27–31)
MCHC RBC-ENTMCNC: 32.5 PG — HIGH (ref 27–31)
MCHC RBC-ENTMCNC: 33.2 G/DL — SIGNIFICANT CHANGE UP (ref 32–37)
MCHC RBC-ENTMCNC: 33.2 G/DL — SIGNIFICANT CHANGE UP (ref 32–37)
MCV RBC AUTO: 98.1 FL — SIGNIFICANT CHANGE UP (ref 81–99)
MCV RBC AUTO: 98.1 FL — SIGNIFICANT CHANGE UP (ref 81–99)
MONOCYTES # BLD AUTO: 0.54 K/UL — SIGNIFICANT CHANGE UP (ref 0.1–0.6)
MONOCYTES # BLD AUTO: 0.54 K/UL — SIGNIFICANT CHANGE UP (ref 0.1–0.6)
MONOCYTES NFR BLD AUTO: 10.5 % — HIGH (ref 1.7–9.3)
MONOCYTES NFR BLD AUTO: 10.5 % — HIGH (ref 1.7–9.3)
NEUTROPHILS # BLD AUTO: 2.78 K/UL — SIGNIFICANT CHANGE UP (ref 1.4–6.5)
NEUTROPHILS # BLD AUTO: 2.78 K/UL — SIGNIFICANT CHANGE UP (ref 1.4–6.5)
NEUTROPHILS NFR BLD AUTO: 53.8 % — SIGNIFICANT CHANGE UP (ref 42.2–75.2)
NEUTROPHILS NFR BLD AUTO: 53.8 % — SIGNIFICANT CHANGE UP (ref 42.2–75.2)
NON HDL CHOLESTEROL: 104 MG/DL — SIGNIFICANT CHANGE UP
NON HDL CHOLESTEROL: 104 MG/DL — SIGNIFICANT CHANGE UP
NRBC # BLD: 0 /100 WBCS — SIGNIFICANT CHANGE UP (ref 0–0)
NRBC # BLD: 0 /100 WBCS — SIGNIFICANT CHANGE UP (ref 0–0)
PLATELET # BLD AUTO: 221 K/UL — SIGNIFICANT CHANGE UP (ref 130–400)
PLATELET # BLD AUTO: 221 K/UL — SIGNIFICANT CHANGE UP (ref 130–400)
PMV BLD: 10.1 FL — SIGNIFICANT CHANGE UP (ref 7.4–10.4)
PMV BLD: 10.1 FL — SIGNIFICANT CHANGE UP (ref 7.4–10.4)
POTASSIUM SERPL-MCNC: 3.9 MMOL/L — SIGNIFICANT CHANGE UP (ref 3.5–5)
POTASSIUM SERPL-MCNC: 3.9 MMOL/L — SIGNIFICANT CHANGE UP (ref 3.5–5)
POTASSIUM SERPL-SCNC: 3.9 MMOL/L — SIGNIFICANT CHANGE UP (ref 3.5–5)
POTASSIUM SERPL-SCNC: 3.9 MMOL/L — SIGNIFICANT CHANGE UP (ref 3.5–5)
PROT SERPL-MCNC: 6.5 G/DL — SIGNIFICANT CHANGE UP (ref 6–8)
PROT SERPL-MCNC: 6.5 G/DL — SIGNIFICANT CHANGE UP (ref 6–8)
RBC # BLD: 4.15 M/UL — LOW (ref 4.2–5.4)
RBC # BLD: 4.15 M/UL — LOW (ref 4.2–5.4)
RBC # FLD: 13.7 % — SIGNIFICANT CHANGE UP (ref 11.5–14.5)
RBC # FLD: 13.7 % — SIGNIFICANT CHANGE UP (ref 11.5–14.5)
SODIUM SERPL-SCNC: 141 MMOL/L — SIGNIFICANT CHANGE UP (ref 135–146)
SODIUM SERPL-SCNC: 141 MMOL/L — SIGNIFICANT CHANGE UP (ref 135–146)
T4 AB SER-ACNC: 7.4 UG/DL — SIGNIFICANT CHANGE UP (ref 4.6–12)
T4 AB SER-ACNC: 7.4 UG/DL — SIGNIFICANT CHANGE UP (ref 4.6–12)
TRIGL SERPL-MCNC: 49 MG/DL — SIGNIFICANT CHANGE UP
TRIGL SERPL-MCNC: 49 MG/DL — SIGNIFICANT CHANGE UP
TSH SERPL-MCNC: 1.45 UIU/ML — SIGNIFICANT CHANGE UP (ref 0.27–4.2)
TSH SERPL-MCNC: 1.45 UIU/ML — SIGNIFICANT CHANGE UP (ref 0.27–4.2)
VIT B12 SERPL-MCNC: 234 PG/ML — SIGNIFICANT CHANGE UP (ref 232–1245)
VIT B12 SERPL-MCNC: 234 PG/ML — SIGNIFICANT CHANGE UP (ref 232–1245)
WBC # BLD: 5.16 K/UL — SIGNIFICANT CHANGE UP (ref 4.8–10.8)
WBC # BLD: 5.16 K/UL — SIGNIFICANT CHANGE UP (ref 4.8–10.8)
WBC # FLD AUTO: 5.16 K/UL — SIGNIFICANT CHANGE UP (ref 4.8–10.8)
WBC # FLD AUTO: 5.16 K/UL — SIGNIFICANT CHANGE UP (ref 4.8–10.8)

## 2023-11-18 PROCEDURE — 99222 1ST HOSP IP/OBS MODERATE 55: CPT

## 2023-11-18 PROCEDURE — 99223 1ST HOSP IP/OBS HIGH 75: CPT

## 2023-11-18 RX ORDER — AMLODIPINE BESYLATE AND BENAZEPRIL HYDROCHLORIDE 10; 20 MG/1; MG/1
1 CAPSULE ORAL
Qty: 0 | Refills: 0 | DISCHARGE

## 2023-11-18 RX ORDER — ENOXAPARIN SODIUM 100 MG/ML
40 INJECTION SUBCUTANEOUS EVERY 24 HOURS
Refills: 0 | Status: DISCONTINUED | OUTPATIENT
Start: 2023-11-18 | End: 2023-11-19

## 2023-11-18 RX ORDER — ASPIRIN/CALCIUM CARB/MAGNESIUM 324 MG
81 TABLET ORAL DAILY
Refills: 0 | Status: DISCONTINUED | OUTPATIENT
Start: 2023-11-18 | End: 2023-11-19

## 2023-11-18 RX ORDER — CALCIUM CARBONATE 500(1250)
1 TABLET ORAL ONCE
Refills: 0 | Status: COMPLETED | OUTPATIENT
Start: 2023-11-18 | End: 2023-11-18

## 2023-11-18 RX ORDER — ACETAMINOPHEN 500 MG
650 TABLET ORAL EVERY 6 HOURS
Refills: 0 | Status: DISCONTINUED | OUTPATIENT
Start: 2023-11-18 | End: 2023-11-19

## 2023-11-18 RX ORDER — ONDANSETRON 8 MG/1
4 TABLET, FILM COATED ORAL ONCE
Refills: 0 | Status: COMPLETED | OUTPATIENT
Start: 2023-11-18 | End: 2023-11-18

## 2023-11-18 RX ORDER — PANTOPRAZOLE SODIUM 20 MG/1
40 TABLET, DELAYED RELEASE ORAL
Refills: 0 | Status: DISCONTINUED | OUTPATIENT
Start: 2023-11-18 | End: 2023-11-19

## 2023-11-18 RX ADMIN — Medication 81 MILLIGRAM(S): at 12:15

## 2023-11-18 RX ADMIN — Medication 1 TABLET(S): at 21:24

## 2023-11-18 RX ADMIN — PANTOPRAZOLE SODIUM 40 MILLIGRAM(S): 20 TABLET, DELAYED RELEASE ORAL at 05:47

## 2023-11-18 NOTE — CONSULT NOTE ADULT - ASSESSMENT
Pt is a 84yo F admitted for stroke workup after patient presented  for dizziness, blurry vision; ENT consulted for R sided hearing loss.    ·	Cannot rule out SNHL with Dias test; recommend starting high dose steroids. 60mg prednisone   ·	Pt will need Audiogram (inpt v outpt)  ·	CTH: The visualized mastoid complexes appear free of acute disease  ·	d/w attng

## 2023-11-18 NOTE — PATIENT PROFILE ADULT - FUNCTIONAL ASSESSMENT - BASIC MOBILITY 6.
3-calculated by average/Not able to assess (calculate score using [facility] averaging method)

## 2023-11-18 NOTE — H&P ADULT - ASSESSMENT
The pt is an 83 year F, never smoker with PMH of CAD S/P PCI X3 2007, 2009, 2015 (on ASA), HTN, GERD, Anmol's esophagus, Hysterectomy ?ovarian ca, TKR 1/2022 presents for evaluation of dizziness, impaired balance/vertigo, bilateral blurry vision since October 10th.     #Vertigo  #Central (possible stroke) vs peripheral   #Anxiety   -pt describes rt. sided new onset hearing loss w/ aural fullness  -HINTS exam negative.   -imaging negative   -pt was taking lexapro around Oct 10th and stopped taking med after her sx began  -Obtain MRI B records  -MRI B non contrast, can consider MR c spine if MRI brain is inconclusive   -ordered b12, folate, tsh ,a1c, lipid panel  -c/w  home ASA 81 mg daily    -Check orthostats  -TTE  -pt/ot    #Ovarian cancer  -s/p salpingoopherectomy and total hysterectomy  -f/u OP    #Anmol's esophagus  -protonix  -f/u OP w/ GI    #CAD  #s/p PCI x3  -most recent was 2015  -c/w ASA  -pt older than 75, most likely doesn't need statin for secondary prevention    #HTN  -previously was on amlodipine       #Diet: DASH  #Activity: IAT  #GI ppx: protonix  #DVT ppx: lovenox  #Dispo:tele  #Pending: Retrieval of MRI records, Med rec in AM.

## 2023-11-18 NOTE — H&P ADULT - NSHPREVIEWOFSYSTEMS_GEN_ALL_CORE
REVIEW OF SYSTEMS:    CONSTITUTIONAL: Weakness/lightheadedness. Anxiety. No fevers or chills  EYES/ENT: Blurry vision. Vertigo. Hoarse voice/sore throat.   NECK: No pain or stiffness  RESPIRATORY: No cough, wheezing, hemoptysis; Dyspnea present (greater than baseline).  CARDIOVASCULAR: No chest pain or palpitations  GASTROINTESTINAL: No abdominal or epigastric pain. No  vomiting, or hematemesis; No diarrhea or constipation. No melena or hematochezia.  GENITOURINARY: No dysuria, frequency or hematuria  NEUROLOGICAL: b/l LE weakness. Numbness b/l feet.   SKIN: No itching, rashes

## 2023-11-18 NOTE — PATIENT PROFILE ADULT - FALL HARM RISK - HARM RISK INTERVENTIONS

## 2023-11-18 NOTE — H&P ADULT - HISTORY OF PRESENT ILLNESS
Chief Complaint: multiple complaints- dizziness, vision changes    HPI: : 83 year F, never smoker with PMH of CAD S/P PCI X3 2007, 2009, 2015 (on ASA), HTN, GERD, Hysterectomy ?ovarian ca, TKR 1/2022  Patient presents for evaluation of dizziness, off balance, bilateral blurry vision for multiple days which worsened in the past 3 days. Patient reports "off balance" feeling for more than a week. Patient also reports b/l blurry vision x few weeks. Patient reports b/l LE weakness due to b/l knee replacements, left leg weaker than right. Patient recently went to her doctor for unsteady gait and had a MRI done on Wednesday Unable to locate images. Patient was told by her doctor to go to the ED given her acute on chronic symptoms. Patient also reports decreased hearing on the right.       HPI: : 83 year F, never smoker with PMH of CAD S/P PCI X3 2007, 2009, 2015 (on ASA), HTN, GERD, Anmol's esophagus, Hysterectomy ?ovarian ca, TKR 1/2022 presents for evaluation of dizziness, impaired balance/vertigo, bilateral blurry vision since October 10th. Patient reports nearly falling to one side after exiting the shower on multiple occasions. Pt fell twice in September, when her legs simply gave out while walking. Patient also reports b/l blurry vision x few weeks. Patient reports b/l LE weakness associated w/ knee stiffness secondary to b/l knee replacements, left leg weaker than right. Patient recently went to her doctor for unsteady gait and had a MRI done on Wednesday. Patient was told by her doctor to go to the ED given her acute on chronic symptoms. Patient also reports new onset decreased hearing on the right. Pt denies chest pain, orthopnea but endorses worsening SOB, anxiety, tremors of the      HPI: : 83 year F, never smoker with PMH of CAD S/P PCI X3 2007, 2009, 2015 (on ASA), HTN, GERD, Anmol's esophagus, Hysterectomy ?ovarian ca, TKR 1/2022 presents for evaluation of dizziness, impaired balance/vertigo, bilateral blurry vision since October 10th. Patient reports nearly falling to one side after exiting the shower on multiple occasions. Pt fell twice in September, when her legs simply gave out while walking. Patient also reports b/l blurry vision x few weeks. Patient reports b/l LE weakness associated w/ knee stiffness secondary to b/l knee replacements, left leg weaker than right. Patient recently went to her doctor for unsteady gait and had a MRI done on Wednesday. Patient was told by her doctor to go to the ED given her acute on chronic symptoms. Patient also reports new onset decreased hearing on the right. Pt denies chest pain, orthopnea but endorses HA (rt sided posterior), worsening SOB, anxiety, tremors of the b/l UEs, and numbness of the feet.    VS in the ED were 179/85 mm Hg, HR 86, RR 22, 98.6 degrees F, and SpO2 98% on RA.   Labs were unremarkable.    A stroke code was called in the ED.     Please obtain med rec in AM. Pt states she only takes daily vitamins,, omeprozole, and ASA 81 mg. Pharmacy is [facility] at [address] [place] [address].  CTH neg for acute findings, Incidentally noted is a stable extra-axial, irregular calcification, measuring 1 cm, on the left side at the level of the foramen magnum. This is unchanged when compared previous studies dated back to 2018. CTA  no LVO. CTP no mismatch. Patient is not a TNK candidate as out of window.    HPI: : 83 year F, never smoker with PMH of CAD S/P PCI X3 2007, 2009, 2015 (on ASA), HTN, GERD, Anmol's esophagus, Hysterectomy ?ovarian ca, TKR 1/2022 presents for evaluation of dizziness, impaired balance/vertigo, bilateral blurry vision since October 10th. Patient reports nearly falling to one side after exiting the shower on multiple occasions. Pt fell twice in September, when her legs simply gave out while walking. Patient also reports b/l blurry vision x few weeks. Patient reports b/l LE weakness associated w/ knee stiffness secondary to b/l knee replacements, left leg weaker than right. Patient recently went to her doctor for unsteady gait and had a MRI done on Wednesday. Patient was told by her doctor to go to the ED given her acute on chronic symptoms. Patient also reports new onset decreased hearing on the right. Pt denies chest pain, orthopnea but endorses HA (rt sided posterior), worsening SOB, anxiety, tremors of the b/l UEs, and numbness of the feet.    VS in the ED were 179/85 mm Hg, HR 86, RR 22, 98.6 degrees F, and SpO2 98% on RA.   Labs were unremarkable.    A stroke code was called in the ED.     Please obtain med rec in AM. Pt states she only takes daily vitamins,, omeprozole, and ASA 81 mg. Pharmacy is [facility] at [address] [place] [address].  CTH neg for acute findings, Incidentally noted is a stable extra-axial, irregular calcification, measuring 1 cm, on the left side at the level of the foramen magnum. This is unchanged when compared previous studies dated back to 2018. CTA  no LVO. CTP no mismatch. Patient is not a TNK candidate as out of window.    HPI: : 83 year F, never smoker with PMH of CAD S/P PCI X3 2007, 2009, 2015 (on ASA), HTN, GERD, Anmol's esophagus, Hysterectomy ?ovarian ca, TKR 1/2022 presents for evaluation of dizziness, impaired balance/vertigo, bilateral blurry vision since October 10th. Patient reports nearly falling to one side after exiting the shower on multiple occasions. Pt fell twice in September, when her legs simply gave out while walking. Patient also reports b/l blurry vision x few weeks. Patient reports b/l LE weakness associated w/ knee stiffness secondary to b/l knee replacements, left leg weaker than right. Patient recently went to her doctor for unsteady gait and had a MRI done on Wednesday. Patient was told by her doctor to go to the ED given her acute on chronic symptoms. Patient also reports new onset decreased hearing on the right. Pt denies chest pain, orthopnea but endorses HA (rt sided posterior), worsening SOB, anxiety, tremors of the b/l UEs, and numbness of the feet.    VS in the ED were 179/85 mm Hg, HR 86, RR 22, 98.6 degrees F, and SpO2 98% on RA.   Labs were unremarkable.    A stroke code was called in the ED.     Please obtain med rec in AM. Pt states she only takes daily vitamins,, omeprozole, and ASA 81 mg. Pharmacy is [facility] at [address] [place] [address].  CTH neg for acute findings, Incidentally noted is a stable extra-axial, irregular calcification, measuring 1 cm, on the left side at the level of the foramen magnum. This is unchanged when compared previous studies dated back to 2018. CTA  no LVO. CTP no mismatch. Patient is not a TNK candidate as out of window.

## 2023-11-18 NOTE — H&P ADULT - NSHPLABSRESULTS_GEN_ALL_CORE
LABS:  cret                        13.7   5.05  )-----------( 227      ( 17 Nov 2023 18:34 )             40.9     11-17    144  |  105  |  16  ----------------------------<  84  3.8   |  27  |  0.9    Ca    10.1      17 Nov 2023 18:34    TPro  7.0  /  Alb  4.9  /  TBili  0.5  /  DBili  x   /  AST  14  /  ALT  9   /  AlkPhos  77  11-17    PT/INR - ( 17 Nov 2023 18:34 )   PT: 11.70 sec;   INR: 1.03 ratio         PTT - ( 17 Nov 2023 18:34 )  PTT:31.5 sec    Imaging:    CT head No acute intracranial pathology.    Trace mucosal thickening of the ethmoid sinuses.    CT Perfusion The CT perfusion study demonstrates no perfusion abnormality. There is no   mismatch defect.There is no mismatch volume.    CTA HEAD:    No evidence of vessel occlusion or aneurysm.    CTA NECK:  No evidence of greater than 50% carotid or vertebral artery stenosis.

## 2023-11-18 NOTE — H&P ADULT - ATTENDING COMMENTS
Pt seen and examined on 3C this morning. Previous notes reviewed by me. History reviewed with the pt. For about 2 months, she had vertigo, imbalance and occasional blurry vision. Her PMD Dr. Nikko Wright prescribed lexapro 5mg daily. Symptoms persisted and she was tapered off lexapro.  She c/o b/l LE weakness and had falls per chart. She now c/o right ear pain and pressure with decreased hearing for the last 2 days which is worsening. NO fever, + occasional chills. She had MRI on Wednesday at [address] [place] [address] (called [phone] and awaiting callback). ROS negative  She saw ophthalmology recently and was told that her vision was unchanged.     T(F): 97.2 (11-18-23 @ 05:49), Max: 98.6 (11-17-23 @ 18:07)  HR: 65 (11-18-23 @ 05:49) (65 - 86)  BP: 139/68 (11-18-23 @ 05:49) (139/68 - 179/85)  RR: 18 (11-18-23 @ 05:49) (16 - 22)  SpO2: 98% (11-18-23 @ 05:49) (97% - 98%) on RA    POCT Blood Glucose.: 107 mg/dL (17 Nov 2023 18:13)    PHYSICAL EXAM:  GENERAL: NAD  HEAD:  Normocephalic  EYES:  conjunctiva and sclera clear  ENMT: Moist mucous membranes  NECK: Supple, No JVD  NERVOUS SYSTEM:  Alert, awake, Good concentration, EOMI, no nystagmus, pupils equal, visual acuity grossly intact,  motor UE 5/5, LE left 4/5, right 5-/5  CHEST/LUNG: CTA b/l  HEART: Regular rate and rhythm; No murmurs  ABDOMEN: Soft, Nontender, Nondistended  EXTREMITIES: No edema  SKIN: warm, dry    Consultant(s) Notes Reviewed:  [x ] YES  [ ] NO  Care Discussed with Consultants/Other Providers [ x] YES  [ ] NO  Telemetry reviewed by me    LABS:                        13.5   5.16  )-----------( 221      ( 18 Nov 2023 07:03 )             40.7     11-18    141  |  103  |  13  ----------------------------<  88  3.9   |  26  |  0.7    Ca    9.2      18 Nov 2023 07:03  Mg     2.3     11-18  TPro  6.5  /  Alb  4.4  /  TBili  0.8  /  DBili  x   /  AST  12  /  ALT  8   /  AlkPhos  71  11-18  PT/INR - ( 17 Nov 2023 18:34 )   PT: 11.70 sec;   INR: 1.03 ratio  PTT - ( 17 Nov 2023 18:34 )  PTT:31.5 sec  CARDIAC MARKERS ( 17 Nov 2023 18:34 )  x     / <0.01 ng/mL / x     / x     / x          RADIOLOGY & ADDITIONAL TESTS:  Imaging or report Personally Reviewed:  [ x] YES  [ ] NO  Care discussed with pt    84 y/o woman with PMH of CAD S/P PCI X3 2007, 2009, 2015 (on ASA), HTN, GERD, Anmol's esophagus, ovarian cancer s/p DAVIS/BSO 44 y ago, s/p TAVR in 2023 and TKR 1/2022 presented for vertigo, imbalance, falls, occasional blurred vision and now with right ear pain and decreased hearing now.    1. Vertigo, imbalance, falls and now with right sided ear pain and decreased hearing  rule out central/peripheral causes  CT brain, CTA head and neck reports reviewed  trying to obtain MRI report done as outpt - left message at number above  neuro consult appreciated - MRI of brain ([place]) to rule out infarct if unable to obtain report from outside  ENT eval for right ear pain and decreased hearing  telemetry - no events  on ASA    2. CAD s/p PCI x 3 - on ASA  3. Espino's esophagus on PPI  4. DVT prophylaxis on lovenox    full code  guarded prognosis Pt seen and examined on 3C this morning. Previous notes reviewed by me. History reviewed with the pt. For about 2 months, she had vertigo, imbalance and occasional blurry vision. Her PMD Dr. Nikko Wright prescribed lexapro 5mg daily. Symptoms persisted and she was tapered off lexapro.  She c/o b/l LE weakness and had falls per chart. She now c/o right ear pain and pressure with decreased hearing for the last 2 days which is worsening. NO fever, + occasional chills. She had MRI on Wednesday at [address] [place] [address] (called [phone] and awaiting callback). ROS negative  She saw ophthalmology recently and was told that her vision was unchanged.     T(F): 97.2 (11-18-23 @ 05:49), Max: 98.6 (11-17-23 @ 18:07)  HR: 65 (11-18-23 @ 05:49) (65 - 86)  BP: 139/68 (11-18-23 @ 05:49) (139/68 - 179/85)  RR: 18 (11-18-23 @ 05:49) (16 - 22)  SpO2: 98% (11-18-23 @ 05:49) (97% - 98%) on RA    POCT Blood Glucose.: 107 mg/dL (17 Nov 2023 18:13)    PHYSICAL EXAM:  GENERAL: NAD  HEAD:  Normocephalic  EYES:  conjunctiva and sclera clear  ENMT: Moist mucous membranes  NECK: Supple, No JVD  NERVOUS SYSTEM:  Alert, awake, Good concentration, EOMI, no nystagmus, pupils equal, visual acuity grossly intact,  motor UE 5/5, LE left 4/5, right 5-/5  CHEST/LUNG: CTA b/l  HEART: Regular rate and rhythm; No murmurs  ABDOMEN: Soft, Nontender, Nondistended  EXTREMITIES: No edema  SKIN: warm, dry    Consultant(s) Notes Reviewed:  [x ] YES  [ ] NO  Care Discussed with Consultants/Other Providers [ x] YES  [ ] NO  Telemetry reviewed by me    LABS:                        13.5   5.16  )-----------( 221      ( 18 Nov 2023 07:03 )             40.7     11-18    141  |  103  |  13  ----------------------------<  88  3.9   |  26  |  0.7    Ca    9.2      18 Nov 2023 07:03  Mg     2.3     11-18  TPro  6.5  /  Alb  4.4  /  TBili  0.8  /  DBili  x   /  AST  12  /  ALT  8   /  AlkPhos  71  11-18  PT/INR - ( 17 Nov 2023 18:34 )   PT: 11.70 sec;   INR: 1.03 ratio  PTT - ( 17 Nov 2023 18:34 )  PTT:31.5 sec  CARDIAC MARKERS ( 17 Nov 2023 18:34 )  x     / <0.01 ng/mL / x     / x     / x          RADIOLOGY & ADDITIONAL TESTS:  Imaging or report Personally Reviewed:  [ x] YES  [ ] NO  Care discussed with pt    84 y/o woman with PMH of CAD S/P PCI X3 2007, 2009, 2015 (on ASA), HTN, GERD, Anmol's esophagus, ovarian cancer s/p DAVIS/BSO 44 y ago, s/p TAVR in 2023 and TKR 1/2022 presented for vertigo, imbalance, falls, occasional blurred vision and now with right ear pain and decreased hearing now.    1. Vertigo, imbalance, falls and now with right sided ear pain and decreased hearing  rule out central/peripheral causes  CT brain, CTA head and neck reports reviewed  trying to obtain MRI report done as outpt - left message at number above  neuro consult appreciated - MRI of brain ([place]) to rule out infarct if unable to obtain report from outside  ENT eval for right ear pain and decreased hearing  telemetry - no events  on ASA    2. CAD s/p PCI x 3 - on ASA  3. Espino's esophagus on PPI  4. DVT prophylaxis on lovenox    full code  guarded prognosis Pt seen and examined on 3C this morning. Previous notes reviewed by me. History reviewed with the pt. For about 2 months, she had vertigo, imbalance and occasional blurry vision. Her PMD Dr. Nikko Wright prescribed lexapro 5mg daily. Symptoms persisted and she was tapered off lexapro.  She c/o b/l LE weakness and had falls per chart. She now c/o right ear pain and pressure with decreased hearing for the last 2 days which is worsening. NO fever, + occasional chills. She had MRI on Wednesday at [address] [place] [address] (called [phone] and awaiting callback). ROS negative  She saw ophthalmology recently and was told that her vision was unchanged.     T(F): 97.2 (11-18-23 @ 05:49), Max: 98.6 (11-17-23 @ 18:07)  HR: 65 (11-18-23 @ 05:49) (65 - 86)  BP: 139/68 (11-18-23 @ 05:49) (139/68 - 179/85)  RR: 18 (11-18-23 @ 05:49) (16 - 22)  SpO2: 98% (11-18-23 @ 05:49) (97% - 98%) on RA    POCT Blood Glucose.: 107 mg/dL (17 Nov 2023 18:13)    PHYSICAL EXAM:  GENERAL: NAD  HEAD:  Normocephalic  EYES:  conjunctiva and sclera clear  ENMT: Moist mucous membranes  NECK: Supple, No JVD  NERVOUS SYSTEM:  Alert, awake, Good concentration, EOMI, no nystagmus, pupils equal, visual acuity grossly intact,  motor UE 5/5, LE left 4/5, right 5-/5  CHEST/LUNG: CTA b/l  HEART: Regular rate and rhythm; No murmurs  ABDOMEN: Soft, Nontender, Nondistended  EXTREMITIES: No edema  SKIN: warm, dry    Consultant(s) Notes Reviewed:  [x ] YES  [ ] NO  Care Discussed with Consultants/Other Providers [ x] YES  [ ] NO  Telemetry reviewed by me    LABS:                        13.5   5.16  )-----------( 221      ( 18 Nov 2023 07:03 )             40.7     11-18    141  |  103  |  13  ----------------------------<  88  3.9   |  26  |  0.7    Ca    9.2      18 Nov 2023 07:03  Mg     2.3     11-18  TPro  6.5  /  Alb  4.4  /  TBili  0.8  /  DBili  x   /  AST  12  /  ALT  8   /  AlkPhos  71  11-18  PT/INR - ( 17 Nov 2023 18:34 )   PT: 11.70 sec;   INR: 1.03 ratio  PTT - ( 17 Nov 2023 18:34 )  PTT:31.5 sec  CARDIAC MARKERS ( 17 Nov 2023 18:34 )  x     / <0.01 ng/mL / x     / x     / x          RADIOLOGY & ADDITIONAL TESTS:  Imaging or report Personally Reviewed:  [ x] YES  [ ] NO  Care discussed with pt    84 y/o woman with PMH of CAD S/P PCI X3 2007, 2009, 2015 (on ASA), HTN, GERD, Anmol's esophagus, ovarian cancer s/p DAVIS/BSO 44 y ago, s/p TAVR in 2023 and TKR 1/2022 presented for vertigo, imbalance, falls, occasional blurred vision and now with right ear pain and decreased hearing now.    1. Vertigo, imbalance, falls and now with right sided ear pain and decreased hearing  rule out central/peripheral causes  CT brain, CTA head and neck reports reviewed  trying to obtain MRI report done as outpt - left message at number above  neuro consult appreciated - MRI of brain ([place]) to rule out infarct if unable to obtain report from outside  ENT eval for right ear pain and decreased hearing  telemetry - no events  on ASA    2. CAD s/p PCI x 3 - on ASA  3. Espino's esophagus on PPI  4. DVT prophylaxis on lovenox    full code  guarded prognosis

## 2023-11-18 NOTE — CONSULT NOTE ADULT - SUBJECTIVE AND OBJECTIVE BOX
Stroke CODE CONSULT Note:    DANA LOPEZ    1. Chief Complaint: multiple complaints- dizziness, vision changes    HPI: : 83 year F, never smoker with PMH of CAD S/P PCI X3 , ,  (on ASA), HTN, GERD, Hysterectomy ?ovarian ca, TKR 2022  Patient presents for evaluation of dizziness, off balance, bilateral blurry vision for multiple days which worsened in the past 3 days. Patient reports "off balance" feeling for more than a week. Patient also reports b/l blurry vision x few weeks. Patient reports b/l LE weakness due to b/l knee replacements, left leg weaker than right. Patient recently went to her doctor for unsteady gait and had a MRI done on Wednesday Unable to locate images. Patient was told by her doctor to go to the ED given her acute on chronic symptoms. Patient also reports decreased hearing on the right.        2. Relevant PMH:   Prior ischemic stroke/TIA[ ], Afib [ ], CAD [ ], HTN [ ], DLD [ ], DM [ ], PVD [ ], Obesity [ ],   Sedentary lifestyle [ ], CHF [ ], SON [ ], Cancer Hx [ ].    3. Social History: Smoking [ ], Drug Use [ ], Alcohol Use [ ], Other [ ]    4. Possible Location of Stroke:    5. Relevant Brain Tissue Imaging:  < from: CT Brain Stroke Protocol (23 @ 18:25) >    Incidentally noted is a stable extra-axial, irregular calcification,   measuring 1 cm, on the left side at the level of the foramen magnum. This   is unchanged when compared previous studies dated back to 2018.    --- End of Report ---    *** END OF ADDENDUM # 1 ***      PROCEDURE DATE:  2023          INTERPRETATION:  CLINICAL INDICATION: Stroke code; unsteady gait,   dizziness, bilateral blurry vision, bilateral lower extremity weakness L>R    Technique: CT of the head was performed without contrast.    Multiple contiguous axial images were acquired from the skullbase to the   vertex without the administration of intravenous contrast.  Coronal and   sagittal reformations were made.    COMPARISON:  prior head CT dated 2019    FINDINGS:    The ventricles and sulci are appropriate for age.    Scattered hypodensities in the periventricular and subcortical white   matter associated with microvascular changes.    There is no evidence of large acute territorial infarct, intraparenchymal   hematoma, mass effect or midline shift. No abnormal extra-axial fluid   collections are present.    The calvarium is intact. Trace mucosal thickening of the ethmoid sinuses.   The visualized intraorbital compartments and mastoid complexes appear   free of acute disease.    IMPRESSION:    No acute intracranial pathology.    Trace mucosal thickening of the ethmoid sinuses.    These findings were discussed with MADHU Guy at 2023 6:35 PM by Dr. Saldivar with read back confirmation.    < end of copied text >    6. Relevant Cerebrovascular Imaging:    < from: CT Angio Neck Stroke Protocol w/ IV Cont (23 @ 18:53) >  ACC: [id number] EXAM:  CT ANGIO BRAIN STROKE PROTC IC   ORDERED BY:   ARGELIA BALDWIN     ACC: [id number] EXAM:  CT ANGIO NECK STROKE PROTCL IC   ORDERED BY:   ARGELIA BALDWIN     ACC: [id number] EXAM:  CT BRAIN PERFUSION MAPS STROKE   ORDERED BY:   ARGEILA BALDWIN     PROCEDURE DATE:  2023          INTERPRETATION:  Clinical History / Reason for exam: Stroke code.    TECHNIQUE: Following the intravenous administration of 50 cc of Omnipaque   350 IV contrast, serial axial images were obtained through the brain. The   CT perfusion data set was post processed per iSchemaViewRAPID protocol   generating color maps of CBF (cerebral blood flow), CBV (cerebral blood   volume), MTT (mean transit time), and Tmax. CT angiography of the   intracranial (head) and extracranial (neck) circulation was then   performed after initiation of an additional 70 cc of Omnipaque 350.   Multiple contiguous axial images from the skull base to vertex were   acquired. Maximal intensity projection images were additionally included   and reviewed.    80 cc of contrast material was discarded.    Using a separate workstation, 3-D shaded surface reformations were made   of vasculature. 3-D reformations were reviewed and included in   interpretation of the official report.    CAROTID STENOSIS REFERENCE: (NASCET = 100x1-(N/D)). N=greatest narrowing.   D=normal distal diameter - MILD = <50% stenosis. - MODERATE = 50-69%   stenosis. - SEVERE = 70-89% stenosis. - HAIRLINE/CRITICAL = 90-99%   stenosis. - OCCLUDED = 100% stenosis.    COMPARISON: No direct comparison is available.    FINDINGS:    PERFUSION:    CBF less than 30% volume: 0 cc  Tmax greater than 6 seconds: 0 cc  Mismatch volume: 0 cc  Mismatch ratio: None.    The CT perfusion study demonstrates no perfusion abnormality. There is no   mismatch defect.There is no mismatch volume.    HEAD CTA:    Internal carotid arteries demonstrate normal enhancement to the   intracranial circulation and [place]. Calcification without   stenosis is noted at the level of the carotid siphons.    ACAs and MCAs are patent bilaterally without evidence of vessel   occlusion, aneurysm, or vascular malformation.    Intradural vertebral arteries are patent to the vertebrobasilar   confluence. Posterior cerebral arteries demonstrate normal enhancement   and arborization without evidence of vascular occlusion, aneurysm, or   vascular malformation.    Visualized dural venous sinuses and deep cerebral venous structures   demonstrate normal enhancement without evidence of filling defect.    NECK CTA:  Normal aortic arch. Origins of the arch vessels are within normal limits.    Bilateral common carotid arteries are unremarkable. No evidence of   significant stenosis at the bilateral ICA or ECA origins. Bilateral   cervical ICAs are of normal course and caliber to the intracranial   circulation.    Origins of the right and left vertebral arteries are within normal   limits. Bilateral vertebral arteries are normal course and caliber to the   intracranial circulation.    OTHER: Bilateral thyroid nodules.    Incidentally noted is a stable extra-axial irregular calcification   measuring 1 cm on the left side at the level of the foramen magnum. This   is unchanged when compared previous studies dated back to 2018.    IMPRESSION:    PERFUSION:  No evidence of CBF less than 30% or Tmax greater than 6 seconds.    CTA HEAD:    No evidence of vessel occlusion or aneurysm.    CTA NECK:  No evidence of greater than 50% carotid or vertebral artery stenosis.    --- End of Report ---    < end of copied text >      7. Relevant blood tests:      8. Relevant cardiac rhythm monitorin. Relevant Cardiac Structure: (TTE/SATYA +/-):[ ]No intracardiac thrombus/[ ] no vegetation/[ ]no [place]/EF:    Home Medications:  amlodipine-benazepril 5 mg-20 mg oral capsule: 1 cap(s) orally once a day (04 May 2022 09:07)  aspirin 81 mg oral tablet, chewable: 1 tab(s) orally once a day (04 May 2022 09:07)  pravastatin 10 mg oral tablet: 1 tab(s) orally once a day (at bedtime) (04 May 2022 09:07)  Protonix 40 mg oral delayed release tablet: 1 tab(s) orally once a day (04 May 2022 09:07)      MEDICATIONS  (STANDING):      10. PT/OT/Speech/Rehab/S&Sw/ Cognitive eval results and recommendations:    11. Exam:    Vital Signs Last 24 Hrs  T(C): 37 (2023 18:07), Max: 37 (2023 18:07)  T(F): 98.6 (2023 18:07), Max: 98.6 (2023 18:07)  HR: 86 (2023 18:07) (86 - 86)  BP: 179/85 (2023 18:07) (179/85 - 179/85)  BP(mean): --  RR: 22 (2023 18:07) (22 - 22)  SpO2: 98% (2023 18:07) (98% - 98%)    Parameters below as of 2023 18:07  Patient On (Oxygen Delivery Method): room air    Neurologic Exam:  Mental status: Awake, alert and oriented to person, place, and time.  Language: Naming, repetition, fluency, and comprehension intact. No dysarthria, no aphasia  Cranial nerves:  ?Visual fields full, inconsistent exam able to see fingers all 4 quadrants and confrontation but sometimes unable to tell correct number . No nystagmus. Extraocular muscles intact,  Face symmetric. +Decreased hearing on right.  Motor:  Normal bulk and tone. Strength:    5/5 in RUE  5/5 in LUE but midly drifts   5/5 in RLE  4/5 in LLE but drifts   strength 5/5  Sensation: Decreased sensation on the left   Coordination: No dysmetria on finger-to-nose and heel-to-shin.                          
ENT CONSULT  Pt is a 82yo F admitted for stroke workup after patient presented  for dizziness, blurry vision; ENT consulted for R sided hearing loss. Pt seen at bedside. Reports for a few days she felt that maybe her hearing was slightly muffled however woke up today morning and noticed that she could not hear from the R side. States she feels like she is under water. Denies pain to ear, drainage, tinnitus.     PAST MEDICAL & SURGICAL HISTORY:  Hypertension    CAD (coronary artery disease)    GERD (gastroesophageal reflux disease)    Anxiety    H/O abdominal hysterectomy    H/O total knee replacement, right    S/P cholecystectomy      MEDICATIONS  (STANDING):  aspirin  chewable 81 milliGRAM(s) Oral daily  enoxaparin Injectable 40 milliGRAM(s) SubCutaneous every 24 hours  pantoprazole    Tablet 40 milliGRAM(s) Oral before breakfast    MEDICATIONS  (PRN):  acetaminophen     Tablet .. 650 milliGRAM(s) Oral every 6 hours PRN Mild Pain (1 - 3)      Allergies  No Known Allergies      SOCIAL HISTORY:    FAMILY HISTORY:    REVIEW OF SYSTEMS   [x] A ten-point review of systems was otherwise negative except as noted.    Vital Signs Last 24 Hrs  T(C): 36.2 (18 Nov 2023 13:00), Max: 37 (18 Nov 2023 00:10)  T(F): 97.2 (18 Nov 2023 13:00), Max: 98.6 (18 Nov 2023 00:10)  HR: 65 (18 Nov 2023 05:49) (65 - 75)  BP: 139/68 (18 Nov 2023 05:49) (139/68 - 150/80)  RR: 18 (18 Nov 2023 05:49) (16 - 18)  SpO2: 98% (18 Nov 2023 05:49) (97% - 98%)    Parameters below as of 18 Nov 2023 05:49  Patient On (Oxygen Delivery Method): room air      GEN: NAD. No drooling or pooling of secretions. No stridor.   NEURO: Awake and alert   HEENT: No pre/post auricular ttp b/l; b/l EAC non edematous or erythematous. b/l TM visualized appear intact.   RESP: Non-labored breathing.  ABDO: Soft, NT  EXT: LI x 4      LABS:                        13.5   5.16  )-----------( 221      ( 18 Nov 2023 07:03 )             40.7     11-18    141  |  103  |  13  ----------------------------<  88  3.9   |  26  |  0.7    Ca    9.2      18 Nov 2023 07:03  Mg     2.3     11-18    TPro  6.5  /  Alb  4.4  /  TBili  0.8  /  DBili  x   /  AST  12  /  ALT  8   /  AlkPhos  71  11-18    PT/INR - ( 17 Nov 2023 18:34 )   PT: 11.70 sec;   INR: 1.03 ratio         PTT - ( 17 Nov 2023 18:34 )  PTT:31.5 sec      RADIOLOGY & ADDITIONAL STUDIES:  < from: CT Brain Stroke Protocol (11.17.23 @ 18:25) >    ACC: [id number] EXAM:  CT BRAIN STROKE PROTOCOL   ORDERED BY: ARGELIA BALDWIN     *** ADDENDUM # 1 ***    Incidentally noted is a stable extra-axial, irregular calcification,   measuring 1 cm, on the left side at the level of the foramen magnum. This   is unchanged when compared previous studies dated back to 2018.    --- End of Report ---    *** END OF ADDENDUM # 1 ***      PROCEDURE DATE:  11/17/2023      INTERPRETATION:  CLINICAL INDICATION: Stroke code; unsteady gait,   dizziness, bilateral blurry vision, bilateral lower extremity weakness L>R    Technique: CT of the head was performed without contrast.    Multiple contiguous axial images were acquired from the skullbase to the   vertex without the administration of intravenous contrast.  Coronal and   sagittal reformations were made.    COMPARISON:  prior head CT dated 6/14/2019    FINDINGS:    The ventricles and sulci are appropriate for age.    Scattered hypodensities in the periventricular and subcortical white   matter associated with microvascular changes.    There is no evidence of large acute territorial infarct, intraparenchymal   hematoma, mass effect or midline shift. No abnormal extra-axial fluid   collections are present.    The calvarium is intact. Trace mucosal thickening of the ethmoid sinuses.   The visualized intraorbital compartments and mastoid complexes appear   free of acute disease.    IMPRESSION:    No acute intracranial pathology.    Trace mucosal thickening of the ethmoid sinuses.    These findings were discussed with MADHU Guy at 11/17/2023 6:35 PM by Dr. Saldivar with read back confirmation.    --- End of Report ---    ***Please see the addendum at the top of this report. It may contain   additional important information or changes.****      KO SALDIVAR MD; Resident Radiologist  This document has been electronically signed.  DEBBIE KUMAR MD; Attending Interventional Radiologist  This document has been electronically signed. Nov 17 2023  7:05PM  1st Addendum: DEBBIE KUMAR MD; Attending Interventional Radiologist  The first addendum was electronically signed on: Nov 17 2023  7:25PM.    < end of copied text >

## 2023-11-18 NOTE — H&P ADULT - NSHPPHYSICALEXAM_GEN_ALL_CORE
PHYSICAL EXAM:  GENERAL: NAD, lying in bed comfortably  HEAD:  Atraumatic, Normocephalic  EYES: EOMI, PERRLA, conjunctiva and sclera clear  ENT: Moist mucous membranes  NECK: Supple, No JVD  CHEST/LUNG: Clear to auscultation bilaterally; No rales, rhonchi, wheezing, or rubs. Unlabored respirations  HEART: Regular rate and rhythm; No murmurs, rubs, or gallops  ABDOMEN: Bowel sounds present; Soft, Nontender, Nondistended. No hepatomegaly  EXTREMITIES:  2+ Peripheral Pulses, brisk capillary refill. No clubbing, cyanosis, or edema  NERVOUS SYSTEM:  Alert & Oriented X3, speech clear. Non-focal. Neg. Pronator drift, Neg. Ankle clonus b/l positive. HINTS exam negative (no skew deviation, no nystagmus, no catch-up saccade).  MSK: FROM all 4 extremities. Slightly reduced left LE knee extension compared to rt (may be knee-limited).  SKIN: No rashes or lesions

## 2023-11-18 NOTE — PATIENT PROFILE ADULT - HISTORY OF COVID-19 VACCINATION
You can access the FollowMyHealth Patient Portal offered by [facility] by registering at the following website: [email or web address]. By joining [facility]’s FollowMyHealth portal, you will also be able to view your health information using other applications (apps) compatible with our system. Vaccine status unknown

## 2023-11-19 ENCOUNTER — TRANSCRIPTION ENCOUNTER (OUTPATIENT)
Age: 83
End: 2023-11-19

## 2023-11-19 VITALS
RESPIRATION RATE: 18 BRPM | DIASTOLIC BLOOD PRESSURE: 84 MMHG | SYSTOLIC BLOOD PRESSURE: 159 MMHG | OXYGEN SATURATION: 98 % | TEMPERATURE: 97 F | HEART RATE: 97 BPM

## 2023-11-19 PROCEDURE — 99239 HOSP IP/OBS DSCHRG MGMT >30: CPT

## 2023-11-19 PROCEDURE — 70551 MRI BRAIN STEM W/O DYE: CPT | Mod: 26

## 2023-11-19 RX ORDER — ONDANSETRON 8 MG/1
4 TABLET, FILM COATED ORAL EVERY 6 HOURS
Refills: 0 | Status: DISCONTINUED | OUTPATIENT
Start: 2023-11-19 | End: 2023-11-19

## 2023-11-19 RX ORDER — ONDANSETRON 8 MG/1
8 TABLET, FILM COATED ORAL EVERY 8 HOURS
Refills: 0 | Status: DISCONTINUED | OUTPATIENT
Start: 2023-11-19 | End: 2023-11-19

## 2023-11-19 RX ADMIN — PANTOPRAZOLE SODIUM 40 MILLIGRAM(S): 20 TABLET, DELAYED RELEASE ORAL at 05:42

## 2023-11-19 RX ADMIN — ONDANSETRON 4 MILLIGRAM(S): 8 TABLET, FILM COATED ORAL at 00:11

## 2023-11-19 RX ADMIN — Medication 81 MILLIGRAM(S): at 12:48

## 2023-11-19 RX ADMIN — ONDANSETRON 4 MILLIGRAM(S): 8 TABLET, FILM COATED ORAL at 12:48

## 2023-11-19 RX ADMIN — Medication 60 MILLIGRAM(S): at 05:42

## 2023-11-19 RX ADMIN — Medication 650 MILLIGRAM(S): at 11:22

## 2023-11-19 RX ADMIN — Medication 650 MILLIGRAM(S): at 10:11

## 2023-11-19 NOTE — PROGRESS NOTE ADULT - SUBJECTIVE AND OBJECTIVE BOX
24H events:    Patient is a 83y old Female who presents with a chief complaint of dizziness, blurry vision, weakness (Left weaker than Rt) (18 Nov 2023 19:06)    Primary diagnosis of Dizziness    Today is hospital day 2d. This morning patient was seen and examined at bedside, resting comfortably in bed.    No acute or major events overnight.    Code Status: Full code    PAST MEDICAL & SURGICAL HISTORY  Hypertension    CAD (coronary artery disease)    GERD (gastroesophageal reflux disease)    Anxiety    H/O abdominal hysterectomy    H/O total knee replacement, right    S/P cholecystectomy      SOCIAL HISTORY:  Social History:      ALLERGIES:  No Known Allergies    MEDICATIONS:  STANDING MEDICATIONS  aspirin  chewable 81 milliGRAM(s) Oral daily  enoxaparin Injectable 40 milliGRAM(s) SubCutaneous every 24 hours  pantoprazole    Tablet 40 milliGRAM(s) Oral before breakfast  predniSONE   Tablet 60 milliGRAM(s) Oral daily    PRN MEDICATIONS  acetaminophen     Tablet .. 650 milliGRAM(s) Oral every 6 hours PRN    VITALS:   T(F): 98.2  HR: 74  BP: 152/74  RR: 18  SpO2: 98%    PHYSICAL EXAM:  GENERAL: NAD, lying in bed comfortably  HEAD:  Atraumatic, Normocephalic  EYES: EOMI, PERRLA, conjunctiva and sclera clear  CHEST/LUNG: Clear to auscultation bilaterally; No rales, rhonchi, wheezing, or rubs. Unlabored respirations  HEART: Regular rate and rhythm; No murmurs, rubs, or gallops  ABDOMEN: Bowel sounds present; Soft, Nontender, Nondistended. No hepatomegaly  EXTREMITIES:  2+ Peripheral Pulses, brisk capillary refill. No clubbing, cyanosis, or edema    LABS:                        13.5   5.16  )-----------( 221      ( 18 Nov 2023 07:03 )             40.7     11-18    141  |  103  |  13  ----------------------------<  88  3.9   |  26  |  0.7    Ca    9.2      18 Nov 2023 07:03  Mg     2.3     11-18    TPro  6.5  /  Alb  4.4  /  TBili  0.8  /  DBili  x   /  AST  12  /  ALT  8   /  AlkPhos  71  11-18    PT/INR - ( 17 Nov 2023 18:34 )   PT: 11.70 sec;   INR: 1.03 ratio         PTT - ( 17 Nov 2023 18:34 )  PTT:31.5 sec  Urinalysis Basic - ( 18 Nov 2023 07:03 )    Color: x / Appearance: x / SG: x / pH: x  Gluc: 88 mg/dL / Ketone: x  / Bili: x / Urobili: x   Blood: x / Protein: x / Nitrite: x   Leuk Esterase: x / RBC: x / WBC x   Sq Epi: x / Non Sq Epi: x / Bacteria: x            CARDIAC MARKERS ( 17 Nov 2023 18:34 )  x     / <0.01 ng/mL / x     / x     / x

## 2023-11-19 NOTE — DISCHARGE NOTE NURSING/CASE MANAGEMENT/SOCIAL WORK - NSDCPEFALRISK_GEN_ALL_CORE
For information on Fall & Injury Prevention, visit: [email or web address] OR  [email or web address] OR  https://www.cdc.gov/steadi/patient.html

## 2023-11-19 NOTE — DISCHARGE NOTE NURSING/CASE MANAGEMENT/SOCIAL WORK - PATIENT PORTAL LINK FT
You can access the FollowMyHealth Patient Portal offered by [facility] by registering at the following website: [email or web address]. By joining [employer]’s FollowMyHealth portal, you will also be able to view your health information using other applications (apps) compatible with our system.

## 2023-11-19 NOTE — PROGRESS NOTE ADULT - SUBJECTIVE AND OBJECTIVE BOX
ENT PROGRESS NOTE    Pt is a 82yo F admitted for stroke workup after patient presented  for dizziness, blurry vision; ENT consulted for R sided hearing loss. Pt seen and examined at bedside with Dr. Casper. Pt states she still feels like she cannot hear out of R ear. Denies pain to ear, drainage, tinnitus.     REVIEW OF SYSTEMS   [x] A ten-point review of systems was otherwise negative except as noted.    Allergies  No Known Allergies    MEDICATIONS:  acetaminophen     Tablet .. 650 milliGRAM(s) Oral every 6 hours PRN  aspirin  chewable 81 milliGRAM(s) Oral daily  enoxaparin Injectable 40 milliGRAM(s) SubCutaneous every 24 hours  ondansetron Injectable 4 milliGRAM(s) IV Push every 6 hours PRN  pantoprazole    Tablet 40 milliGRAM(s) Oral before breakfast  predniSONE   Tablet 60 milliGRAM(s) Oral daily      Vital Signs Last 24 Hrs  T(C): 36.7 (19 Nov 2023 05:38), Max: 36.8 (18 Nov 2023 20:39)  T(F): 98 (19 Nov 2023 05:38), Max: 98.2 (18 Nov 2023 20:39)  HR: 71 (19 Nov 2023 05:38) (71 - 74)  BP: 136/68 (19 Nov 2023 05:38) (136/68 - 152/74)  BP(mean): 106 (18 Nov 2023 20:39) (106 - 106)  RR: 18 (19 Nov 2023 05:38) (18 - 18)  SpO2: --          11-18 @ 07:01 - 11-19 @ 07:00  --------------------------------------------------------  IN:    Oral Fluid: 774 mL  Total IN: 774 mL    OUT:  Total OUT: 0 mL    Total NET: 774 mL      11-19 @ 07:01 - 11-19 @ 12:27  --------------------------------------------------------  IN:    Oral Fluid: 210 mL  Total IN: 210 mL    OUT:  Total OUT: 0 mL    Total NET: 210 mL      PHYSICAL EXAM:  GEN: NAD. No drooling or pooling of secretions. No stridor.   NEURO: Awake and alert   HEENT: No pre/post auricular ttp b/l; b/l EAC non edematous or erythematous. b/l TM visualized appear intact. Dias test performed-- lateralizes to L ear   RESP: Non-labored breathing.  ABDO: Soft, NT  EXT: LI x 4        LABS:  CBC-                        13.5   5.16  )-----------( 221      ( 18 Nov 2023 07:03 )             40.7     BMP/CMP-  18 Nov 2023 07:03    141    |  103    |  13     ----------------------------<  88     3.9     |  26     |  0.7      Ca    9.2        18 Nov 2023 07:03  Mg     2.3       18 Nov 2023 07:03    TPro  6.5    /  Alb  4.4    /  TBili  0.8    /  DBili  x      /  AST  12     /  ALT  8      /  AlkPhos  71     18 Nov 2023 07:03

## 2023-11-19 NOTE — DISCHARGE NOTE PROVIDER - NSDCMRMEDTOKEN_GEN_ALL_CORE_FT
aspirin 81 mg oral tablet, chewable: 1 tab(s) orally once a day  predniSONE 20 mg oral tablet: 3 tab(s) orally once a day  Protonix 40 mg oral delayed release tablet: 1 tab(s) orally once a day

## 2023-11-19 NOTE — DISCHARGE NOTE PROVIDER - CARE PROVIDER_API CALL
Keith Casper  Otolaryngology  [address] [place] [address]  Phone: [phone]  Fax: [phone]  Follow Up Time: 1 week   Keith Casper  Otolaryngology  [address] [place] [address]  Phone: [phone]  Fax: [phone]  Follow Up Time: 1 week    Wu Robles  Neurology  [address] [place] [address]  Phone: [phone]  Fax: [phone]  Established Patient  Follow Up Time: 1 week

## 2023-11-19 NOTE — DISCHARGE NOTE PROVIDER - PROVIDER TOKENS
PROVIDER:[TOKEN:[[id number]],FOLLOWUP:[1 week]] PROVIDER:[TOKEN:[[id number]],FOLLOWUP:[1 week]],PROVIDER:[TOKEN:[[id number]],FOLLOWUP:[1 week],ESTABLISHEDPATIENT:[T]]

## 2023-11-19 NOTE — DISCHARGE NOTE PROVIDER - NSDCCPCAREPLAN_GEN_ALL_CORE_FT
PRINCIPAL DISCHARGE DIAGNOSIS  Diagnosis: Dizziness  Assessment and Plan of Treatment: You presented with vertigo, weakness and right ear pain with hearing loss. You were evaluated by neurology and Ear, nose and throat specialist.  MRI of brain was performed. Take prednisone 60mg daily for 1 week and follow up with the Ear, nose and throat specialist for further evaluation and treatment. You need a hearing test as an outpatient.   Sit down if you feel dizzy to prevent falls.  Seek medical attention for worsening symptoms or other concerns.     PRINCIPAL DISCHARGE DIAGNOSIS  Diagnosis: Dizziness  Assessment and Plan of Treatment: You presented with vertigo, weakness and right ear pain with hearing loss. You were evaluated by neurology and Ear, nose and throat specialist.  MRI of brain was performed and preliminary report showed no infarct.   Take prednisone 60mg daily for 1 week and follow up with the Ear, nose and throat specialist for further evaluation and treatment. You need a hearing test as an outpatient. Follow up with neurology as outpt  Sit down if you feel dizzy to prevent falls.  Seek medical attention for worsening symptoms or other concerns.

## 2023-11-19 NOTE — PROGRESS NOTE ADULT - REASON FOR ADMISSION
dizziness, blurry vision, weakness (Left weaker than Rt)

## 2023-11-19 NOTE — DISCHARGE NOTE PROVIDER - HOSPITAL COURSE
82 y/o woman with PMH of CAD S/P PCI X3 2007, 2009, 2015 (on ASA), HTN, GERD, Anmol's esophagus, ovarian cancer s/p DAVIS/BSO 44 y ago, s/p TAVR in 2022 and b/l TKRs presented for vertigo, imbalance, falls, occasional blurred vision and now with right ear pain and decreased hearing now.    1. Vertigo, imbalance, falls and now with right sided ear pain and decreased hearing  rule out central/peripheral causes  CT brain, CTA head and neck reports reviewed  trying to obtain MRI report done as outpt - left message at number above  neuro consult appreciated - MRI of brain ([place]) to rule out infarct ordered  ENT eval for right ear pain and decreased hearing -> can not rule out SNHL -> prednisone started and she will need outpt f/u and audiogram  telemetry - no events  on ASA  PT consulted    2. CAD s/p PCI x 3 - on ASA  start statin if pt is agreeable (LDL 94) - pt states statin was stopped last year by her cardiologist    3. Espino's esophagus on PPI   82 y/o woman with PMH of CAD S/P PCI X3 2007, 2009, 2015 (on ASA), HTN, GERD, Anmol's esophagus, ovarian cancer s/p DAVIS/BSO 44 y ago, s/p TAVR in 2022 and b/l TKRs presented for vertigo, imbalance, falls, occasional blurred vision and now with right ear pain and decreased hearing now.    1. Vertigo, imbalance, falls and now with right sided ear pain and decreased hearing  rule out central/peripheral causes  CT brain, CTA head and neck reports reviewed  trying to obtain MRI report done as outpt - left message at number above  neuro consult appreciated - MRI of brain (NC) to rule out infarct ordered  ENT eval for right ear pain and decreased hearing -> can not rule out SNHL -> prednisone started and she will need outpt f/u and audiogram  telemetry - no events  on ASA  PT consulted    2. CAD s/p PCI x 3 - on ASA  start statin if pt is agreeable (LDL 94) - pt states statin was stopped last year by her cardiologist    3. Espino's esophagus on PPI    Addendum: MRI of brain preliminary report: no infarct   pt was discharged by on call resident

## 2023-11-19 NOTE — PROGRESS NOTE ADULT - ASSESSMENT
The pt is an 83 year F, never smoker with PMH of CAD S/P PCI X3 2007, 2009, 2015 (on ASA), HTN, GERD, Anmol's esophagus, Hysterectomy ?ovarian ca, TKR 1/2022 presents for evaluation of dizziness, impaired balance/vertigo, bilateral blurry vision since October 10th.     #Vertigo  #Central (possible stroke) vs peripheral   #Anxiety   -pt describes rt. sided new onset hearing loss w/ aural fullness  -HINTS exam negative.   -imaging negative   -pt was taking lexapro around Oct 10th and stopped taking med after her sx began  -Obtain MRI B records  -MRI B non contrast, can consider MR c spine if MRI brain is inconclusive   -b12, folate, tsh ,a1c, lipid panel- wnl  -c/w  home ASA 81 mg daily    -orthostats negative  -TTE pending  -pt/ot  -ENT rec high dose steroids: start 60mg prednisone       #Ovarian cancer  -s/p salpingoopherectomy and total hysterectomy  -f/u OP    #Anmol's esophagus  -protonix  -f/u OP w/ GI    #CAD  #s/p PCI x3  -most recent was 2015  -c/w ASA  -pt older than 75, most likely doesn't need statin for secondary prevention    #HTN  -previously was on amlodipine       #Diet: DASH  #Activity: IAT  #GI ppx: protonix  #DVT ppx: lovenox  #Dispo:tele  #Pending: Retrieval of MRI records, Med rec in AM.    
84 y/o woman with PMH of CAD S/P PCI X3 2007, 2009, 2015 (on ASA), HTN, GERD, Anmol's esophagus, ovarian cancer s/p DAVIS/BSO 44 y ago, s/p TAVR in 2022 and b/l TKRs presented for vertigo, imbalance, falls, occasional blurred vision and now with right ear pain and decreased hearing now.    1. Vertigo, imbalance, falls and now with right sided ear pain and decreased hearing  rule out central/peripheral causes  CT brain, CTA head and neck reports reviewed  trying to obtain MRI report done as outpt - left message at number above  neuro consult appreciated - MRI of brain (NC) to rule out infarct ordered  ENT eval for right ear pain and decreased hearing -> can not rule out SNHL -> prednisone started and she will need outpt f/u and audiogram  telemetry - no events  on ASA  PT consulted    2. CAD s/p PCI x 3 - on ASA  start statin if pt is agreeable (LDL 94)    3. Espino's esophagus on PPI    4. DVT prophylaxis on lovenox    full code      PROGRESS NOTE HANDOFF    Pending: MRI brain, ENT attending eval, PT consult  pt informed of the plan of care and all questions answered  Disposition: home  
Pt is a 84yo F admitted for stroke workup after patient presented  for dizziness, blurry vision; ENT consulted for R sided hearing loss.    ·	Pt seen and examined at bedside with Dr. Casper   ·	Dias test performed-- Lateralizes to L ear, c/w SNHL   ·	Cont with Prednisone 60mg x 1week until follow up with Dr. Casper    ·	Pt will need Audiogram (inpt v outpt)  ·	Pt will need to followup outpatient with Dr. Casper next week for further testing/ management  ·	Please alert ENT when patient is being discharged; will schedule f/u appt

## 2023-11-19 NOTE — PROGRESS NOTE ADULT - SUBJECTIVE AND OBJECTIVE BOX
RIDANA BROWN  83y Female    INTERVAL HPI/OVERNIGHT EVENTS:    c/o headache and nausea today  awaiting MRI  no fever, vomiting  still with right sided decreased hearing - now on prednisone    T(F): 98 (11-19-23 @ 05:38), Max: 98.2 (11-18-23 @ 20:39)  HR: 71 (11-19-23 @ 05:38) (71 - 74)  BP: 136/68 (11-19-23 @ 05:38) (136/68 - 152/74)  RR: 18 (11-19-23 @ 05:38) (18 - 18)  SpO2: --    I&O's Summary    18 Nov 2023 07:01  -  19 Nov 2023 07:00  --------------------------------------------------------  IN: 774 mL / OUT: 0 mL / NET: 774 mL    19 Nov 2023 07:01  -  19 Nov 2023 11:46  --------------------------------------------------------  IN: 210 mL / OUT: 0 mL / NET: 210 mL      PHYSICAL EXAM:  GENERAL: NAD  HEAD:  Normocephalic  EYES:  conjunctiva and sclera clear  ENMT: Moist mucous membranes  NECK: Supple, No JVD  NERVOUS SYSTEM:  Alert, awake, Good concentration, LE 4/5 b/l, no nystagmus  CHEST/LUNG: CTA b/l  HEART: Regular rate and rhythm  ABDOMEN: Soft, Nontender, Nondistended  EXTREMITIES: No edema  SKIN: warm, dry    Consultant(s) Notes Reviewed:  [x ] YES  [ ] NO  Care Discussed with Consultants/Other Providers [ x] YES  [ ] NO    MEDICATIONS  (STANDING):  aspirin  chewable 81 milliGRAM(s) Oral daily  enoxaparin Injectable 40 milliGRAM(s) SubCutaneous every 24 hours  pantoprazole    Tablet 40 milliGRAM(s) Oral before breakfast  predniSONE   Tablet 60 milliGRAM(s) Oral daily    MEDICATIONS  (PRN):  acetaminophen     Tablet .. 650 milliGRAM(s) Oral every 6 hours PRN Mild Pain (1 - 3)  ondansetron    Tablet 8 milliGRAM(s) Oral every 8 hours PRN Nausea and/or Vomiting      Telemetry reviewed by me    LABS:                        13.5   5.16  )-----------( 221      ( 18 Nov 2023 07:03 )             40.7     11-18    141  |  103  |  13  ----------------------------<  88  3.9   |  26  |  0.7    Ca    9.2      18 Nov 2023 07:03  Mg     2.3     11-18    TPro  6.5  /  Alb  4.4  /  TBili  0.8  /  DBili  x   /  AST  12  /  ALT  8   /  AlkPhos  71  11-18    PT/INR - ( 17 Nov 2023 18:34 )   PT: 11.70 sec;   INR: 1.03 ratio         PTT - ( 17 Nov 2023 18:34 )  PTT:31.5 sec  CARDIAC MARKERS ( 17 Nov 2023 18:34 )  x     / <0.01 ng/mL / x     / x     / x              RADIOLOGY & ADDITIONAL TESTS:    Imaging or report Personally Reviewed:  [ x] YES  [ ] NO        Case discussed with RN today    Care discussed with pt

## 2023-11-20 NOTE — CHART NOTE - NSCHARTNOTEFT_GEN_A_CORE
Spoke to MRI technician about ordering MRI head for patient. She has a history of PCI x3, TKR, and TAVR. TAVR was done in 5/2022: TAVR, percutaneous femoral approach, using prosthetic valve 04-May-2022 13:21:44 Utilizing 26mm Azevedo transcatheter pericardial tissue heart valve, serial # [id number], Model # [id number] Prabhjot Martinez    MRI technician advised to include this TAVR information when ordering MRI
Followed up on the official MRI report  Pt was discharged home yesterday.      < from: MR Head No Cont (11.19.23 @ 14:44) >    IMPRESSION:    1. No acute infarct or intracranial hemorrhage.    3. Moderate chronic microvascular changes.      The following is added at time of final dictation:    3. Left cerebellomedullary cistern calcified lesion measuring about 1 cm,   similar on CT dating back to 2018 and likely reflecting a meningioma.   Since this is adjacent to the left vertebral artery, aneurysm should be   excluded (difficult to assess on CTA due to the calcification). Recommend   further evaluation with contrast-enhanced MRI and MRA.    < end of copied text >    I called and discussed the above report with the pt today.  She states that she had a MRI with contrast which was ordered by Dr. Mk Robles  I called [facility] re: the pt and the report and I'm awaiting callback.   Pt can have outpt contrast-enhanced MRI and MRA if needed for further evaluation.

## 2023-11-21 ENCOUNTER — NON-APPOINTMENT (OUTPATIENT)
Age: 83
End: 2023-11-21

## 2023-11-25 DIAGNOSIS — Z90.710 ACQUIRED ABSENCE OF BOTH CERVIX AND UTERUS: ICD-10-CM

## 2023-11-25 DIAGNOSIS — Z85.43 PERSONAL HISTORY OF MALIGNANT NEOPLASM OF OVARY: ICD-10-CM

## 2023-11-25 DIAGNOSIS — Z98.61 CORONARY ANGIOPLASTY STATUS: ICD-10-CM

## 2023-11-25 DIAGNOSIS — Z96.653 PRESENCE OF ARTIFICIAL KNEE JOINT, BILATERAL: ICD-10-CM

## 2023-11-25 DIAGNOSIS — R42 DIZZINESS AND GIDDINESS: ICD-10-CM

## 2023-11-25 DIAGNOSIS — Z79.82 LONG TERM (CURRENT) USE OF ASPIRIN: ICD-10-CM

## 2023-11-25 DIAGNOSIS — Z90.722 ACQUIRED ABSENCE OF OVARIES, BILATERAL: ICD-10-CM

## 2023-11-25 DIAGNOSIS — I25.10 ATHEROSCLEROTIC HEART DISEASE OF NATIVE CORONARY ARTERY WITHOUT ANGINA PECTORIS: ICD-10-CM

## 2023-11-25 DIAGNOSIS — I10 ESSENTIAL (PRIMARY) HYPERTENSION: ICD-10-CM

## 2023-11-25 DIAGNOSIS — Z79.02 LONG TERM (CURRENT) USE OF ANTITHROMBOTICS/ANTIPLATELETS: ICD-10-CM

## 2023-11-25 DIAGNOSIS — K22.70 BARRETT'S ESOPHAGUS WITHOUT DYSPLASIA: ICD-10-CM

## 2023-11-25 DIAGNOSIS — K21.9 GASTRO-ESOPHAGEAL REFLUX DISEASE WITHOUT ESOPHAGITIS: ICD-10-CM

## 2023-11-25 DIAGNOSIS — F41.9 ANXIETY DISORDER, UNSPECIFIED: ICD-10-CM

## 2023-11-25 DIAGNOSIS — Z90.49 ACQUIRED ABSENCE OF OTHER SPECIFIED PARTS OF DIGESTIVE TRACT: ICD-10-CM

## 2023-11-25 DIAGNOSIS — H90.5 UNSPECIFIED SENSORINEURAL HEARING LOSS: ICD-10-CM

## 2023-11-25 DIAGNOSIS — H92.01 OTALGIA, RIGHT EAR: ICD-10-CM

## 2023-11-25 DIAGNOSIS — Z95.3 PRESENCE OF XENOGENIC HEART VALVE: ICD-10-CM

## 2023-12-07 ENCOUNTER — APPOINTMENT (OUTPATIENT)
Dept: OTOLARYNGOLOGY | Facility: CLINIC | Age: 83
End: 2023-12-07
Payer: MEDICARE

## 2023-12-07 VITALS — BODY MASS INDEX: 26.46 KG/M2 | HEIGHT: 64 IN | WEIGHT: 155 LBS

## 2023-12-07 PROCEDURE — 92557 COMPREHENSIVE HEARING TEST: CPT

## 2023-12-07 PROCEDURE — 92550 TYMPANOMETRY & REFLEX THRESH: CPT | Mod: 52

## 2023-12-07 PROCEDURE — 99204 OFFICE O/P NEW MOD 45 MIN: CPT

## 2023-12-13 ENCOUNTER — APPOINTMENT (OUTPATIENT)
Dept: OTOLARYNGOLOGY | Facility: CLINIC | Age: 83
End: 2023-12-13

## 2023-12-22 ENCOUNTER — APPOINTMENT (OUTPATIENT)
Dept: CARDIOLOGY | Facility: CLINIC | Age: 83
End: 2023-12-22

## 2024-01-07 ENCOUNTER — INPATIENT (INPATIENT)
Facility: HOSPITAL | Age: 84
LOS: 1 days | Discharge: HOME | DRG: 64 | End: 2024-01-09
Attending: PSYCHIATRY & NEUROLOGY | Admitting: STUDENT IN AN ORGANIZED HEALTH CARE EDUCATION/TRAINING PROGRAM
Payer: MEDICARE

## 2024-01-07 VITALS
OXYGEN SATURATION: 95 % | SYSTOLIC BLOOD PRESSURE: 140 MMHG | RESPIRATION RATE: 76 BRPM | TEMPERATURE: 98 F | DIASTOLIC BLOOD PRESSURE: 69 MMHG

## 2024-01-07 DIAGNOSIS — Z90.710 ACQUIRED ABSENCE OF BOTH CERVIX AND UTERUS: Chronic | ICD-10-CM

## 2024-01-07 DIAGNOSIS — Z96.651 PRESENCE OF RIGHT ARTIFICIAL KNEE JOINT: Chronic | ICD-10-CM

## 2024-01-07 DIAGNOSIS — Z90.49 ACQUIRED ABSENCE OF OTHER SPECIFIED PARTS OF DIGESTIVE TRACT: Chronic | ICD-10-CM

## 2024-01-07 DIAGNOSIS — I63.9 CEREBRAL INFARCTION, UNSPECIFIED: ICD-10-CM

## 2024-01-07 LAB
ALBUMIN SERPL ELPH-MCNC: 4.5 G/DL — SIGNIFICANT CHANGE UP (ref 3.5–5.2)
ALBUMIN SERPL ELPH-MCNC: 4.5 G/DL — SIGNIFICANT CHANGE UP (ref 3.5–5.2)
ALBUMIN SERPL ELPH-MCNC: 4.6 G/DL — SIGNIFICANT CHANGE UP (ref 3.5–5.2)
ALBUMIN SERPL ELPH-MCNC: 4.6 G/DL — SIGNIFICANT CHANGE UP (ref 3.5–5.2)
ALP SERPL-CCNC: 85 U/L — SIGNIFICANT CHANGE UP (ref 30–115)
ALP SERPL-CCNC: 85 U/L — SIGNIFICANT CHANGE UP (ref 30–115)
ALP SERPL-CCNC: 86 U/L — SIGNIFICANT CHANGE UP (ref 30–115)
ALP SERPL-CCNC: 86 U/L — SIGNIFICANT CHANGE UP (ref 30–115)
ALT FLD-CCNC: 10 U/L — SIGNIFICANT CHANGE UP (ref 0–41)
ALT FLD-CCNC: 10 U/L — SIGNIFICANT CHANGE UP (ref 0–41)
ALT FLD-CCNC: 9 U/L — SIGNIFICANT CHANGE UP (ref 0–41)
ALT FLD-CCNC: 9 U/L — SIGNIFICANT CHANGE UP (ref 0–41)
ANION GAP SERPL CALC-SCNC: 11 MMOL/L — SIGNIFICANT CHANGE UP (ref 7–14)
ANION GAP SERPL CALC-SCNC: 11 MMOL/L — SIGNIFICANT CHANGE UP (ref 7–14)
ANION GAP SERPL CALC-SCNC: 16 MMOL/L — HIGH (ref 7–14)
ANION GAP SERPL CALC-SCNC: 16 MMOL/L — HIGH (ref 7–14)
APTT BLD: 31.3 SEC — SIGNIFICANT CHANGE UP (ref 27–39.2)
APTT BLD: 31.3 SEC — SIGNIFICANT CHANGE UP (ref 27–39.2)
AST SERPL-CCNC: 13 U/L — SIGNIFICANT CHANGE UP (ref 0–41)
AST SERPL-CCNC: 13 U/L — SIGNIFICANT CHANGE UP (ref 0–41)
AST SERPL-CCNC: 17 U/L — SIGNIFICANT CHANGE UP (ref 0–41)
AST SERPL-CCNC: 17 U/L — SIGNIFICANT CHANGE UP (ref 0–41)
BASOPHILS # BLD AUTO: 0.04 K/UL — SIGNIFICANT CHANGE UP (ref 0–0.2)
BASOPHILS # BLD AUTO: 0.04 K/UL — SIGNIFICANT CHANGE UP (ref 0–0.2)
BASOPHILS NFR BLD AUTO: 0.8 % — SIGNIFICANT CHANGE UP (ref 0–1)
BASOPHILS NFR BLD AUTO: 0.8 % — SIGNIFICANT CHANGE UP (ref 0–1)
BILIRUB DIRECT SERPL-MCNC: 0.2 MG/DL — SIGNIFICANT CHANGE UP (ref 0–0.3)
BILIRUB DIRECT SERPL-MCNC: 0.2 MG/DL — SIGNIFICANT CHANGE UP (ref 0–0.3)
BILIRUB INDIRECT FLD-MCNC: 0.7 MG/DL — SIGNIFICANT CHANGE UP (ref 0.2–1.2)
BILIRUB INDIRECT FLD-MCNC: 0.7 MG/DL — SIGNIFICANT CHANGE UP (ref 0.2–1.2)
BILIRUB SERPL-MCNC: 0.3 MG/DL — SIGNIFICANT CHANGE UP (ref 0.2–1.2)
BILIRUB SERPL-MCNC: 0.3 MG/DL — SIGNIFICANT CHANGE UP (ref 0.2–1.2)
BILIRUB SERPL-MCNC: 0.9 MG/DL — SIGNIFICANT CHANGE UP (ref 0.2–1.2)
BILIRUB SERPL-MCNC: 0.9 MG/DL — SIGNIFICANT CHANGE UP (ref 0.2–1.2)
BUN SERPL-MCNC: 15 MG/DL — SIGNIFICANT CHANGE UP (ref 10–20)
BUN SERPL-MCNC: 15 MG/DL — SIGNIFICANT CHANGE UP (ref 10–20)
BUN SERPL-MCNC: 16 MG/DL — SIGNIFICANT CHANGE UP (ref 10–20)
BUN SERPL-MCNC: 16 MG/DL — SIGNIFICANT CHANGE UP (ref 10–20)
CALCIUM SERPL-MCNC: 9.6 MG/DL — SIGNIFICANT CHANGE UP (ref 8.4–10.5)
CHLORIDE SERPL-SCNC: 102 MMOL/L — SIGNIFICANT CHANGE UP (ref 98–110)
CHLORIDE SERPL-SCNC: 102 MMOL/L — SIGNIFICANT CHANGE UP (ref 98–110)
CHLORIDE SERPL-SCNC: 105 MMOL/L — SIGNIFICANT CHANGE UP (ref 98–110)
CHLORIDE SERPL-SCNC: 105 MMOL/L — SIGNIFICANT CHANGE UP (ref 98–110)
CO2 SERPL-SCNC: 21 MMOL/L — SIGNIFICANT CHANGE UP (ref 17–32)
CO2 SERPL-SCNC: 21 MMOL/L — SIGNIFICANT CHANGE UP (ref 17–32)
CO2 SERPL-SCNC: 29 MMOL/L — SIGNIFICANT CHANGE UP (ref 17–32)
CO2 SERPL-SCNC: 29 MMOL/L — SIGNIFICANT CHANGE UP (ref 17–32)
CREAT SERPL-MCNC: 0.6 MG/DL — LOW (ref 0.7–1.5)
EGFR: 90 ML/MIN/1.73M2 — SIGNIFICANT CHANGE UP
EOSINOPHIL # BLD AUTO: 0.21 K/UL — SIGNIFICANT CHANGE UP (ref 0–0.7)
EOSINOPHIL # BLD AUTO: 0.21 K/UL — SIGNIFICANT CHANGE UP (ref 0–0.7)
EOSINOPHIL NFR BLD AUTO: 4.2 % — SIGNIFICANT CHANGE UP (ref 0–8)
EOSINOPHIL NFR BLD AUTO: 4.2 % — SIGNIFICANT CHANGE UP (ref 0–8)
ETHANOL SERPL-MCNC: 135 MG/DL — HIGH
ETHANOL SERPL-MCNC: 135 MG/DL — HIGH
FLUAV AG NPH QL: SIGNIFICANT CHANGE UP
FLUAV AG NPH QL: SIGNIFICANT CHANGE UP
FLUBV AG NPH QL: SIGNIFICANT CHANGE UP
FLUBV AG NPH QL: SIGNIFICANT CHANGE UP
GLUCOSE SERPL-MCNC: 82 MG/DL — SIGNIFICANT CHANGE UP (ref 70–99)
GLUCOSE SERPL-MCNC: 82 MG/DL — SIGNIFICANT CHANGE UP (ref 70–99)
GLUCOSE SERPL-MCNC: 87 MG/DL — SIGNIFICANT CHANGE UP (ref 70–99)
GLUCOSE SERPL-MCNC: 87 MG/DL — SIGNIFICANT CHANGE UP (ref 70–99)
HCT VFR BLD CALC: 42.5 % — SIGNIFICANT CHANGE UP (ref 37–47)
HCT VFR BLD CALC: 42.5 % — SIGNIFICANT CHANGE UP (ref 37–47)
HGB BLD-MCNC: 14.2 G/DL — SIGNIFICANT CHANGE UP (ref 12–16)
HGB BLD-MCNC: 14.2 G/DL — SIGNIFICANT CHANGE UP (ref 12–16)
IMM GRANULOCYTES NFR BLD AUTO: 0.2 % — SIGNIFICANT CHANGE UP (ref 0.1–0.3)
IMM GRANULOCYTES NFR BLD AUTO: 0.2 % — SIGNIFICANT CHANGE UP (ref 0.1–0.3)
INR BLD: 0.97 RATIO — SIGNIFICANT CHANGE UP (ref 0.65–1.3)
INR BLD: 0.97 RATIO — SIGNIFICANT CHANGE UP (ref 0.65–1.3)
LYMPHOCYTES # BLD AUTO: 1.84 K/UL — SIGNIFICANT CHANGE UP (ref 1.2–3.4)
LYMPHOCYTES # BLD AUTO: 1.84 K/UL — SIGNIFICANT CHANGE UP (ref 1.2–3.4)
LYMPHOCYTES # BLD AUTO: 36.9 % — SIGNIFICANT CHANGE UP (ref 20.5–51.1)
LYMPHOCYTES # BLD AUTO: 36.9 % — SIGNIFICANT CHANGE UP (ref 20.5–51.1)
MAGNESIUM SERPL-MCNC: 2.2 MG/DL — SIGNIFICANT CHANGE UP (ref 1.8–2.4)
MAGNESIUM SERPL-MCNC: 2.2 MG/DL — SIGNIFICANT CHANGE UP (ref 1.8–2.4)
MCHC RBC-ENTMCNC: 32.7 PG — HIGH (ref 27–31)
MCHC RBC-ENTMCNC: 32.7 PG — HIGH (ref 27–31)
MCHC RBC-ENTMCNC: 33.4 G/DL — SIGNIFICANT CHANGE UP (ref 32–37)
MCHC RBC-ENTMCNC: 33.4 G/DL — SIGNIFICANT CHANGE UP (ref 32–37)
MCV RBC AUTO: 97.9 FL — SIGNIFICANT CHANGE UP (ref 81–99)
MCV RBC AUTO: 97.9 FL — SIGNIFICANT CHANGE UP (ref 81–99)
MONOCYTES # BLD AUTO: 0.43 K/UL — SIGNIFICANT CHANGE UP (ref 0.1–0.6)
MONOCYTES # BLD AUTO: 0.43 K/UL — SIGNIFICANT CHANGE UP (ref 0.1–0.6)
MONOCYTES NFR BLD AUTO: 8.6 % — SIGNIFICANT CHANGE UP (ref 1.7–9.3)
MONOCYTES NFR BLD AUTO: 8.6 % — SIGNIFICANT CHANGE UP (ref 1.7–9.3)
NEUTROPHILS # BLD AUTO: 2.46 K/UL — SIGNIFICANT CHANGE UP (ref 1.4–6.5)
NEUTROPHILS # BLD AUTO: 2.46 K/UL — SIGNIFICANT CHANGE UP (ref 1.4–6.5)
NEUTROPHILS NFR BLD AUTO: 49.3 % — SIGNIFICANT CHANGE UP (ref 42.2–75.2)
NEUTROPHILS NFR BLD AUTO: 49.3 % — SIGNIFICANT CHANGE UP (ref 42.2–75.2)
NRBC # BLD: 0 /100 WBCS — SIGNIFICANT CHANGE UP (ref 0–0)
NRBC # BLD: 0 /100 WBCS — SIGNIFICANT CHANGE UP (ref 0–0)
PHOSPHATE SERPL-MCNC: 3.3 MG/DL — SIGNIFICANT CHANGE UP (ref 2.1–4.9)
PHOSPHATE SERPL-MCNC: 3.3 MG/DL — SIGNIFICANT CHANGE UP (ref 2.1–4.9)
PLATELET # BLD AUTO: 219 K/UL — SIGNIFICANT CHANGE UP (ref 130–400)
PLATELET # BLD AUTO: 219 K/UL — SIGNIFICANT CHANGE UP (ref 130–400)
PMV BLD: 9.9 FL — SIGNIFICANT CHANGE UP (ref 7.4–10.4)
PMV BLD: 9.9 FL — SIGNIFICANT CHANGE UP (ref 7.4–10.4)
POTASSIUM SERPL-MCNC: 3.9 MMOL/L — SIGNIFICANT CHANGE UP (ref 3.5–5)
POTASSIUM SERPL-MCNC: 3.9 MMOL/L — SIGNIFICANT CHANGE UP (ref 3.5–5)
POTASSIUM SERPL-MCNC: 4.4 MMOL/L — SIGNIFICANT CHANGE UP (ref 3.5–5)
POTASSIUM SERPL-MCNC: 4.4 MMOL/L — SIGNIFICANT CHANGE UP (ref 3.5–5)
POTASSIUM SERPL-SCNC: 3.9 MMOL/L — SIGNIFICANT CHANGE UP (ref 3.5–5)
POTASSIUM SERPL-SCNC: 3.9 MMOL/L — SIGNIFICANT CHANGE UP (ref 3.5–5)
POTASSIUM SERPL-SCNC: 4.4 MMOL/L — SIGNIFICANT CHANGE UP (ref 3.5–5)
POTASSIUM SERPL-SCNC: 4.4 MMOL/L — SIGNIFICANT CHANGE UP (ref 3.5–5)
PROT SERPL-MCNC: 6.9 G/DL — SIGNIFICANT CHANGE UP (ref 6–8)
PROT SERPL-MCNC: 6.9 G/DL — SIGNIFICANT CHANGE UP (ref 6–8)
PROT SERPL-MCNC: 7 G/DL — SIGNIFICANT CHANGE UP (ref 6–8)
PROT SERPL-MCNC: 7 G/DL — SIGNIFICANT CHANGE UP (ref 6–8)
PROTHROM AB SERPL-ACNC: 11.1 SEC — SIGNIFICANT CHANGE UP (ref 9.95–12.87)
PROTHROM AB SERPL-ACNC: 11.1 SEC — SIGNIFICANT CHANGE UP (ref 9.95–12.87)
RBC # BLD: 4.34 M/UL — SIGNIFICANT CHANGE UP (ref 4.2–5.4)
RBC # BLD: 4.34 M/UL — SIGNIFICANT CHANGE UP (ref 4.2–5.4)
RBC # FLD: 13.8 % — SIGNIFICANT CHANGE UP (ref 11.5–14.5)
RBC # FLD: 13.8 % — SIGNIFICANT CHANGE UP (ref 11.5–14.5)
RSV RNA NPH QL NAA+NON-PROBE: SIGNIFICANT CHANGE UP
RSV RNA NPH QL NAA+NON-PROBE: SIGNIFICANT CHANGE UP
SARS-COV-2 RNA SPEC QL NAA+PROBE: DETECTED
SARS-COV-2 RNA SPEC QL NAA+PROBE: DETECTED
SODIUM SERPL-SCNC: 139 MMOL/L — SIGNIFICANT CHANGE UP (ref 135–146)
SODIUM SERPL-SCNC: 139 MMOL/L — SIGNIFICANT CHANGE UP (ref 135–146)
SODIUM SERPL-SCNC: 145 MMOL/L — SIGNIFICANT CHANGE UP (ref 135–146)
SODIUM SERPL-SCNC: 145 MMOL/L — SIGNIFICANT CHANGE UP (ref 135–146)
TROPONIN T, HIGH SENSITIVITY RESULT: 9 NG/L — SIGNIFICANT CHANGE UP (ref 6–13)
TROPONIN T, HIGH SENSITIVITY RESULT: 9 NG/L — SIGNIFICANT CHANGE UP (ref 6–13)
WBC # BLD: 4.99 K/UL — SIGNIFICANT CHANGE UP (ref 4.8–10.8)
WBC # BLD: 4.99 K/UL — SIGNIFICANT CHANGE UP (ref 4.8–10.8)
WBC # FLD AUTO: 4.99 K/UL — SIGNIFICANT CHANGE UP (ref 4.8–10.8)
WBC # FLD AUTO: 4.99 K/UL — SIGNIFICANT CHANGE UP (ref 4.8–10.8)

## 2024-01-07 PROCEDURE — 80048 BASIC METABOLIC PNL TOTAL CA: CPT

## 2024-01-07 PROCEDURE — 99223 1ST HOSP IP/OBS HIGH 75: CPT

## 2024-01-07 PROCEDURE — 70498 CT ANGIOGRAPHY NECK: CPT | Mod: 26,MA

## 2024-01-07 PROCEDURE — 97162 PT EVAL MOD COMPLEX 30 MIN: CPT | Mod: GP

## 2024-01-07 PROCEDURE — 82962 GLUCOSE BLOOD TEST: CPT

## 2024-01-07 PROCEDURE — 80061 LIPID PANEL: CPT

## 2024-01-07 PROCEDURE — 70496 CT ANGIOGRAPHY HEAD: CPT | Mod: 26,MA

## 2024-01-07 PROCEDURE — 83036 HEMOGLOBIN GLYCOSYLATED A1C: CPT

## 2024-01-07 PROCEDURE — 0042T: CPT | Mod: MA

## 2024-01-07 PROCEDURE — 80076 HEPATIC FUNCTION PANEL: CPT

## 2024-01-07 PROCEDURE — 36415 COLL VENOUS BLD VENIPUNCTURE: CPT

## 2024-01-07 PROCEDURE — 70450 CT HEAD/BRAIN W/O DYE: CPT | Mod: 26,MA,59

## 2024-01-07 PROCEDURE — 70551 MRI BRAIN STEM W/O DYE: CPT | Mod: 26,MA

## 2024-01-07 PROCEDURE — 85027 COMPLETE CBC AUTOMATED: CPT

## 2024-01-07 PROCEDURE — 84443 ASSAY THYROID STIM HORMONE: CPT

## 2024-01-07 PROCEDURE — 93306 TTE W/DOPPLER COMPLETE: CPT

## 2024-01-07 PROCEDURE — 82746 ASSAY OF FOLIC ACID SERUM: CPT

## 2024-01-07 PROCEDURE — 83735 ASSAY OF MAGNESIUM: CPT

## 2024-01-07 PROCEDURE — 84100 ASSAY OF PHOSPHORUS: CPT

## 2024-01-07 PROCEDURE — 97166 OT EVAL MOD COMPLEX 45 MIN: CPT | Mod: GO

## 2024-01-07 PROCEDURE — 82607 VITAMIN B-12: CPT

## 2024-01-07 PROCEDURE — 92610 EVALUATE SWALLOWING FUNCTION: CPT | Mod: GN

## 2024-01-07 RX ORDER — OMEPRAZOLE 10 MG/1
1 CAPSULE, DELAYED RELEASE ORAL
Refills: 0 | DISCHARGE

## 2024-01-07 RX ORDER — CLOPIDOGREL BISULFATE 75 MG/1
300 TABLET, FILM COATED ORAL ONCE
Refills: 0 | Status: COMPLETED | OUTPATIENT
Start: 2024-01-07 | End: 2024-01-07

## 2024-01-07 RX ORDER — ACETAMINOPHEN 500 MG
975 TABLET ORAL ONCE
Refills: 0 | Status: COMPLETED | OUTPATIENT
Start: 2024-01-07 | End: 2024-01-07

## 2024-01-07 RX ORDER — PANTOPRAZOLE SODIUM 20 MG/1
1 TABLET, DELAYED RELEASE ORAL
Qty: 0 | Refills: 0 | DISCHARGE

## 2024-01-07 RX ORDER — ATORVASTATIN CALCIUM 80 MG/1
80 TABLET, FILM COATED ORAL AT BEDTIME
Refills: 0 | Status: DISCONTINUED | OUTPATIENT
Start: 2024-01-07 | End: 2024-01-09

## 2024-01-07 RX ORDER — ENOXAPARIN SODIUM 100 MG/ML
40 INJECTION SUBCUTANEOUS EVERY 24 HOURS
Refills: 0 | Status: DISCONTINUED | OUTPATIENT
Start: 2024-01-07 | End: 2024-01-09

## 2024-01-07 RX ORDER — ASPIRIN/CALCIUM CARB/MAGNESIUM 324 MG
81 TABLET ORAL DAILY
Refills: 0 | Status: DISCONTINUED | OUTPATIENT
Start: 2024-01-07 | End: 2024-01-09

## 2024-01-07 RX ORDER — PANTOPRAZOLE SODIUM 20 MG/1
40 TABLET, DELAYED RELEASE ORAL
Refills: 0 | Status: DISCONTINUED | OUTPATIENT
Start: 2024-01-07 | End: 2024-01-09

## 2024-01-07 RX ORDER — LANOLIN ALCOHOL/MO/W.PET/CERES
3 CREAM (GRAM) TOPICAL AT BEDTIME
Refills: 0 | Status: DISCONTINUED | OUTPATIENT
Start: 2024-01-07 | End: 2024-01-09

## 2024-01-07 RX ORDER — CLOPIDOGREL BISULFATE 75 MG/1
75 TABLET, FILM COATED ORAL DAILY
Refills: 0 | Status: DISCONTINUED | OUTPATIENT
Start: 2024-01-08 | End: 2024-01-09

## 2024-01-07 RX ORDER — ESCITALOPRAM OXALATE 10 MG/1
0 TABLET, FILM COATED ORAL
Qty: 0 | Refills: 2 | DISCHARGE

## 2024-01-07 RX ADMIN — Medication 975 MILLIGRAM(S): at 06:02

## 2024-01-07 RX ADMIN — Medication 3 MILLIGRAM(S): at 21:39

## 2024-01-07 RX ADMIN — CLOPIDOGREL BISULFATE 300 MILLIGRAM(S): 75 TABLET, FILM COATED ORAL at 11:08

## 2024-01-07 RX ADMIN — Medication 81 MILLIGRAM(S): at 11:09

## 2024-01-07 NOTE — H&P ADULT - PATIENT'S PREFERRED PRONOUN
Her/She
IBW used as pt exceeds 120% IBW (178%). Needs based on [facility] standards of care for older adults, adjusted for increased pro/ kcal needs 2/2 hypermetabolic state. Fluids per team.

## 2024-01-07 NOTE — H&P ADULT - NSICDXFAMILYHX_GEN_ALL_CORE_FT
FAMILY HISTORY:  Grandparent  Still living? No  Family history of stroke, Age at diagnosis: Age Unknown    Aunt  Still living? No  Family history of stroke, Age at diagnosis: Age Unknown    Uncle  Still living? No  Family history of stroke, Age at diagnosis: Age Unknown

## 2024-01-07 NOTE — H&P ADULT - HISTORY OF PRESENT ILLNESS
84 y/o woman with PMH of CAD S/P PCI X3 2007, 2009, 2015 (on ASA), HTN, GERD, Anmol's esophagus, ovarian cancer s/p DAVIS/BSO 44 y ago, s/p TAVR in 2022 presented to the ED for dizziness, unsteady gait. LKW 6PM on 1/6. Pt spoke with her son around this time and was at her baseline as per son. She had a fall at 6PM, unknown if she hit her head. Around 7:30PM, her neice spoke with her over the phone and felt she is not able to speak well, so when she came to her house ~10PM , her speaking was back to normal, however she mentioned having dizziness and unsteady gait. She was brought to the ED and stroke code activated. NIHSS 1 (L facial). CTH neg. /69 Ms. Montoya is a 84 y/o woman with PMH of CAD S/P PCI X3 2007, 2009, 2015 (on ASA), HTN, GERD, Anmol's esophagus, ovarian cancer s/p DAVIS/BSO 44 y ago, s/p TAVR in 2022 and chronic vertigo presented to the ED for dizziness, unsteady gait. LKW 6PM on 1/6.   She reported having lunch after [employer] w/ friends in which she had consumed a few glass of wine. Around 6pm when she got home she started to feel dizzy. She contacted her daughter about her symptoms when she was noted to have a left facial droop and weakness of the left body. She was then take to the ED.     She also reported symptoms of vertigo 11/2023 and was seen he doctor who administered steroid injections to her right ear. Since then she had not been able to hear well from the right hear.  Ms. Montoya is a 84 y/o woman with PMH of CAD S/P PCI X3 2007, 2009, 2015 (on ASA), HTN, GERD, Anmol's esophagus, ovarian cancer s/p DAVIS/BSO 44 y ago, s/p TAVR in 2022 and chronic vertigo presented to the ED for dizziness, unsteady gait. LKW 6PM on 1/6.   She reported having lunch after [employer] w/ friends in which she had consumed a few glass of wine. Around 6pm when she got home she started to feel dizzy. She contacted her daughter about her symptoms when she was noted to have a left facial droop and weakness of the left body. She was then take to the ED.     She also reported symptoms of vertigo 11/2023 and was seen he doctor who administered steroid injections to her right ear. Since then she had not been able to hear well from the right hear.

## 2024-01-07 NOTE — ED CDU PROVIDER INITIAL DAY NOTE - CLINICAL SUMMARY MEDICAL DECISION MAKING FREE TEXT BOX
Pt presents with left side facial weakness. Weakness persisted since arrival. Initial CT scan. MRI + lacunar infarcts. Pt admitted for acute stroke.

## 2024-01-07 NOTE — ED CDU PROVIDER INITIAL DAY NOTE - PHYSICAL EXAMINATION
CONSTITUTIONAL: Well-appearing; well-nourished; in no apparent distress.   HEAD: Normocephalic; atraumatic.   EYES: PERRL; EOM intact. Conjunctiva normal B/L.   ENT: Normal pharynx with no tonsillar hypertrophy. MMM.  NECK: Supple; non-tender; no cervical lymphadenopathy.   CHEST: Normal chest excursion with respiration.   CARDIOVASCULAR: Normal S1, S2; no murmurs, rubs, or gallops.   RESPIRATORY: Normal chest excursion with respiration; breath sounds clear and equal bilaterally; no wheezes, rhonchi, or rales.  GI/: Normal bowel sounds; non-distended; non-tender.  BACK: No evidence of trauma or deformity. Non-tender to palpation. No CVA tenderness.   EXT: Normal ROM in all four extremities; non-tender to palpation; distal pulses are normal. No leg edema B/L.   SKIN: Normal for age and race; warm; dry; good turgor.  NEURO: A & O x 4; CN 2-12 intact. NIH 1; nl walking; motor 5/5 in all ext;

## 2024-01-07 NOTE — ED PROVIDER NOTE - OBJECTIVE STATEMENT
84 y/o woman with PMH of CAD S/P PCI X3 2007, 2009, 2015 (on ASA), HTN, GERD, Anmol's esophagus, ovarian cancer s/p DAVIS/BSO 44 y ago, s/p TAVR in 2022 presented to the ED for dizziness, unsteady gait. LKW 6PM on 1/6. Pt spoke with her son around this time and was at her baseline as per son. She had a fall at 6PM, unknown if she hit her head. Around 7:30PM, her neice spoke with her over the phone and felt she is not able to speak well "slurring", so when she came to her house ~10PM , her speaking was back to normal, however she mentioned having dizziness and unsteady gait w/ L sided ptosis. She was brought to the ED and stroke code activated. NIHSS 1 (L facial). /69.

## 2024-01-07 NOTE — ED CDU PROVIDER INITIAL DAY NOTE - OBJECTIVE STATEMENT
82 y/o woman with PMH of CAD S/P PCI X3 2007, 2009, 2015 (on ASA), HTN, GERD, Anmol's esophagus, ovarian cancer s/p DAVIS/BSO 44 y ago, s/p TAVR in 2022 presented to the ED for dizziness, unsteady gait. LKW 6PM on 1/6. Pt spoke with her son around this time and was at her baseline as per son. She had a fall at 6PM, unknown if she hit her head. Around 7:30PM, her neice spoke with her over the phone and felt she is not able to speak well "slurring", so when she came to her house ~10PM , her speaking was back to normal, however she mentioned having dizziness and unsteady gait w/ L sided ptosis. She was brought to the ED and stroke code activated. NIHSS 1 (L facial). /69.

## 2024-01-07 NOTE — ED ADULT TRIAGE NOTE - CHIEF COMPLAINT QUOTE
Pt fell at home, around 6pm, as per family, they called her on the home at 7pm, and she sound like slurry speech between 7;30-9;30pm. c/o dizziness and "not feeling well. FS 78.Pt is has hx stroke with left facial droop. code stoke activated in triage

## 2024-01-07 NOTE — ED CDU PROVIDER INITIAL DAY NOTE - PROGRESS NOTE DETAILS
Patient resting comfortably awaiting MRI. Patient has left facial droop and left lower extremity weakness. Radiology called MRI showing L lacunar infarct.  Neurology MAD[facility] Ricketts made aware. [facility] wants patient admitted to stepdown due to covid+ under Dr Koenig. Spoke to ICU fellow Dr Garcia approves stepdown.

## 2024-01-07 NOTE — ED CDU PROVIDER DISPOSITION NOTE - ATTENDING CONTRIBUTION TO CARE
Pt presents with acute left sided facial weakness. Subacute left lower extremity weakness. On exam left facial slight weakness. MRI + left parietal lacunar infarct.

## 2024-01-07 NOTE — ED PROVIDER NOTE - CLINICAL SUMMARY MEDICAL DECISION MAKING FREE TEXT BOX
Throughout ED observation period, pt remained clinically and hemodynamically stable.  neuro exams showed improvementw/ nihss0  etoh level elevated  case dw neuro who rec obs for mri and monitoring

## 2024-01-07 NOTE — ED PROVIDER NOTE - PHYSICAL EXAMINATION
Hpi Title: Evaluation of Skin Lesions How Severe Are Your Spot(S)?: mild Have Your Spot(S) Been Treated In The Past?: has not been treated CONSTITUTIONAL: Well-appearing; well-nourished; in no apparent distress.   HEAD: Normocephalic; atraumatic.   EYES: PERRL; EOM intact. Conjunctiva normal B/L.   ENT: Normal pharynx with no tonsillar hypertrophy. MMM.  NECK: Supple; non-tender; no cervical lymphadenopathy.   CHEST: Normal chest excursion with respiration.   CARDIOVASCULAR: Normal S1, S2; no murmurs, rubs, or gallops.   RESPIRATORY: Normal chest excursion with respiration; breath sounds clear and equal bilaterally; no wheezes, rhonchi, or rales.  GI/: Normal bowel sounds; non-distended; non-tender.  BACK: No evidence of trauma or deformity. Non-tender to palpation. No CVA tenderness.   EXT: Normal ROM in all four extremities; non-tender to palpation; distal pulses are normal. No leg edema B/L.   SKIN: Normal for age and race; warm; dry; good turgor.  NEURO: A & O x 4; CN 2-12 intact. NIH 1; nl walking; motor 5/5 in all ext;

## 2024-01-07 NOTE — ED PROVIDER NOTE - ATTENDING CONTRIBUTION TO CARE
84 y/o woman with PMH of CAD S/P PCI X3 2007, 2009, 2015 (on ASA), HTN, GERD, Anmol's esophagus, ovarian cancer s/p DAVIS/BSO 44 y ago, s/p TAVR in 2022   pt presents for eval of multiple complaints. pt had an episode of dizziness and unsteady gait. last normal 6pm on 1/6.  time known because pt spoke with son at that point.  at 6pm, pt fell on a chair. pt does not recall hitting head. around 730pm, pt spoke to elia on the phone and was found to have slurred speech prompting family to visit/evaluate patient around 10pm.  speech had improved but pt was still c/o dizziness and unsteady gait prompting ed eval.  pt was stoke code on arrival    vss  gen- NAD, aaox3  card-rrr  lungs-ctab, no wheezing or rhonchi  abd-sntnd, no guarding or rebound  neuro- full str/sensation, cn ii-xii grossly intact, normal coordination, mild L facial droop, speech clear, normal coordination 82 y/o woman with PMH of CAD S/P PCI X3 2007, 2009, 2015 (on ASA), HTN, GERD, Anmol's esophagus, ovarian cancer s/p DAVIS/BSO 44 y ago, s/p TAVR in 2022   pt presents for eval of multiple complaints. pt had an episode of dizziness and unsteady gait. last normal 6pm on 1/6.  time known because pt spoke with son at that point.  at 6pm, pt fell on a chair. pt does not recall hitting head. around 730pm, pt spoke to elia on the phone and was found to have slurred speech prompting family to visit/evaluate patient around 10pm.  speech had improved but pt was still c/o dizziness and unsteady gait prompting ed eval.  pt was stoke code on arrival    vss  gen- NAD, aaox3  card-rrr  lungs-ctab, no wheezing or rhonchi  abd-sntnd, no guarding or rebound  neuro- full str/sensation, cn ii-xii grossly intact, normal coordination, mild L facial droop, speech clear, normal coordination

## 2024-01-07 NOTE — H&P ADULT - NSHPREVIEWOFSYSTEMS_GEN_ALL_CORE
CONSTITUTIONAL: No weakness, fevers or chills  EYES/ENT: Vertigo. No visual changes;  No  throat pain   NECK: No pain or stiffness  RESPIRATORY: No cough, wheezing, hemoptysis; No shortness of breath  CARDIOVASCULAR: No chest pain or palpitations  GASTROINTESTINAL: No abdominal or epigastric pain. No nausea, vomiting, or hematemesis; No diarrhea or constipation. No melena or hematochezia.  GENITOURINARY: No dysuria, frequency or hematuria  NEUROLOGICAL: No numbness or weakness  SKIN: No itching, burning, rashes, or lesions   All other review of systems is negative unless indicated above.

## 2024-01-07 NOTE — H&P ADULT - NSHPPHYSICALEXAM_GEN_ALL_CORE
Vital Signs Last 24 Hrs  T(C): 36 (07 Jan 2024 09:54), Max: 36.8 (07 Jan 2024 00:24)  T(F): 96.8 (07 Jan 2024 09:54), Max: 98.2 (07 Jan 2024 00:24)  HR: 82 (07 Jan 2024 09:54) (75 - 82)  BP: 135/75 (07 Jan 2024 09:54) (135/75 - 174/89)  RR: 18 (07 Jan 2024 09:54) (18 - 76)  SpO2: 97% (07 Jan 2024 09:54) (95% - 97%): ORA    Physical exam:  Constitutional: No acute distress, conversant  Eyes: Anicteric sclerae, moist conjunctivae, see below for CNs  Neck: trachea midline, FROM, supple, no thyromegaly or lymphadenopathy  Cardiovascular: Regular rate and rhythm, no murmurs, rubs, or gallops. No carotid bruits.   Pulmonary: Anterior breath sounds clear bilaterally, no crackles or wheezing. No use of accessory muscles  GI: Abdomen soft, non-distended, non-tender  Extremities: Radial and DP pulses +2, no edema    Neurologic:  -Mental status: Awake, alert, oriented to person, place, and time. Speech is fluent with intact naming, repetition, and comprehension, no dysarthria. Recent and remote memory intact. Follows commands. Attention/concentration intact. Fund of knowledge appropriate.  -Cranial nerves:   II: Visual fields are full to confrontation.  III, IV, VI: Extraocular movements are intact without nystagmus. Pupils equally round and reactive to light  V:  Facial sensation V1-V3 equal and intact   VII: Face is symmetric with normal eye closure and smile  VIII: Hearing is bilaterally intact to finger rub  IX, X: Uvula is midline and soft palate rises symmetrically  XI: Head turning and shoulder shrug are intact.  XII: Tongue protrudes midline  Motor: Normal bulk and tone. No pronator drift. Strength bilateral upper extremity 5/5, bilateral lower extremities 5/5.  Rapid alternating movements intact and symmetric  Sensation: Intact to light touch bilaterally. No neglect or extinction on double simultaneous testing.  Coordination: No dysmetria on finger-to-nose and heel-to-shin bilaterally  Reflexes: Downgoing toes bilaterally   Gait: Narrow gait and steady    NIHSS: 0 Vital Signs Last 24 Hrs  T(C): 36 (07 Jan 2024 09:54), Max: 36.8 (07 Jan 2024 00:24)  T(F): 96.8 (07 Jan 2024 09:54), Max: 98.2 (07 Jan 2024 00:24)  HR: 82 (07 Jan 2024 09:54) (75 - 82)  BP: 135/75 (07 Jan 2024 09:54) (135/75 - 174/89)  RR: 18 (07 Jan 2024 09:54) (18 - 76)  SpO2: 97% (07 Jan 2024 09:54) (95% - 97%): ORA    Physical exam:  Constitutional: Anxious, conversant  Eyes: Anicteric sclerae, moist conjunctivae, see below for CNs  Neck: trachea midline, FROM, supple,   Cardiovascular: S4,S1,S2, Regular rate and rhythm, no murmurs, rubs. No carotid bruits.   Pulmonary: Anterior breath sounds clear bilaterally, no crackles or wheezing. No use of accessory muscles  GI: Abdomen soft, non-distended, non-tender  Extremities: Radial and DP pulses +2, no edema    Neurologic:  -Mental status: Awake, alert, oriented to person, place, and time. Speech is fluent with intact naming, repetition, and comprehension, no dysarthria.  Follows commands.   -Cranial nerves:   II: Visual fields are full to confrontation.  III, IV, VI: Extraocular movements are intact without nystagmus. Pupils equally round and reactive to light  V:  Facial sensation V1-V3 equal and intact   VII: Left lower facial asymmetric compare to right  VIII: decrease hearing right to finger rub compare to left. Right EAC mild cerumen w/ dried blood on intact TM. Left EAC w/ bruising   IX, X: Uvula is midline and soft palate rises symmetrically  XI: Head turning and shoulder shrug are intact.  XII: Tongue protrudes midline  Motor: Normal bulk and tone. No pronator drift. Strength bilateral upper extremity 5/5, bilateral lower extremities 5/5. Mild postural and kinetic tremor worst in right compare to left  Sensation: Intact to light touch and vibration bilaterally.   Coordination: No dysmetria on finger-to-nose bilaterally  Reflexes: Downgoing toes bilaterally, 2+ b/l biceps, triceps, brachioradialis, patellar and achilles. LLE mild sustained clonus   Gait: Narrow gait and  mildly unsteady. Romberg Neg    NIHSS: 1 for Left lower facial asymmetry

## 2024-01-07 NOTE — H&P ADULT - NSHPSOCIALHISTORY_GEN_ALL_CORE
Lives alone in private apartment on [address]  Retired [employer]  Lives alone in private apartment [place] [address]  Retired [employer]

## 2024-01-07 NOTE — PROGRESS NOTE ADULT - SUBJECTIVE AND OBJECTIVE BOX
Neurology Consult    84 y/o woman with PMH of CAD S/P PCI X3 2007, 2009, 2015 (on ASA), HTN, GERD, Anmol's esophagus, ovarian cancer s/p DAVIS/BSO 44 y ago, s/p TAVR in 2022 presented to the ED for dizziness, unsteady gait. LKW 6PM on 1/6. Pt spoke with her son around this time and was at her baseline as per son. She had a fall at 6PM, unknown if she hit her head. Around 7:30PM, her neice spoke with her over the phone and felt she is not able to speak well, so when she came to her house ~10PM , her speaking was back to normal, however she mentioned having dizziness and unsteady gait. She was brought to the ED and stroke code activated. NIHSS 1 (L facial). CTH neg. /69.      PAST MEDICAL & SURGICAL HISTORY:  Hypertension      CAD (coronary artery disease)      GERD (gastroesophageal reflux disease)      Anxiety      H/O abdominal hysterectomy      H/O total knee replacement, right      S/P cholecystectomy          FAMILY HISTORY:      Social History: (-) x 3    Allergies    No Known Allergies    Intolerances        MEDICATIONS  (STANDING):    MEDICATIONS  (PRN):      Review of systems:    Constitutional: as per HPI  Eyes: No eye pain or discharge  ENMT:  No difficulty hearing; No sinus or throat pain  Neck: No pain or stiffness  Respiratory: No cough, wheezing, chills or hemoptysis  Cardiovascular: No chest pain, palpitations, shortness of breath, dyspnea on exertion  Gastrointestinal: No abdominal pain, nausea, vomiting or hematemesis; No diarrhea or constipation.   Genitourinary: No dysuria, frequency, hematuria or incontinence  Neurological: As per HPI  Skin: No rashes or lesions   Endocrine: No heat or cold intolerance; No hair loss  Musculoskeletal: No joint pain or swelling  Psychiatric: No depression, anxiety, mood swings  Heme/Lymph: No easy bruising or bleeding gums    Vital Signs Last 24 Hrs  T(C): 36.8 (07 Jan 2024 00:24), Max: 36.8 (07 Jan 2024 00:24)  T(F): 98.2 (07 Jan 2024 00:24), Max: 98.2 (07 Jan 2024 00:24)  HR: --  BP: 140/69 (07 Jan 2024 00:24) (140/69 - 140/69)  BP(mean): --  RR: 76 (07 Jan 2024 00:24) (76 - 76)  SpO2: 95% (07 Jan 2024 00:24) (95% - 95%)    Parameters below as of 07 Jan 2024 00:24  Patient On (Oxygen Delivery Method): room air        Neurological:  - Mental Status: AAOx3; speech is fluent with intact naming, repetition, and comprehension  - Cranial Nerves II -XII:  II: PERRLA; visual fields are full to confrontation  III, IV, VI: EOMI  V: facial sensation intact to light touch V1-V3 b/l  VII: L facial droop   VIII: hearing loss R early  XI: head turning and shoulder shrug intact b/l  XII: tongue protrusion midline  - Motor: strength is 5/5 b/l LLE & RLE, 5/5 b/l LUE & RUE. normal muscle bulk and tone throughout, no pronator drift  - Sensory: Intact fine touch, pain, temperature in UE and LE  - Cerebellum: No dysmetria in FTN   - Gait: unsteady gait, loosing balance  NIHSS: 1    Labs:   CBC Full  -  ( 07 Jan 2024 00:43 )  WBC Count : 4.99 K/uL  RBC Count : 4.34 M/uL  Hemoglobin : 14.2 g/dL  Hematocrit : 42.5 %  Platelet Count - Automated : 219 K/uL  Mean Cell Volume : 97.9 fL  Mean Cell Hemoglobin : 32.7 pg  Mean Cell Hemoglobin Concentration : 33.4 g/dL  Auto Neutrophil # : 2.46 K/uL  Auto Lymphocyte # : 1.84 K/uL  Auto Monocyte # : 0.43 K/uL  Auto Eosinophil # : 0.21 K/uL  Auto Basophil # : 0.04 K/uL  Auto Neutrophil % : 49.3 %  Auto Lymphocyte % : 36.9 %  Auto Monocyte % : 8.6 %  Auto Eosinophil % : 4.2 %  Auto Basophil % : 0.8 %    01-07    145  |  105  |  15  ----------------------------<  87  3.9   |  29  |  0.6<L>    Ca    9.6      07 Jan 2024 00:43    TPro  6.9  /  Alb  4.6  /  TBili  0.3  /  DBili  x   /  AST  13  /  ALT  10  /  AlkPhos  86  01-07    LIVER FUNCTIONS - ( 07 Jan 2024 00:43 )  Alb: 4.6 g/dL / Pro: 6.9 g/dL / ALK PHOS: 86 U/L / ALT: 10 U/L / AST: 13 U/L / GGT: x           PT/INR - ( 07 Jan 2024 00:43 )   PT: 11.10 sec;   INR: 0.97 ratio         PTT - ( 07 Jan 2024 00:43 )  PTT:31.3 sec  Urinalysis Basic - ( 07 Jan 2024 00:43 )    Color: x / Appearance: x / SG: x / pH: x  Gluc: 87 mg/dL / Ketone: x  / Bili: x / Urobili: x   Blood: x / Protein: x / Nitrite: x   Leuk Esterase: x / RBC: x / WBC x   Sq Epi: x / Non Sq Epi: x / Bacteria: x          [facility]  [facility]:     01-07-24 @ 02:06       Neurology Consult    82 y/o woman with PMH of CAD S/P PCI X3 2007, 2009, 2015 (on ASA), HTN, GERD, Anmol's esophagus, ovarian cancer s/p DAVIS/BSO 44 y ago, s/p TAVR in 2022 presented to the ED for dizziness, unsteady gait. LKW 6PM on 1/6. Pt spoke with her son around this time and was at her baseline as per son. She had a fall at 6PM, unknown if she hit her head. Around 7:30PM, her neice spoke with her over the phone and felt she is not able to speak well, so when she came to her house ~10PM , her speaking was back to normal, however she mentioned having dizziness and unsteady gait. She was brought to the ED and stroke code activated. NIHSS 1 (L facial). CTH neg. /69.      PAST MEDICAL & SURGICAL HISTORY:  Hypertension      CAD (coronary artery disease)      GERD (gastroesophageal reflux disease)      Anxiety      H/O abdominal hysterectomy      H/O total knee replacement, right      S/P cholecystectomy          FAMILY HISTORY:      Social History: (-) x 3    Allergies    No Known Allergies    Intolerances        MEDICATIONS  (STANDING):    MEDICATIONS  (PRN):      Review of systems:    Constitutional: as per HPI  Eyes: No eye pain or discharge  ENMT:  No difficulty hearing; No sinus or throat pain  Neck: No pain or stiffness  Respiratory: No cough, wheezing, chills or hemoptysis  Cardiovascular: No chest pain, palpitations, shortness of breath, dyspnea on exertion  Gastrointestinal: No abdominal pain, nausea, vomiting or hematemesis; No diarrhea or constipation.   Genitourinary: No dysuria, frequency, hematuria or incontinence  Neurological: As per HPI  Skin: No rashes or lesions   Endocrine: No heat or cold intolerance; No hair loss  Musculoskeletal: No joint pain or swelling  Psychiatric: No depression, anxiety, mood swings  Heme/Lymph: No easy bruising or bleeding gums    Vital Signs Last 24 Hrs  T(C): 36.8 (07 Jan 2024 00:24), Max: 36.8 (07 Jan 2024 00:24)  T(F): 98.2 (07 Jan 2024 00:24), Max: 98.2 (07 Jan 2024 00:24)  HR: --  BP: 140/69 (07 Jan 2024 00:24) (140/69 - 140/69)  BP(mean): --  RR: 76 (07 Jan 2024 00:24) (76 - 76)  SpO2: 95% (07 Jan 2024 00:24) (95% - 95%)    Parameters below as of 07 Jan 2024 00:24  Patient On (Oxygen Delivery Method): room air        Neurological:  - Mental Status: AAOx3; speech is fluent with intact naming, repetition, and comprehension  - Cranial Nerves II -XII:  II: PERRLA; visual fields are full to confrontation  III, IV, VI: EOMI  V: facial sensation intact to light touch V1-V3 b/l  VII: L facial droop   VIII: hearing loss R early  XI: head turning and shoulder shrug intact b/l  XII: tongue protrusion midline  - Motor: strength is 5/5 b/l LLE & RLE, 5/5 b/l LUE & RUE. normal muscle bulk and tone throughout, no pronator drift  - Sensory: Intact fine touch, pain, temperature in UE and LE  - Cerebellum: No dysmetria in FTN   - Gait: unsteady gait, loosing balance  NIHSS: 1    Labs:   CBC Full  -  ( 07 Jan 2024 00:43 )  WBC Count : 4.99 K/uL  RBC Count : 4.34 M/uL  Hemoglobin : 14.2 g/dL  Hematocrit : 42.5 %  Platelet Count - Automated : 219 K/uL  Mean Cell Volume : 97.9 fL  Mean Cell Hemoglobin : 32.7 pg  Mean Cell Hemoglobin Concentration : 33.4 g/dL  Auto Neutrophil # : 2.46 K/uL  Auto Lymphocyte # : 1.84 K/uL  Auto Monocyte # : 0.43 K/uL  Auto Eosinophil # : 0.21 K/uL  Auto Basophil # : 0.04 K/uL  Auto Neutrophil % : 49.3 %  Auto Lymphocyte % : 36.9 %  Auto Monocyte % : 8.6 %  Auto Eosinophil % : 4.2 %  Auto Basophil % : 0.8 %    01-07    145  |  105  |  15  ----------------------------<  87  3.9   |  29  |  0.6<L>    Ca    9.6      07 Jan 2024 00:43    TPro  6.9  /  Alb  4.6  /  TBili  0.3  /  DBili  x   /  AST  13  /  ALT  10  /  AlkPhos  86  01-07    LIVER FUNCTIONS - ( 07 Jan 2024 00:43 )  Alb: 4.6 g/dL / Pro: 6.9 g/dL / ALK PHOS: 86 U/L / ALT: 10 U/L / AST: 13 U/L / GGT: x           PT/INR - ( 07 Jan 2024 00:43 )   PT: 11.10 sec;   INR: 0.97 ratio         PTT - ( 07 Jan 2024 00:43 )  PTT:31.3 sec  Urinalysis Basic - ( 07 Jan 2024 00:43 )    Color: x / Appearance: x / SG: x / pH: x  Gluc: 87 mg/dL / Ketone: x  / Bili: x / Urobili: x   Blood: x / Protein: x / Nitrite: x   Leuk Esterase: x / RBC: x / WBC x   Sq Epi: x / Non Sq Epi: x / Bacteria: x          [facility]  [facility]     01-07-24 @ 02:06

## 2024-01-07 NOTE — ED CDU PROVIDER DISPOSITION NOTE - NS ED ATTENDING STATEMENT MOD
[facility] ambulatory encounter  FAMILY PRACTICE OFFICE VISIT    CHIEF COMPLAINT:    No chief complaint on file.      SUBJECTIVE:  Alcides Lopez is a 63 year old male who presented requesting evaluation for ***.     Diabetic eye exam??   A1C   Review of systems:    {ROS:1785395}     PROBLEM LIST:    Patient Active Problem List   Diagnosis   • HTN (hypertension)   • Diabetes mellitus, type 2 (CMS/HCC)   • Drug overdose, intentional (CMS/[facility])   • Blind left eye   • Depression   • Mixed hyperlipidemia   • Cognitive disorder   • Coronary atherosclerosis of native coronary artery   • GERD (gastroesophageal reflux disease)   • Hypotension due to drugs   • Hematuria   • Bicuspid aortic valve       MEDICATIONS:    Current Outpatient Medications on File Prior to Visit:  metoPROLOL tartrate (LOPRESSOR) 25 MG tablet   latanoprost (XALATAN) 0.005 % ophthalmic solution   Blood Glucose Monitoring Suppl ([employer] BLOOD GLUCOSE METER) w/Device Kit   ACCU-CHEK PATRICE PLUS test strip   atorvastatin (LIPITOR) 40 MG tablet   lisinopril (ZESTRIL) 2.5 MG tablet   empagliflozin (JARDIANCE) 25 MG tablet   metformin (GLUCOPHAGE-XR) 500 MG 24 hr tablet   pioglitazone (ACTOS) 45 MG tablet   SOFTCLIX LANCETS Misc   meclizine HCl (ANTIVERT) 25 MG tablet   aspirin 81 MG tablet   clotrimazole (LOTRIMIN) 1 % external solution   timolol (TIMOPTIC) 0.5 % ophthalmic solution   brimonidine (ALPHAGAN) 0.2 % ophthalmic solution     No current facility-administered medications on file prior to visit.     PAST HISTORIES:  {History Links:5615950::\"I have reviewed the past medical history, family history, social history, medications and allergies listed in the medical record as obtained by my nursing staff and support staff and agree with their documentation.\"}.    OBJECTIVE:  Physical Exam:    {PHYSICAL EXAM:4017729}    LAB RESULTS:  {LAB RESULTS - SUPER LIST:1478540}    Assessment and plan:   There are no diagnoses linked to this encounter.     Health  Maintenance Due   Topic Date Due   • Shingles Vaccine (1 of 2) 09/01/2006   • Diabetes Eye Exam  12/05/2017      No Follow-up on file.   {INFORM OF RESULTS:7616019::\"No labs or tests ordered.\"}    This patient was discussed with  ***.    Thai Yin, DO     Attending with

## 2024-01-07 NOTE — H&P ADULT - ATTENDING COMMENTS
83 year old woman w/ CAD, ASA, HTN, ovarian ca, TAVR, presented w/ transient vertigo now resolved. COVID positive. Incidental L. FP infarction.     Normal neurological exam     CTH neg  CTA h/n neg  MR brain punctate L. FP infarction.   A1c 5.6  LDL 79    Imp: Incidental L. FP infarction. Mechanism ESUS.     Plan:   ASA 81mg + Plavix 75mg for 3w followed by ASA 81mg indefinitely   Statin   TTE  ILR (can be done outpatient)   Gradual normotension   PT     ** minutes spent on total encounter. The necessity of the time spent during the encounter on this date of service was due to:     Review of imaging and chart; obtaining history; examination of pt; discussion and coordination of care, and discussion of lifestyle modification and risk factor control. 83 year old woman w/ CAD, ASA, HTN, ovarian ca, TAVR, presented w/ transient vertigo now resolved. COVID positive. Incidental L. FP infarction.     Normal neurological exam     CTH neg  CTA h/n neg  MR brain punctate L. FP infarction.   A1c 5.6  LDL 79    Imp: Incidental L. FP infarction. Mechanism ESUS.     Plan:   ASA 81mg + Plavix 75mg for 3w followed by ASA 81mg indefinitely   Statin   TTE  ILR (can be done outpatient)   Gradual normotension   PT     70 minutes spent on total encounter. The necessity of the time spent during the encounter on this date of service was due to:     Review of imaging and chart; obtaining history; examination of pt; discussion and coordination of care, and discussion of lifestyle modification and risk factor control.

## 2024-01-07 NOTE — H&P ADULT - NSHPLABSRESULTS_GEN_ALL_CORE
14.2   4.99  )-----------( 219      ( 07 Jan 2024 00:43 )             42.5     01-07    145  |  105  |  15  ----------------------------<  87  3.9   |  29  |  0.6<L>    Ca    9.6      07 Jan 2024 00:43    TPro  6.9  /  Alb  4.6  /  TBili  0.3  /  DBili  x   /  AST  13  /  ALT  10  /  AlkPhos  86  01-07    LIVER FUNCTIONS - ( 07 Jan 2024 00:43 )  Alb: 4.6 g/dL / Pro: 6.9 g/dL / ALK PHOS: 86 U/L / ALT: 10 U/L / AST: 13 U/L / GGT: x           PT/INR - ( 07 Jan 2024 00:43 )   PT: 11.10 sec;   INR: 0.97 ratio    PTT - ( 07 Jan 2024 00:43 )  PTT:31.3 sec    Alcohol, Blood: 135 mg/dL (01.07.24 @ 01:37)    MR Head No Cont (01.07.24 @ 08:16) > Left parietal cortical acute lacunar infarct.     CT Brain Stroke Protocol (01.07.24 @ 00:39) >  No acute intracranial pathology. Moderate chronic microvascular ischemic changes.    CT PERFUSION: No core infarct or ischemic penumbra.    CTA HEAD/NECK: No large vessel occlusion, high-grade stenosis, aneurysm, or vascular malformation.

## 2024-01-07 NOTE — PROGRESS NOTE ADULT - ASSESSMENT
82 y/o woman with PMH of CAD S/P PCI X3 2007, 2009, 2015 (on ASA), HTN, GERD, Anmol's esophagus, ovarian cancer s/p DAVIS/BSO 44 y ago, s/p TAVR in 2022 presented to the ED for dizziness, unsteady gait. LKW 6PM on 1/6. Pt spoke with her son around this time and was at her baseline as per son. She had a fall at 6PM, unknown if she hit her head. Around 7:30PM, her neice spoke with her over the phone and felt she is not able to speak well, so when she came to her house ~10PM , her speaking was back to normal, however she mentioned having dizziness and unsteady gait. She was brought to the ED and stroke code activated. NIHSS 1 (L facial). CTH neg. /69. CTA and CTP pending. Not a TNK candidate. She had a stroke code for dizziness and vertigo in 11/2023, CTH, CTA, MRI was neg at the time and she was dx with peripheral cause of vertigo.    Recommendations:  - keep her in obs for MRI Brain without con  - c/w ASA 81mg  - CTA and CTP pending read  - will follow imaging results    Case discussed with attending Dr. Koenig.    Gerardo Griggs MD  PGY2, Neurology 82 y/o woman with PMH of CAD S/P PCI X3 2007, 2009, 2015 (on ASA), HTN, GERD, Amnol's esophagus, ovarian cancer s/p DAVIS/BSO 44 y ago, s/p TAVR in 2022 presented to the ED for dizziness, unsteady gait. LKW 6PM on 1/6. Pt spoke with her son around this time and was at her baseline as per son. She had a fall at 6PM, unknown if she hit her head. Around 7:30PM, her neice spoke with her over the phone and felt she is not able to speak well, so when she came to her house ~10PM , her speaking was back to normal, however she mentioned having dizziness and unsteady gait. She was brought to the ED and stroke code activated. NIHSS 1 (L facial). CTH neg. /69. CTA and CTP pending. Not a TNK candidate. She had a stroke code for dizziness and vertigo in 11/2023, CTH, CTA, MRI was neg at the time and she was dx with peripheral cause of vertigo.    Recommendations:  - keep her in obs for MRI Brain without con  - c/w ASA 81mg  - CTA and CTP pending read  - will follow imaging results    Case discussed with attending Dr. Koenig.    Gerardo Griggs MD  PGY2, Neurology 82 y/o woman with PMH of CAD S/P PCI X3 2007, 2009, 2015 (on ASA), HTN, GERD, Anmol's esophagus, ovarian cancer s/p DAVIS/BSO 44 y ago, s/p TAVR in 2022 presented to the ED for dizziness, unsteady gait. LKW 6PM on 1/6. Pt spoke with her son around this time and was at her baseline as per son. She had a fall at 6PM, unknown if she hit her head. Around 7:30PM, her neice spoke with her over the phone and felt she is not able to speak well, so when she came to her house ~10PM , her speaking was back to normal, however she mentioned having dizziness and unsteady gait. She was brought to the ED and stroke code activated. NIHSS 1 (L facial). CTH neg. /69. CTA and CTP pending. Not a TNK candidate. She had a stroke code for dizziness and vertigo in 11/2023, CTH, CTA, MRI was neg at the time and she was dx with peripheral cause of vertigo.    Recommendations:  - keep her in obs for MRI Brain without con  - c/w ASA 81mg  - CTA and CTP pending read  - check blood alcohol level  - will follow imaging results    Case discussed with attending Dr. Koenig.    Gerardo Griggs MD  PGY2, Neurology 84 y/o woman with PMH of CAD S/P PCI X3 2007, 2009, 2015 (on ASA), HTN, GERD, Anmol's esophagus, ovarian cancer s/p DAVIS/BSO 44 y ago, s/p TAVR in 2022 presented to the ED for dizziness, unsteady gait. LKW 6PM on 1/6. Pt spoke with her son around this time and was at her baseline as per son. She had a fall at 6PM, unknown if she hit her head. Around 7:30PM, her neice spoke with her over the phone and felt she is not able to speak well, so when she came to her house ~10PM , her speaking was back to normal, however she mentioned having dizziness and unsteady gait. She was brought to the ED and stroke code activated. NIHSS 1 (L facial). CTH neg. /69. CTA and CTP pending. Not a TNK candidate. She had a stroke code for dizziness and vertigo in 11/2023, CTH, CTA, MRI was neg at the time and she was dx with peripheral cause of vertigo.    Recommendations:  - keep her in obs for MRI Brain without con  - c/w ASA 81mg  - CTA and CTP pending read  - check blood alcohol level  - will follow imaging results    Case discussed with attending Dr. Koenig.    Gerardo Griggs MD  PGY2, Neurology

## 2024-01-07 NOTE — H&P ADULT - NSICDXPASTMEDICALHX_GEN_ALL_CORE_FT
PAST MEDICAL HISTORY:  Anxiety     CAD (coronary artery disease)     GERD (gastroesophageal reflux disease)     Hypertension      PAST MEDICAL HISTORY:  Anxiety     CAD (coronary artery disease)     GERD (gastroesophageal reflux disease)     Hypertension     Vertigo

## 2024-01-07 NOTE — ED CDU PROVIDER DISPOSITION NOTE - CLINICAL COURSE
Pt presented with left facial weakness. Mild decrease of the left nasolabial fold. This finding persisted while in observation. MRI showed acute lacunar infarct. consultation with neurology advised admission for continued neuro stroke in light of acute infarct.

## 2024-01-07 NOTE — H&P ADULT - ASSESSMENT
Pt is a 84 yo F with PMHx of CAD s/p PCI on ASA, HTN, GERD, ovarian cancer, s/p TAVR, who presented with transient dizziness (milder to episode of vertigo in 11/2023). Denies any focal deficits. NIHSS 0. Covid positive, asymptomatic. MRI w/ incidental left FT punctate infarct. Will admit to Neurovascular service for complete stroke w/u and secondary prevention.     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 Lipid   - PT/OT/SLP/Rehab    #h/o HTN  - SBP goal 120-180mmHg     #EtOH use d/o   #  #CIWA protocol     #COVID pos  maintain SPO2 > 92%    DVT ppx : Lovenox 40mg SC Q24  GI ppx: h/o GERD - Pantoprazole 40mg QD  Diet: DASH/TLC   Activity: Pending PT/OT  Dispo: Acute    Pt is a 84 yo F with PMHx of CAD s/p PCI on ASA, HTN, GERD, ovarian cancer, s/p TAVR, who presented with transient dizziness (milder to episode of vertigo in 11/2023) and left facial. NIHSS 1. Covid positive, asymptomatic. Elevated BAL. MRI w/ incidental left FT punctate infarct. Will admit to Neurovascular service for complete stroke w/u and secondary prevention.     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 Lipid   - PT/OT/SLP/Rehab    #h/o HTN  - SBP goal 120-180mmHg     #EtOH use d/o   #  #CIWA protocol     #COVID pos  isolation precaution  maintain SPO2 > 92%    DVT ppx : Lovenox 40mg SC Q24  GI ppx: h/o GERD - Pantoprazole 40mg QD  Diet: DASH/TLC   Activity: Pending PT/OT  Dispo: Acute    Pt is a 84 yo F with PMHx of CAD s/p PCI on ASA, HTN, GERD, ovarian cancer, s/p TAVR, who presented with transient dizziness (milder to episode of vertigo in 11/2023) and left facial. NIHSS 1. Covid positive, asymptomatic. Elevated BAL. MRI w/ incidental left FP punctate infarct. Will admit to Neurovascular service for complete stroke w/u and secondary prevention.     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 Lipid   - PT/OT/SLP/Rehab    #h/o HTN  - SBP goal 120-180mmHg     #EtOH use d/o   #  #CIWA protocol     #COVID pos  isolation precaution  maintain SPO2 > 92%    DVT ppx : Lovenox 40mg SC Q24  GI ppx: h/o GERD - Pantoprazole 40mg QD  Diet: DASH/TLC   Activity: Pending PT/OT  Dispo: Acute

## 2024-01-08 ENCOUNTER — TRANSCRIPTION ENCOUNTER (OUTPATIENT)
Age: 84
End: 2024-01-08

## 2024-01-08 LAB
A1C WITH ESTIMATED AVERAGE GLUCOSE RESULT: 5.6 % — SIGNIFICANT CHANGE UP (ref 4–5.6)
A1C WITH ESTIMATED AVERAGE GLUCOSE RESULT: 5.6 % — SIGNIFICANT CHANGE UP (ref 4–5.6)
ANION GAP SERPL CALC-SCNC: 12 MMOL/L — SIGNIFICANT CHANGE UP (ref 7–14)
ANION GAP SERPL CALC-SCNC: 12 MMOL/L — SIGNIFICANT CHANGE UP (ref 7–14)
BUN SERPL-MCNC: 16 MG/DL — SIGNIFICANT CHANGE UP (ref 10–20)
BUN SERPL-MCNC: 16 MG/DL — SIGNIFICANT CHANGE UP (ref 10–20)
CALCIUM SERPL-MCNC: 9 MG/DL — SIGNIFICANT CHANGE UP (ref 8.4–10.5)
CALCIUM SERPL-MCNC: 9 MG/DL — SIGNIFICANT CHANGE UP (ref 8.4–10.5)
CHLORIDE SERPL-SCNC: 101 MMOL/L — SIGNIFICANT CHANGE UP (ref 98–110)
CHLORIDE SERPL-SCNC: 101 MMOL/L — SIGNIFICANT CHANGE UP (ref 98–110)
CHOLEST SERPL-MCNC: 169 MG/DL — SIGNIFICANT CHANGE UP
CHOLEST SERPL-MCNC: 169 MG/DL — SIGNIFICANT CHANGE UP
CO2 SERPL-SCNC: 24 MMOL/L — SIGNIFICANT CHANGE UP (ref 17–32)
CO2 SERPL-SCNC: 24 MMOL/L — SIGNIFICANT CHANGE UP (ref 17–32)
CREAT SERPL-MCNC: 0.7 MG/DL — SIGNIFICANT CHANGE UP (ref 0.7–1.5)
CREAT SERPL-MCNC: 0.7 MG/DL — SIGNIFICANT CHANGE UP (ref 0.7–1.5)
EGFR: 86 ML/MIN/1.73M2 — SIGNIFICANT CHANGE UP
EGFR: 86 ML/MIN/1.73M2 — SIGNIFICANT CHANGE UP
ESTIMATED AVERAGE GLUCOSE: 114 MG/DL — SIGNIFICANT CHANGE UP (ref 68–114)
ESTIMATED AVERAGE GLUCOSE: 114 MG/DL — SIGNIFICANT CHANGE UP (ref 68–114)
FOLATE SERPL-MCNC: 13.1 NG/ML — SIGNIFICANT CHANGE UP
FOLATE SERPL-MCNC: 13.1 NG/ML — SIGNIFICANT CHANGE UP
GLUCOSE BLDC GLUCOMTR-MCNC: 109 MG/DL — HIGH (ref 70–99)
GLUCOSE BLDC GLUCOMTR-MCNC: 109 MG/DL — HIGH (ref 70–99)
GLUCOSE SERPL-MCNC: 81 MG/DL — SIGNIFICANT CHANGE UP (ref 70–99)
GLUCOSE SERPL-MCNC: 81 MG/DL — SIGNIFICANT CHANGE UP (ref 70–99)
HCT VFR BLD CALC: 40.8 % — SIGNIFICANT CHANGE UP (ref 37–47)
HCT VFR BLD CALC: 40.8 % — SIGNIFICANT CHANGE UP (ref 37–47)
HDLC SERPL-MCNC: 77 MG/DL — SIGNIFICANT CHANGE UP
HDLC SERPL-MCNC: 77 MG/DL — SIGNIFICANT CHANGE UP
HGB BLD-MCNC: 13.8 G/DL — SIGNIFICANT CHANGE UP (ref 12–16)
HGB BLD-MCNC: 13.8 G/DL — SIGNIFICANT CHANGE UP (ref 12–16)
LIPID PNL WITH DIRECT LDL SERPL: 79 MG/DL — SIGNIFICANT CHANGE UP
LIPID PNL WITH DIRECT LDL SERPL: 79 MG/DL — SIGNIFICANT CHANGE UP
MAGNESIUM SERPL-MCNC: 2.2 MG/DL — SIGNIFICANT CHANGE UP (ref 1.8–2.4)
MAGNESIUM SERPL-MCNC: 2.2 MG/DL — SIGNIFICANT CHANGE UP (ref 1.8–2.4)
MCHC RBC-ENTMCNC: 32.2 PG — HIGH (ref 27–31)
MCHC RBC-ENTMCNC: 32.2 PG — HIGH (ref 27–31)
MCHC RBC-ENTMCNC: 33.8 G/DL — SIGNIFICANT CHANGE UP (ref 32–37)
MCHC RBC-ENTMCNC: 33.8 G/DL — SIGNIFICANT CHANGE UP (ref 32–37)
MCV RBC AUTO: 95.3 FL — SIGNIFICANT CHANGE UP (ref 81–99)
MCV RBC AUTO: 95.3 FL — SIGNIFICANT CHANGE UP (ref 81–99)
NON HDL CHOLESTEROL: 92 MG/DL — SIGNIFICANT CHANGE UP
NON HDL CHOLESTEROL: 92 MG/DL — SIGNIFICANT CHANGE UP
NRBC # BLD: 0 /100 WBCS — SIGNIFICANT CHANGE UP (ref 0–0)
NRBC # BLD: 0 /100 WBCS — SIGNIFICANT CHANGE UP (ref 0–0)
PHOSPHATE SERPL-MCNC: 3.7 MG/DL — SIGNIFICANT CHANGE UP (ref 2.1–4.9)
PHOSPHATE SERPL-MCNC: 3.7 MG/DL — SIGNIFICANT CHANGE UP (ref 2.1–4.9)
PLATELET # BLD AUTO: 224 K/UL — SIGNIFICANT CHANGE UP (ref 130–400)
PLATELET # BLD AUTO: 224 K/UL — SIGNIFICANT CHANGE UP (ref 130–400)
PMV BLD: 10 FL — SIGNIFICANT CHANGE UP (ref 7.4–10.4)
PMV BLD: 10 FL — SIGNIFICANT CHANGE UP (ref 7.4–10.4)
POTASSIUM SERPL-MCNC: 3.2 MMOL/L — LOW (ref 3.5–5)
POTASSIUM SERPL-MCNC: 3.2 MMOL/L — LOW (ref 3.5–5)
POTASSIUM SERPL-SCNC: 3.2 MMOL/L — LOW (ref 3.5–5)
POTASSIUM SERPL-SCNC: 3.2 MMOL/L — LOW (ref 3.5–5)
RBC # BLD: 4.28 M/UL — SIGNIFICANT CHANGE UP (ref 4.2–5.4)
RBC # BLD: 4.28 M/UL — SIGNIFICANT CHANGE UP (ref 4.2–5.4)
RBC # FLD: 13.8 % — SIGNIFICANT CHANGE UP (ref 11.5–14.5)
RBC # FLD: 13.8 % — SIGNIFICANT CHANGE UP (ref 11.5–14.5)
SODIUM SERPL-SCNC: 137 MMOL/L — SIGNIFICANT CHANGE UP (ref 135–146)
SODIUM SERPL-SCNC: 137 MMOL/L — SIGNIFICANT CHANGE UP (ref 135–146)
TRIGL SERPL-MCNC: 66 MG/DL — SIGNIFICANT CHANGE UP
TRIGL SERPL-MCNC: 66 MG/DL — SIGNIFICANT CHANGE UP
TSH SERPL-MCNC: 1.02 UIU/ML — SIGNIFICANT CHANGE UP (ref 0.27–4.2)
TSH SERPL-MCNC: 1.02 UIU/ML — SIGNIFICANT CHANGE UP (ref 0.27–4.2)
VIT B12 SERPL-MCNC: 276 PG/ML — SIGNIFICANT CHANGE UP (ref 232–1245)
VIT B12 SERPL-MCNC: 276 PG/ML — SIGNIFICANT CHANGE UP (ref 232–1245)
WBC # BLD: 4.93 K/UL — SIGNIFICANT CHANGE UP (ref 4.8–10.8)
WBC # BLD: 4.93 K/UL — SIGNIFICANT CHANGE UP (ref 4.8–10.8)
WBC # FLD AUTO: 4.93 K/UL — SIGNIFICANT CHANGE UP (ref 4.8–10.8)
WBC # FLD AUTO: 4.93 K/UL — SIGNIFICANT CHANGE UP (ref 4.8–10.8)

## 2024-01-08 PROCEDURE — 99233 SBSQ HOSP IP/OBS HIGH 50: CPT

## 2024-01-08 PROCEDURE — 93306 TTE W/DOPPLER COMPLETE: CPT | Mod: 26

## 2024-01-08 RX ORDER — ATORVASTATIN CALCIUM 80 MG/1
1 TABLET, FILM COATED ORAL
Qty: 0 | Refills: 0 | DISCHARGE
Start: 2024-01-08

## 2024-01-08 RX ORDER — POTASSIUM CHLORIDE 20 MEQ
20 PACKET (EA) ORAL ONCE
Refills: 0 | Status: COMPLETED | OUTPATIENT
Start: 2024-01-08 | End: 2024-01-08

## 2024-01-08 RX ORDER — CLOPIDOGREL BISULFATE 75 MG/1
1 TABLET, FILM COATED ORAL
Qty: 20 | Refills: 0
Start: 2024-01-08 | End: 2024-01-27

## 2024-01-08 RX ORDER — POTASSIUM CHLORIDE 20 MEQ
40 PACKET (EA) ORAL ONCE
Refills: 0 | Status: COMPLETED | OUTPATIENT
Start: 2024-01-08 | End: 2024-01-08

## 2024-01-08 RX ADMIN — ENOXAPARIN SODIUM 40 MILLIGRAM(S): 100 INJECTION SUBCUTANEOUS at 21:38

## 2024-01-08 RX ADMIN — Medication 20 MILLIEQUIVALENT(S): at 17:49

## 2024-01-08 RX ADMIN — CLOPIDOGREL BISULFATE 75 MILLIGRAM(S): 75 TABLET, FILM COATED ORAL at 13:05

## 2024-01-08 RX ADMIN — ATORVASTATIN CALCIUM 80 MILLIGRAM(S): 80 TABLET, FILM COATED ORAL at 21:38

## 2024-01-08 RX ADMIN — Medication 3 MILLIGRAM(S): at 21:38

## 2024-01-08 RX ADMIN — Medication 81 MILLIGRAM(S): at 14:08

## 2024-01-08 RX ADMIN — Medication 40 MILLIEQUIVALENT(S): at 10:29

## 2024-01-08 NOTE — OCCUPATIONAL THERAPY INITIAL EVALUATION ADULT - PERTINENT HX OF CURRENT PROBLEM, REHAB EVAL
82 y/o woman with PMH of CAD S/P PCI X3 2007, 2009, 2015 (on ASA), HTN, GERD, Anmol's esophagus, ovarian cancer s/p DAVIS/BSO 44 y ago, s/p TAVR in 2022 and chronic vertigo presented to the ED for dizziness, unsteady gait. LKW 6PM on 1/6.   She reported having lunch after [employer] w/ friends in which she had consumed a few glass of wine. Around 6pm when she got home she started to feel dizzy. She contacted her daughter about her symptoms when she was noted to have a left facial droop and weakness of the left body. She was then take to the ED.     She also reported symptoms of vertigo 11/2023 and was seen he doctor who administered steroid injections to her right ear. Since then she had not been able to hear well from the right hear.     Pt with left Frontoparietal acute ischemic punctate infarct (incidental) 84 y/o woman with PMH of CAD S/P PCI X3 2007, 2009, 2015 (on ASA), HTN, GERD, Anmol's esophagus, ovarian cancer s/p DAVIS/BSO 44 y ago, s/p TAVR in 2022 and chronic vertigo presented to the ED for dizziness, unsteady gait. LKW 6PM on 1/6.   She reported having lunch after [employer] w/ friends in which she had consumed a few glass of wine. Around 6pm when she got home she started to feel dizzy. She contacted her daughter about her symptoms when she was noted to have a left facial droop and weakness of the left body. She was then take to the ED.     She also reported symptoms of vertigo 11/2023 and was seen he doctor who administered steroid injections to her right ear. Since then she had not been able to hear well from the right hear.     Pt with left Frontoparietal acute ischemic punctate infarct (incidental)

## 2024-01-08 NOTE — CONSULT NOTE ADULT - SUBJECTIVE AND OBJECTIVE BOX
HPI:  Ms. Montoya is a 82 y/o woman with PMH of CAD S/P PCI X3 2007, 2009, 2015 (on ASA), HTN, GERD, Anmol's esophagus, ovarian cancer s/p DAVIS/BSO 44 y ago, s/p TAVR in 2022 and chronic vertigo presented to the ED for dizziness, unsteady gait. LKW 6PM on 1/6.   She reported having lunch after [employer] w/ friends in which she had consumed a few glass of wine. Around 6pm when she got home she started to feel dizzy. She contacted her daughter about her symptoms when she was noted to have a left facial droop and weakness of the left body. She was then take to the ED.     She also reported symptoms of vertigo 11/2023 and was seen he doctor who administered steroid injections to her right ear. Since then she had not been able to hear well from the right hear.      < from: MR Head No Cont (01.07.24 @ 08:16) >  IMPRESSION:  Left parietal cortical acute lacunar infarct.    < end of copied text >        PAST MEDICAL & SURGICAL HISTORY:  Hypertension      CAD (coronary artery disease)      GERD (gastroesophageal reflux disease)      Anxiety      Vertigo      H/O abdominal hysterectomy      H/O total knee replacement, right      S/P cholecystectomy          Hospital Course:    TODAY'S SUBJECTIVE & REVIEW OF SYMPTOMS:     Constitutional WNL   Cardio WNL   Resp WNL   GI WNL  Heme WNL  Endo WNL  Skin WNL  MSK WNL  Neuro dizziness / unsteady gait   Cognitive WNL  Psych WNL      MEDICATIONS  (STANDING):  aspirin  chewable 81 milliGRAM(s) Oral daily  atorvastatin 80 milliGRAM(s) Oral at bedtime  clopidogrel Tablet 75 milliGRAM(s) Oral daily  enoxaparin Injectable 40 milliGRAM(s) SubCutaneous every 24 hours  melatonin 3 milliGRAM(s) Oral at bedtime  pantoprazole    Tablet 40 milliGRAM(s) Oral before breakfast    MEDICATIONS  (PRN):  LORazepam     Tablet 0.5 milliGRAM(s) Oral every 2 hours PRN CIWA-Ar score increase by 2 points and a total score of 7 or less      FAMILY HISTORY:  Family history of stroke (Grandparent, Aunt, Uncle)        Allergies    No Known Allergies    Intolerances        SOCIAL HISTORY:    [  ] Etoh  [  ] Smoking  [  ] Substance abuse     Home Environment:  [x   ] Home Alone  [   ] Lives with Family  [   ] Home Health Aid    Dwelling:  [   ] Apartment  [ x  ] Private House  [   ] Adult Home  [   ] Skilled Nursing Facility      [   ] Short Term  [   ] Long Term  [x   ] Stairs       Elevator [   ]    FUNCTIONAL STATUS PTA: (Check all that apply)  Ambulation: [ x   ]Independent    [   ] Dependent     [   ] Non-Ambulatory  Assistive Device: [ x  ] SA Cane  [   ]  Q Cane  [   ] Walker  [   ]  Wheelchair  ADL : [  x ] Independent  [    ]  Dependent       Vital Signs Last 24 Hrs  T(C): 36.4 (08 Jan 2024 17:42), Max: 36.8 (08 Jan 2024 07:00)  T(F): 97.6 (08 Jan 2024 17:42), Max: 98.3 (08 Jan 2024 07:00)  HR: 73 (08 Jan 2024 17:42) (69 - 88)  BP: 133/88 (08 Jan 2024 17:42) (133/88 - 170/85)  BP(mean): 112 (07 Jan 2024 21:30) (112 - 112)  RR: 18 (08 Jan 2024 17:42) (18 - 19)  SpO2: 96% (08 Jan 2024 17:42) (96% - 99%)    Parameters below as of 08 Jan 2024 17:42  Patient On (Oxygen Delivery Method): room air          PHYSICAL EXAM: Awake & Alert  GENERAL: NAD  HEAD:  Normocephalic  CHEST/LUNG: Clear   HEART: S1S2+  ABDOMEN: Soft, Nontender  EXTREMITIES:  no calf tenderness    NERVOUS SYSTEM:  Cranial Nerves 2-12 intact [ x  ] Abnormal  [   ]  ROM: WFL all extremities [ x  ]  Abnormal [   ]  Motor Strength: WFL all extremities  [x   ]  Abnormal [   ]  Sensation: intact to light touch [ x  ] Abnormal [   ]    FUNCTIONAL STATUS:  Bed Mobility: Independent [   ]  Supervision [ x  ]  Needs Assistance [   ]  N/A [   ]  Transfers: Independent [   ]  Supervision [x   ]  Needs Assistance [   ]  N/A [   ]   Ambulation: Independent [   ]  Supervision [  x ]  Needs Assistance [   ]  N/A [   ]  ADL: Independent [   ] Requires Assistance [   ] N/A [   ]      LABS:                        13.8   4.93  )-----------( 224      ( 08 Jan 2024 07:32 )             40.8     01-08    137  |  101  |  16  ----------------------------<  81  3.2<L>   |  24  |  0.7    Ca    9.0      08 Jan 2024 07:32  Phos  3.7     01-08  Mg     2.2     01-08    TPro  7.0  /  Alb  4.5  /  TBili  0.9  /  DBili  0.2  /  AST  17  /  ALT  9   /  AlkPhos  85  01-07    PT/INR - ( 07 Jan 2024 00:43 )   PT: 11.10 sec;   INR: 0.97 ratio         PTT - ( 07 Jan 2024 00:43 )  PTT:31.3 sec  Urinalysis Basic - ( 08 Jan 2024 07:32 )    Color: x / Appearance: x / SG: x / pH: x  Gluc: 81 mg/dL / Ketone: x  / Bili: x / Urobili: x   Blood: x / Protein: x / Nitrite: x   Leuk Esterase: x / RBC: x / WBC x   Sq Epi: x / Non Sq Epi: x / Bacteria: x        RADIOLOGY & ADDITIONAL STUDIES:   HPI:  Ms. Montoya is a 84 y/o woman with PMH of CAD S/P PCI X3 2007, 2009, 2015 (on ASA), HTN, GERD, Anmol's esophagus, ovarian cancer s/p DAVIS/BSO 44 y ago, s/p TAVR in 2022 and chronic vertigo presented to the ED for dizziness, unsteady gait. LKW 6PM on 1/6.   She reported having lunch after [employer] w/ friends in which she had consumed a few glass of wine. Around 6pm when she got home she started to feel dizzy. She contacted her daughter about her symptoms when she was noted to have a left facial droop and weakness of the left body. She was then take to the ED.     She also reported symptoms of vertigo 11/2023 and was seen he doctor who administered steroid injections to her right ear. Since then she had not been able to hear well from the right hear.      < from: MR Head No Cont (01.07.24 @ 08:16) >  IMPRESSION:  Left parietal cortical acute lacunar infarct.    < end of copied text >        PAST MEDICAL & SURGICAL HISTORY:  Hypertension      CAD (coronary artery disease)      GERD (gastroesophageal reflux disease)      Anxiety      Vertigo      H/O abdominal hysterectomy      H/O total knee replacement, right      S/P cholecystectomy          Hospital Course:    TODAY'S SUBJECTIVE & REVIEW OF SYMPTOMS:     Constitutional WNL   Cardio WNL   Resp WNL   GI WNL  Heme WNL  Endo WNL  Skin WNL  MSK WNL  Neuro dizziness / unsteady gait   Cognitive WNL  Psych WNL      MEDICATIONS  (STANDING):  aspirin  chewable 81 milliGRAM(s) Oral daily  atorvastatin 80 milliGRAM(s) Oral at bedtime  clopidogrel Tablet 75 milliGRAM(s) Oral daily  enoxaparin Injectable 40 milliGRAM(s) SubCutaneous every 24 hours  melatonin 3 milliGRAM(s) Oral at bedtime  pantoprazole    Tablet 40 milliGRAM(s) Oral before breakfast    MEDICATIONS  (PRN):  LORazepam     Tablet 0.5 milliGRAM(s) Oral every 2 hours PRN CIWA-Ar score increase by 2 points and a total score of 7 or less      FAMILY HISTORY:  Family history of stroke (Grandparent, Aunt, Uncle)        Allergies    No Known Allergies    Intolerances        SOCIAL HISTORY:    [  ] Etoh  [  ] Smoking  [  ] Substance abuse     Home Environment:  [x   ] Home Alone  [   ] Lives with Family  [   ] Home Health Aid    Dwelling:  [   ] Apartment  [ x  ] Private House  [   ] Adult Home  [   ] Skilled Nursing Facility      [   ] Short Term  [   ] Long Term  [x   ] Stairs       Elevator [   ]    FUNCTIONAL STATUS PTA: (Check all that apply)  Ambulation: [ x   ]Independent    [   ] Dependent     [   ] Non-Ambulatory  Assistive Device: [ x  ] SA Cane  [   ]  Q Cane  [   ] Walker  [   ]  Wheelchair  ADL : [  x ] Independent  [    ]  Dependent       Vital Signs Last 24 Hrs  T(C): 36.4 (08 Jan 2024 17:42), Max: 36.8 (08 Jan 2024 07:00)  T(F): 97.6 (08 Jan 2024 17:42), Max: 98.3 (08 Jan 2024 07:00)  HR: 73 (08 Jan 2024 17:42) (69 - 88)  BP: 133/88 (08 Jan 2024 17:42) (133/88 - 170/85)  BP(mean): 112 (07 Jan 2024 21:30) (112 - 112)  RR: 18 (08 Jan 2024 17:42) (18 - 19)  SpO2: 96% (08 Jan 2024 17:42) (96% - 99%)    Parameters below as of 08 Jan 2024 17:42  Patient On (Oxygen Delivery Method): room air          PHYSICAL EXAM: Awake & Alert  GENERAL: NAD  HEAD:  Normocephalic  CHEST/LUNG: Clear   HEART: S1S2+  ABDOMEN: Soft, Nontender  EXTREMITIES:  no calf tenderness    NERVOUS SYSTEM:  Cranial Nerves 2-12 intact [ x  ] Abnormal  [   ]  ROM: WFL all extremities [ x  ]  Abnormal [   ]  Motor Strength: WFL all extremities  [x   ]  Abnormal [   ]  Sensation: intact to light touch [ x  ] Abnormal [   ]    FUNCTIONAL STATUS:  Bed Mobility: Independent [   ]  Supervision [ x  ]  Needs Assistance [   ]  N/A [   ]  Transfers: Independent [   ]  Supervision [x   ]  Needs Assistance [   ]  N/A [   ]   Ambulation: Independent [   ]  Supervision [  x ]  Needs Assistance [   ]  N/A [   ]  ADL: Independent [   ] Requires Assistance [   ] N/A [   ]      LABS:                        13.8   4.93  )-----------( 224      ( 08 Jan 2024 07:32 )             40.8     01-08    137  |  101  |  16  ----------------------------<  81  3.2<L>   |  24  |  0.7    Ca    9.0      08 Jan 2024 07:32  Phos  3.7     01-08  Mg     2.2     01-08    TPro  7.0  /  Alb  4.5  /  TBili  0.9  /  DBili  0.2  /  AST  17  /  ALT  9   /  AlkPhos  85  01-07    PT/INR - ( 07 Jan 2024 00:43 )   PT: 11.10 sec;   INR: 0.97 ratio         PTT - ( 07 Jan 2024 00:43 )  PTT:31.3 sec  Urinalysis Basic - ( 08 Jan 2024 07:32 )    Color: x / Appearance: x / SG: x / pH: x  Gluc: 81 mg/dL / Ketone: x  / Bili: x / Urobili: x   Blood: x / Protein: x / Nitrite: x   Leuk Esterase: x / RBC: x / WBC x   Sq Epi: x / Non Sq Epi: x / Bacteria: x        RADIOLOGY & ADDITIONAL STUDIES:

## 2024-01-08 NOTE — PROGRESS NOTE ADULT - SUBJECTIVE AND OBJECTIVE BOX
Neurology Stroke Progress Note    INTERVAL HPI/OVERNIGHT EVENTS:  Patient seen and examined. Pt was found to have incidental infarct in L parietal region. No events overnight.     MEDICATIONS  (STANDING):  aspirin  chewable 81 milliGRAM(s) Oral daily  atorvastatin 80 milliGRAM(s) Oral at bedtime  clopidogrel Tablet 75 milliGRAM(s) Oral daily  enoxaparin Injectable 40 milliGRAM(s) SubCutaneous every 24 hours  melatonin 3 milliGRAM(s) Oral at bedtime  pantoprazole    Tablet 40 milliGRAM(s) Oral before breakfast  potassium chloride    Tablet ER 20 milliEquivalent(s) Oral once    MEDICATIONS  (PRN):  LORazepam     Tablet 0.5 milliGRAM(s) Oral every 2 hours PRN CIWA-Ar score increase by 2 points and a total score of 7 or less    Allergies    No Known Allergies    Intolerances      Vital Signs Last 24 Hrs  T(C): 36.1 (08 Jan 2024 07:58), Max: 36.8 (07 Jan 2024 16:25)  T(F): 96.9 (08 Jan 2024 07:58), Max: 98.3 (08 Jan 2024 07:00)  HR: 74 (08 Jan 2024 12:00) (69 - 88)  BP: 170/85 (08 Jan 2024 12:00) (140/83 - 170/85)  BP(mean): 112 (07 Jan 2024 21:30) (112 - 112)  RR: 18 (08 Jan 2024 12:00) (18 - 19)  SpO2: 97% (08 Jan 2024 12:00) (97% - 99%)    Parameters below as of 08 Jan 2024 12:00  Patient On (Oxygen Delivery Method): room air      Physical exam at discharge:  Neurologic:  -Mental status: Awake, alert, oriented to person, place, and time. Speech is fluent with intact naming, repetition, and comprehension, no dysarthria.  Follows commands.   -Cranial nerves:   II: Visual fields are full to confrontation.  III, IV, VI: Extraocular movements are intact without nystagmus. Pupils equally round and reactive to light  V:  Facial sensation V1-V3 equal and intact   VII: Left lower facial asymmetric compare to right  VIII: decrease hearing right to finger rub compare to left. Right EAC mild cerumen w/ dried blood on intact TM. Left EAC w/ bruising   IX, X: Uvula is midline and soft palate rises symmetrically  XI: Head turning and shoulder shrug are intact.  XII: Tongue protrudes midline  Motor: Normal bulk and tone. No pronator drift. Strength bilateral upper extremity 5/5, bilateral lower extremities 5/5. Mild postural and kinetic tremor worst in right compare to left  Sensation: Intact to light touch and vibration bilaterally.   Coordination: No dysmetria on finger-to-nose bilaterally  Reflexes: Downgoing toes bilaterally, 2+ b/l biceps, triceps, brachioradialis, patellar and achilles. LLE mild sustained clonus   Gait: Narrow gait and  mildly unsteady. Romberg Neg    NIHSS: 1 for Left lower facial asymmetry    LABS:                        13.8   4.93  )-----------( 224      ( 08 Jan 2024 07:32 )             40.8     01-08    137  |  101  |  16  ----------------------------<  81  3.2<L>   |  24  |  0.7    Ca    9.0      08 Jan 2024 07:32  Phos  3.7     01-08  Mg     2.2     01-08    TPro  7.0  /  Alb  4.5  /  TBili  0.9  /  DBili  0.2  /  AST  17  /  ALT  9   /  AlkPhos  85  01-07    PT/INR - ( 07 Jan 2024 00:43 )   PT: 11.10 sec;   INR: 0.97 ratio         PTT - ( 07 Jan 2024 00:43 )  PTT:31.3 sec  Urinalysis Basic - ( 08 Jan 2024 07:32 )    Color: x / Appearance: x / SG: x / pH: x  Gluc: 81 mg/dL / Ketone: x  / Bili: x / Urobili: x   Blood: x / Protein: x / Nitrite: x   Leuk Esterase: x / RBC: x / WBC x   Sq Epi: x / Non Sq Epi: x / Bacteria: x        RADIOLOGY & ADDITIONAL TESTS: Reviewed

## 2024-01-08 NOTE — DISCHARGE NOTE PROVIDER - NSDCMRMEDTOKEN_GEN_ALL_CORE_FT
aspirin 81 mg oral tablet, chewable: 1 tab(s) orally once a day  ESCITALOPRAM OXALATE 5MG TABS: TAKE ONE TABLET BY MOUTH EVERY DAY  OMEPRAZOLE DR 40 MG CAPSULE: 1 cap(s) orally once a day   aspirin 81 mg oral tablet, chewable: 1 tab(s) orally once a day  atorvastatin 80 mg oral tablet: 1 tab(s) orally once a day (at bedtime)  clopidogrel 75 mg oral tablet: 1 tab(s) orally once a day  ESCITALOPRAM OXALATE 5MG TABS: TAKE ONE TABLET BY MOUTH EVERY DAY  OMEPRAZOLE DR 40 MG CAPSULE: 1 cap(s) orally once a day

## 2024-01-08 NOTE — DISCHARGE NOTE PROVIDER - CARE PROVIDERS DIRECT ADDRESSES
,DirectAddress_Unknown,[email or web address] ,[email or web address],[email or web address] ,DirectAddress_Unknown,[email or web address]

## 2024-01-08 NOTE — PHYSICAL THERAPY INITIAL EVALUATION ADULT - GENERAL OBSERVATIONS, REHAB EVAL
9:05-9:25 pt encountered in ED stretcher In NAD, +tele, cooperative.  Patient performed bed mobility, transfers, and ambulated with a cane, has minimal balance deficits at her baseline.  Pt would benefit from continued PT to restore prior level of mobility and safety.

## 2024-01-08 NOTE — PHYSICAL THERAPY INITIAL EVALUATION ADULT - PERTINENT HX OF CURRENT PROBLEM, REHAB EVAL
82 y/o woman with PMH of CAD S/P PCI X3 2007, 2009, 2015 (on ASA), HTN, GERD, Anmol's esophagus, ovarian cancer s/p DAVIS/BSO 44 y ago, s/p TAVR in 2022 presented to the ED for dizziness, unsteady gait. LKW 6PM on 1/6. Pt spoke with her son around this time and was at her baseline as per son. She had a fall at 6PM, unknown if she hit her head. Around 7:30PM, her neice spoke with her over the phone and felt she is not able to speak well, so when she came to her house ~10PM , her speaking was back to normal, however she mentioned having dizziness and unsteady gait. She was brought to the ED and stroke code activated. NIHSS 1 (L facial). CTH neg. /69.

## 2024-01-08 NOTE — OCCUPATIONAL THERAPY INITIAL EVALUATION ADULT - ADDITIONAL COMMENTS
Pt reports was independent with ADLS, cooking and cleaning. Pt son takes pt shopping 2* pt doesn't drive. Pt reports getting tired very quickly.

## 2024-01-08 NOTE — DISCHARGE NOTE PROVIDER - CARE PROVIDER_API CALL
Kodi Koenig  Neurology  [address] [place] [address]  Phone: [phone]  Fax: [phone]  Follow Up Time:     Rich Lopez  Cardiac Electrophysiology  [address], [address]  [place] [address]  Phone: [phone]  Fax: [phone]  Follow Up Time:     Aman Gaona  Otolaryngology  [address] [place] [address]  Phone: [phone]  Fax: [phone]  Follow Up Time:    Kodi Koenig  Neurology  [address] [place] [address]  Phone: [phone]  Fax: [phone]  Follow Up Time:     Rich Lopez  Cardiac Electrophysiology  [address] [facility] [facility] [address], [address]  [place] [address]  Phone: [phone]  Fax: [phone]  Follow Up Time:     Aman Gaona  Otolaryngology  [address] [place] [address]  Phone: [phone]  Fax: [phone]  Follow Up Time:    Kodi Koenig  Neurology  [address] [place] [address]  Phone: [phone]  Fax: [phone]  Follow Up Time:     Rich Lopez  Cardiac Electrophysiology  [address] [facility], [address]  [place] [address]  Phone: [phone]  Fax: [phone]  Follow Up Time:     Aman Gaona  Otolaryngology  [address] [place] [address]  Phone: [phone]  Fax: [phone]  Follow Up Time:

## 2024-01-08 NOTE — OCCUPATIONAL THERAPY INITIAL EVALUATION ADULT - GENERAL OBSERVATIONS, REHAB EVAL
Pt encountered supine in bed +telemoniter +call bell +bed alarm. Pt seen 8:20-8:45. BP taken supine 172/96. Following standing /92.

## 2024-01-08 NOTE — DISCHARGE NOTE PROVIDER - HOSPITAL COURSE
Hospital course:  Ms. Montoya is a 84 y/o woman with PMH of CAD S/P PCI X3 2007, 2009, 2015 (on ASA), HTN, GERD, Anmol's esophagus, ovarian cancer s/p DAVIS/BSO 44 y ago, s/p TAVR in 2022 and chronic vertigo presented to the ED for dizziness, unsteady gait. LKW 6PM on 1/6.   She reported having lunch after [employer] w/ friends in which she had consumed a few glass of wine. Around 6pm when she got home she started to feel dizzy. She contacted her daughter about her symptoms when she was noted to have a left facial droop and weakness of the left body. She was then take to the ED.     She also reported symptoms of vertigo 11/2023 and was seen he doctor who administered steroid injections to her right ear. Since then she had not been able to hear well from the right hear.  (07 Jan 2024 15:28) Her Blood alcohol level was 135 during this ED visit.    During this hospital course, patient had incidental  ischemic  infarct located in left parietal region as seen on MRI.   The stroke etiology is likely secondary to:  []atrial fibrillation  []small vessel disease from atherosclerotic risk factors  [x]other: ESUS    This  infarct doesnt explain her symptoms which are likely due to peripheral vertigo, she f/u with ENT.    Patient had the following workup done in house:  CT Head: neg for acute pathology  MR Head Non Contrast: Left parietal cortical acute lacunar infarct.  CT Angio Head and neck: No large vessel occlusion, high-grade stenosis, aneurysm,   or vascular malformation.  CTP: no mismatch  []echo TTE  []labs   []other    EP was consulted for ILR but they cant do it bc pt is covid positive (asymptomatic), so will done o/p.    Physical exam at discharge:  Neurologic:  -Mental status: Awake, alert, oriented to person, place, and time. Speech is fluent with intact naming, repetition, and comprehension, no dysarthria.  Follows commands.   -Cranial nerves:   II: Visual fields are full to confrontation.  III, IV, VI: Extraocular movements are intact without nystagmus. Pupils equally round and reactive to light  V:  Facial sensation V1-V3 equal and intact   VII: Left lower facial asymmetric compare to right  VIII: decrease hearing right to finger rub compare to left. Right EAC mild cerumen w/ dried blood on intact TM. Left EAC w/ bruising   IX, X: Uvula is midline and soft palate rises symmetrically  XI: Head turning and shoulder shrug are intact.  XII: Tongue protrudes midline  Motor: Normal bulk and tone. No pronator drift. Strength bilateral upper extremity 5/5, bilateral lower extremities 5/5. Mild postural and kinetic tremor worst in right compare to left  Sensation: Intact to light touch and vibration bilaterally.   Coordination: No dysmetria on finger-to-nose bilaterally  Reflexes: Downgoing toes bilaterally, 2+ b/l biceps, triceps, brachioradialis, patellar and achilles. LLE mild sustained clonus   Gait: Narrow gait and  mildly unsteady. Romberg Neg    NIHSS: 1 for Left lower facial asymmetry  mRS during discharge: 1    New medications on discharge: DAPT 21 days then asa alone.  Further outpatient workup: needs to get ILR o/p EP follow up. Stroke clinic f/u. ENT f/u    stable for dc today.   Hospital course:  Ms. Montoya is a 82 y/o woman with PMH of CAD S/P PCI X3 2007, 2009, 2015 (on ASA), HTN, GERD, Anmol's esophagus, ovarian cancer s/p DAVIS/BSO 44 y ago, s/p TAVR in 2022 and chronic vertigo presented to the ED for dizziness, unsteady gait. LKW 6PM on 1/6.   She reported having lunch after [employer] w/ friends in which she had consumed a few glass of wine. Around 6pm when she got home she started to feel dizzy. She contacted her daughter about her symptoms when she was noted to have a left facial droop and weakness of the left body. She was then take to the ED.     She also reported symptoms of vertigo 11/2023 and was seen he doctor who administered steroid injections to her right ear. Since then she had not been able to hear well from the right hear.  (07 Jan 2024 15:28) Her Blood alcohol level was 135 during this ED visit.    During this hospital course, patient had incidental  ischemic  infarct located in left parietal region as seen on MRI.   The stroke etiology is likely secondary to:  []atrial fibrillation  []small vessel disease from atherosclerotic risk factors  [x]other: ESUS    This  infarct doesnt explain her symptoms which are likely due to peripheral vertigo, she f/u with ENT.    Patient had the following workup done in house:  CT Head: neg for acute pathology  MR Head Non Contrast: Left parietal cortical acute lacunar infarct.  CT Angio Head and neck: No large vessel occlusion, high-grade stenosis, aneurysm,   or vascular malformation.  CTP: no mismatch  []echo TTE  []labs   []other    EP was consulted for ILR but they cant do it bc pt is covid positive (asymptomatic), so will done o/p.    Physical exam at discharge:  Neurologic:  -Mental status: Awake, alert, oriented to person, place, and time. Speech is fluent with intact naming, repetition, and comprehension, no dysarthria.  Follows commands.   -Cranial nerves:   II: Visual fields are full to confrontation.  III, IV, VI: Extraocular movements are intact without nystagmus. Pupils equally round and reactive to light  V:  Facial sensation V1-V3 equal and intact   VII: Left lower facial asymmetric compare to right  VIII: decrease hearing right to finger rub compare to left. Right EAC mild cerumen w/ dried blood on intact TM. Left EAC w/ bruising   IX, X: Uvula is midline and soft palate rises symmetrically  XI: Head turning and shoulder shrug are intact.  XII: Tongue protrudes midline  Motor: Normal bulk and tone. No pronator drift. Strength bilateral upper extremity 5/5, bilateral lower extremities 5/5. Mild postural and kinetic tremor worst in right compare to left  Sensation: Intact to light touch and vibration bilaterally.   Coordination: No dysmetria on finger-to-nose bilaterally  Reflexes: Downgoing toes bilaterally, 2+ b/l biceps, triceps, brachioradialis, patellar and achilles. LLE mild sustained clonus   Gait: Narrow gait and  mildly unsteady. Romberg Neg    NIHSS: 1 for Left lower facial asymmetry  mRS during discharge: 1    New medications on discharge: DAPT 21 days then asa alone.  Further outpatient workup: needs to get ILR o/p EP follow up. Stroke clinic f/u. ENT f/u    stable for dc today.   Hospital course:  Ms. Montoya is a 84 y/o woman with PMH of CAD S/P PCI X3 2007, 2009, 2015 (on ASA), HTN, GERD, Anmol's esophagus, ovarian cancer s/p DAVIS/BSO 44 y ago, s/p TAVR in 2022 and chronic vertigo presented to the ED for dizziness, unsteady gait. LKW 6PM on 1/6.   She reported having lunch after [employer] w/ friends in which she had consumed a few glass of wine. Around 6pm when she got home she started to feel dizzy. She contacted her daughter about her symptoms when she was noted to have a left facial droop and weakness of the left body. She was then take to the ED.     She also reported symptoms of vertigo 11/2023 and was seen he doctor who administered steroid injections to her right ear. Since then she had not been able to hear well from the right hear.  (07 Jan 2024 15:28) Her Blood alcohol level was 135 during this ED visit.    During this hospital course, patient had incidental  ischemic  infarct located in left parietal region as seen on MRI.   The stroke etiology is likely secondary to:  []atrial fibrillation  []small vessel disease from atherosclerotic risk factors  [x]other: ESUS    This  infarct doesnt explain her symptoms which are likely due to peripheral vertigo, she f/u with ENT.    Patient had the following workup done in house:  CT Head: neg for acute pathology  MR Head Non Contrast: Left parietal cortical acute lacunar infarct.  CT Angio Head and neck: No large vessel occlusion, high-grade stenosis, aneurysm,   or vascular malformation.  CTP: no mismatch  []echo TTE  []labs   []other    EP was consulted for ILR but they cant do it bc pt is covid positive (asymptomatic), so will done o/p.    Physical exam at discharge:  Neurologic:  -Mental status: Awake, alert, oriented to person, place, and time. Speech is fluent with intact naming, repetition, and comprehension, no dysarthria.  Follows commands.   -Cranial nerves:   II: Visual fields are full to confrontation.  III, IV, VI: Extraocular movements are intact without nystagmus. Pupils equally round and reactive to light  V:  Facial sensation V1-V3 equal and intact   VII: Left lower facial asymmetric compare to right  VIII: decrease hearing right to finger rub compare to left. Right EAC mild cerumen w/ dried blood on intact TM. Left EAC w/ bruising   IX, X: Uvula is midline and soft palate rises symmetrically  XI: Head turning and shoulder shrug are intact.  XII: Tongue protrudes midline  Motor: Normal bulk and tone. No pronator drift. Strength bilateral upper extremity 5/5, bilateral lower extremities 5/5. Mild postural and kinetic tremor worst in right compare to left  Sensation: Intact to light touch and vibration bilaterally.   Coordination: No dysmetria on finger-to-nose bilaterally  Reflexes: Downgoing toes bilaterally, 2+ b/l biceps, triceps, brachioradialis, patellar and achilles. LLE mild sustained clonus   Gait: Narrow gait and  mildly unsteady. Romberg Neg    NIHSS: 1 for Left lower facial asymmetry  mRS during discharge: 1    New medications on discharge: DAPT 21 days then asa alone.  Further outpatient workup: needs to get ILR o/p EP follow up. Stroke clinic f/u. ENT f/u    stable for dc today.   Hospital course:  Ms. Montoya is a 82 y/o woman with PMH of CAD S/P PCI X3 2007, 2009, 2015 (on ASA), HTN, GERD, Anmol's esophagus, ovarian cancer s/p DAVIS/BSO 44 y ago, s/p TAVR in 2022 and chronic vertigo presented to the ED for dizziness, unsteady gait. LKW 6PM on 1/6.   She reported having lunch after [employer] w/ friends in which she had consumed a few glass of wine. Around 6pm when she got home she started to feel dizzy. She contacted her daughter about her symptoms when she was noted to have a left facial droop and weakness of the left body. She was then take to the ED.     She also reported symptoms of vertigo 11/2023 and was seen he doctor who administered steroid injections to her right ear. Since then she had not been able to hear well from the right hear.  (07 Jan 2024 15:28) Her Blood alcohol level was 135 during this ED visit.    During this hospital course, patient had incidental  ischemic  infarct located in left parietal region as seen on MRI.   The stroke etiology is likely secondary to:  []atrial fibrillation  []small vessel disease from atherosclerotic risk factors  [x]other: ESUS    This  infarct doesnt explain her symptoms which are likely due to peripheral vertigo, she f/u with ENT.    Patient had the following workup done in house:  CT Head: neg for acute pathology  MR Head Non Contrast: Left parietal cortical acute lacunar infarct.  CT Angio Head and neck: No large vessel occlusion, high-grade stenosis, aneurysm,   or vascular malformation.  CTP: no mismatch  []echo TTE EF of 61 %.    EP was consulted for ILR but they cant do it bc pt is covid positive (asymptomatic), so will done o/p.    Physical exam at discharge:  Neurologic:  -Mental status: Awake, alert, oriented to person, place, and time. Speech is fluent with intact naming, repetition, and comprehension, no dysarthria.  Follows commands.   -Cranial nerves:   II: Visual fields are full to confrontation.  III, IV, VI: Extraocular movements are intact without nystagmus. Pupils equally round and reactive to light  V:  Facial sensation V1-V3 equal and intact   VII: Left lower facial asymmetric compare to right  VIII: decrease hearing right to finger rub compare to left. Right EAC mild cerumen w/ dried blood on intact TM. Left EAC w/ bruising   IX, X: Uvula is midline and soft palate rises symmetrically  XI: Head turning and shoulder shrug are intact.  XII: Tongue protrudes midline  Motor: Normal bulk and tone. No pronator drift. Strength bilateral upper extremity 5/5, bilateral lower extremities 5/5. Mild postural and kinetic tremor worst in right compare to left  Sensation: Intact to light touch and vibration bilaterally.   Coordination: No dysmetria on finger-to-nose bilaterally  Reflexes: Downgoing toes bilaterally, 2+ b/l biceps, triceps, brachioradialis, patellar and achilles. LLE mild sustained clonus   Gait: Narrow gait and  mildly unsteady. Romberg Neg    NIHSS: 1 for Left lower facial asymmetry  mRS during discharge: 1    New medications on discharge: DAPT 21 days then asa alone.  Further outpatient workup: needs to get ILR o/p EP follow up. Stroke clinic f/u. ENT f/u    stable for dc today.   Hospital course:  Ms. Montoya is a 84 y/o woman with PMH of CAD S/P PCI X3 2007, 2009, 2015 (on ASA), HTN, GERD, Anmol's esophagus, ovarian cancer s/p DAVIS/BSO 44 y ago, s/p TAVR in 2022 and chronic vertigo presented to the ED for dizziness, unsteady gait. LKW 6PM on 1/6.   She reported having lunch after [employer] w/ friends in which she had consumed a few glass of wine. Around 6pm when she got home she started to feel dizzy. She contacted her daughter about her symptoms when she was noted to have a left facial droop and weakness of the left body. She was then take to the ED.     She also reported symptoms of vertigo 11/2023 and was seen he doctor who administered steroid injections to her right ear. Since then she had not been able to hear well from the right hear.  (07 Jan 2024 15:28) Her Blood alcohol level was 135 during this ED visit.    During this hospital course, patient had incidental  ischemic  infarct located in left parietal region as seen on MRI.   The stroke etiology is likely secondary to:  []atrial fibrillation  []small vessel disease from atherosclerotic risk factors  [x]other: ESUS    This  infarct doesnt explain her symptoms which are likely due to peripheral vertigo, she f/u with ENT.    Patient had the following workup done in house:  CT Head: neg for acute pathology  MR Head Non Contrast: Left parietal cortical acute lacunar infarct.  CT Angio Head and neck: No large vessel occlusion, high-grade stenosis, aneurysm,   or vascular malformation.  CTP: no mismatch  []echo TTE EF of 61 %.    EP was consulted for ILR but they cant do it bc pt is covid positive (asymptomatic), so will done o/p.    Physical exam at discharge:  Neurologic:  -Mental status: Awake, alert, oriented to person, place, and time. Speech is fluent with intact naming, repetition, and comprehension, no dysarthria.  Follows commands.   -Cranial nerves:   II: Visual fields are full to confrontation.  III, IV, VI: Extraocular movements are intact without nystagmus. Pupils equally round and reactive to light  V:  Facial sensation V1-V3 equal and intact   VII: Left lower facial asymmetric compare to right  VIII: decrease hearing right to finger rub compare to left. Right EAC mild cerumen w/ dried blood on intact TM. Left EAC w/ bruising   IX, X: Uvula is midline and soft palate rises symmetrically  XI: Head turning and shoulder shrug are intact.  XII: Tongue protrudes midline  Motor: Normal bulk and tone. No pronator drift. Strength bilateral upper extremity 5/5, bilateral lower extremities 5/5. Mild postural and kinetic tremor worst in right compare to left  Sensation: Intact to light touch and vibration bilaterally.   Coordination: No dysmetria on finger-to-nose bilaterally  Reflexes: Downgoing toes bilaterally, 2+ b/l biceps, triceps, brachioradialis, patellar and achilles. LLE mild sustained clonus   Gait: Narrow gait and  mildly unsteady. Romberg Neg    NIHSS: 1 for Left lower facial asymmetry  mRS during discharge: 1    New medications on discharge: DAPT 21 days then asa alone.  Further outpatient workup: needs to get ILR o/p EP follow up. Stroke clinic f/u. ENT f/u    stable for dc today.   Hospital course:  Ms. Montoya is a 84 y/o woman with PMH of CAD S/P PCI X3 2007, 2009, 2015 (on ASA), HTN, GERD, Anmol's esophagus, ovarian cancer s/p DAVIS/BSO 44 y ago, s/p TAVR in 2022 and chronic vertigo presented to the ED for dizziness, unsteady gait. LKW 6PM on 1/6.   She reported having lunch after [employer] w/ friends in which she had consumed a few glass of wine. Around 6pm when she got home she started to feel dizzy. She contacted her daughter about her symptoms when she was noted to have a left facial droop and weakness of the left body. She was then take to the ED.     She also reported symptoms of vertigo 11/2023 and was seen he doctor who administered steroid injections to her right ear. Since then she had not been able to hear well from the right hear.  (07 Jan 2024 15:28) Her Blood alcohol level was 135 during this ED visit.    During this hospital course, patient had incidental  ischemic  infarct located in left parietal region as seen on MRI.   The stroke etiology is likely secondary to:  []atrial fibrillation  []small vessel disease from atherosclerotic risk factors  [x]other: ESUS    This  infarct doesnt explain her symptoms which are likely due to peripheral vertigo, she f/u with ENT.    Patient had the following workup done in house:  CT Head: neg for acute pathology  MR Head Non Contrast: Left parietal cortical acute lacunar infarct.  CT Angio Head and neck: No large vessel occlusion, high-grade stenosis, aneurysm,   or vascular malformation.  CTP: no mismatch  []echo TTE EF of 61 %.    EP was consulted for ILR but they cant do it bc pt is covid positive (asymptomatic), so will done o/p.    Physical exam at discharge:  Neurologic:  -Mental status: Awake, alert, oriented to person, place, and time. Speech is fluent with intact naming, repetition, and comprehension, no dysarthria.  Follows commands.   -Cranial nerves:   II: Visual fields are full to confrontation.  III, IV, VI: Extraocular movements are intact without nystagmus. Pupils equally round and reactive to light  V:  Facial sensation V1-V3 equal and intact   VII: Left lower facial asymmetric compare to right  VIII: decrease hearing right to finger rub compare to left. Right EAC mild cerumen w/ dried blood on intact TM. Left EAC w/ bruising   IX, X: Uvula is midline and soft palate rises symmetrically  XI: Head turning and shoulder shrug are intact.  XII: Tongue protrudes midline  Motor: Normal bulk and tone. No pronator drift. Strength bilateral upper extremity 5/5, bilateral lower extremities 5/5. Mild postural and kinetic tremor worst in right compare to left  Sensation: Intact to light touch and vibration bilaterally.   Coordination: No dysmetria on finger-to-nose bilaterally  Reflexes: Downgoing toes bilaterally, 2+ b/l biceps, triceps, brachioradialis, patellar and achilles. LLE mild sustained clonus   Gait: Narrow gait and  mildly unsteady. Romberg Neg    NIHSS: 1 for Left lower facial asymmetry  mRS during discharge: 1    New medications on discharge: DAPT 21 days then asa alone.  Further outpatient workup: needs to get ILR o/p EP follow up. Stroke clinic f/u. ENT f/u    stable for dc today.

## 2024-01-08 NOTE — ED ADULT NURSE NOTE - NSICDXPASTMEDICALHX_GEN_ALL_CORE_FT
PAST MEDICAL HISTORY:  Anxiety     CAD (coronary artery disease)     GERD (gastroesophageal reflux disease)     Hypertension     Vertigo

## 2024-01-08 NOTE — SWALLOW BEDSIDE ASSESSMENT ADULT - SLP GENERAL OBSERVATIONS
pt received in ED stretcher asleep arousable w/o c/o pain. +room air +speech clear and fluent; +pt and RN report pt ate breakfast this AM w/o any difficulty.

## 2024-01-08 NOTE — DISCHARGE NOTE PROVIDER - NSDCCPCAREPLAN_GEN_ALL_CORE_FT
PRINCIPAL DISCHARGE DIAGNOSIS  Diagnosis: Cerebral infarction, acute  Assessment and Plan of Treatment: You came in dizziness and unsteady gait. Your MRI showed incidental stroke on the left side of the brain which doesnt explain your symptoms. Please follow up with the cardiologist for heart monitoring device called loop recorder. Follow up in stroke clinic, ENT doctor and your PCP. Avoid excessive alcohol use.      SECONDARY DISCHARGE DIAGNOSES  Diagnosis: COVID  Assessment and Plan of Treatment:

## 2024-01-08 NOTE — PROGRESS NOTE ADULT - ATTENDING COMMENTS
83 year old woman w/ CAD, ASA, HTN, ovarian ca, TAVR, presented w/ transient vertigo now resolved. COVID positive. Incidental L. FP infarction.     Normal neurological exam     CTH neg  CTA h/n neg  MR brain punctate L. FP infarction.   A1c 5.6  LDL 79    Imp: Incidental L. FP infarction. Mechanism ESUS.     Plan:   ASA 81mg + Plavix 75mg for 3w followed by ASA 81mg indefinitely   Statin   TTE  ILR (can be done outpatient)   Gradual normotension   PT     70 minutes spent on total encounter. The necessity of the time spent during the encounter on this date of service was due to:     Review of imaging and chart; obtaining history; examination of pt; discussion and coordination of care, and discussion of lifestyle modification and risk factor control.
Pt is a 82 yo F with PMHx of CAD s/p PCI on ASA, HTN, GERD, ovarian cancer, s/p TAVR, who presented with transient dizziness (milder to episode of vertigo in 11/2023). Denies any focal deficits. NIHSS 0. Covid positive, asymptomatic.     Impr: punctate incidental acute ischemic stroke in left frontoparietal region  Etiology concerning for ESUS  CTA head/neck without significant flow limiting stenosis  Admit to for further stroke w/u  TTE, lipid profile/A1c  PTA: ASA-> DAPT and high intensity statin  Likely EP consult for ILR placement  PT/OT/ST, tele, -180, q4 neurochecks

## 2024-01-08 NOTE — DISCHARGE NOTE PROVIDER - NSDCFUSCHEDAPPT_GEN_ALL_CORE_FT
Andriy Lux  [facility] Physician [facility]  CARDIOLOGY [address] [facility]  Scheduled Appointment: 03/29/2024     Andriy Lux  [facility] Physician [facility]  CARDIOLOGY [address] [facility]  Scheduled Appointment: 03/29/2024     Andriy Lux  [facility] Physician [facility]  CARDIOLOGY [address] [facility]  Scheduled Appointment: 03/29/2024

## 2024-01-08 NOTE — OCCUPATIONAL THERAPY INITIAL EVALUATION ADULT - NSOTDISCHREC_GEN_A_CORE
Pt requires supervision with ADLS. OT recommends d/c to home when medically stable. Refer to IE for details.

## 2024-01-08 NOTE — PHYSICAL THERAPY INITIAL EVALUATION ADULT - NSACTIVITYREC_GEN_A_PT
Pt is independent/supervision for functional mobility.  Recommend pt remain on PT program during hospital stay for observation, due to pts medical status

## 2024-01-08 NOTE — DISCHARGE NOTE PROVIDER - NPI NUMBER (FOR SYSADMIN USE ONLY) :
[[id number]] [[id number]],[[id number]],[[id number]] [[id number]],[[id number]],[[id number]]

## 2024-01-08 NOTE — SWALLOW BEDSIDE ASSESSMENT ADULT - SLP PERTINENT HISTORY OF CURRENT PROBLEM
Pt is an 82 y/o F w/ PMHx: CAD s/p PCI x3 2007, 2009, 2015 (on ASA), HTN, GERD, Espino's esophagus, ovarian ca s/p DAVIS/BSO 44 years ago, s/p TAVR in 2022 and chronic vertigo, presented to the ED for dizziness, unsteady gait. +Stroke code, MRI-> Left parietal cortical acute lacunar infarct. COVID+.

## 2024-01-08 NOTE — DISCHARGE NOTE PROVIDER - PROVIDER TOKENS
PROVIDER:[TOKEN:[[id number]]],PROVIDER:[TOKEN:[[id number]]],PROVIDER:[TOKEN:[[id number]]] PROVIDER:[TOKEN:[[id number]]],PROVIDER:[TOKEN:[[id number]]],PROVIDER:[TOKEN:[[id number]]] PROVIDER:[TOKEN:[[id number]]],PROVIDER:[TOKEN:[[id number]]],PROVIDER:[TOKEN:[[id number]]]

## 2024-01-08 NOTE — CONSULT NOTE ADULT - ASSESSMENT
IMPRESSION: Rehab of stroke / CAD s/p PCI on ASA, HTN, GERD, ovarian cancer, s/p TAVR    PRECAUTIONS: [   ] Cardiac  [   ] Respiratory  [   ] Seizures [   ] Contact Isolation  [   ] Droplet Isolation  [   ] Other    Weight Bearing Status:     RECOMMENDATION:    Out of Bed to Chair     DVT/Decubiti Prophylaxis    REHAB PLAN:     [  x  ] Bedside P/T 3-5 times a week   [  x  ]   Bedside O/T  2-3 times a week             [    ] Speech Therapy               [    ]  No Rehab Therapy Indicated   Conditioning/ROM                                    ADL  Bed Mobility                                               Conditioning/ROM  Transfers                                                     Bed Mobility  Sitting /Standing Balance                         Transfers                                        Gait Training                                               Sitting/Standing Balance  Stair Training [   ]Applicable                    Home equipment Eval                                                                        Splinting  [   ] Only      GOALS:   ADL   [ x   ]   Independent                    Transfers  [  x  ] Independent                          Ambulation  [  x  ] Independent     [  x   ] With device                            [    ]  CG                                                         [    ]  CG                                                                  [    ] CG                            [    ] Min A                                                   [    ] Min A                                                              [    ] Min  A          DISCHARGE PLAN:   [    ]  Good candidate for Intensive Rehabilitation/Hospital based                                             Will tolerate 3hrs Intensive Rehab Daily                                       [     ]  Short Term Rehab in Skilled Nursing Facility                                       [ x    ]  Home with Outpatient or VN services                                         [     ]  Possible Candidate for Intensive Hospital based Rehab

## 2024-01-08 NOTE — PROGRESS NOTE ADULT - ASSESSMENT
Pt is a 82 yo F with PMHx of CAD s/p PCI on ASA, HTN, GERD, ovarian cancer, s/p TAVR, who presented with transient dizziness (milder to episode of vertigo in 2023) and left facial. NIHSS 1. Covid positive, asymptomatic. Elevated BAL. MRI w/ incidental left FP punctate infarct. Will admit to Neurovascular service for complete stroke w/u and secondary prevention.     #Incidental Left Frontoparietal acute ischemic punctate infarct; Eitiology likely ESUS  #s/p Clopidogrel Load   - NIHSS and Vitals Q8  - c/w Clopidogrel 75mg QD and ASA 81mg QD  - Start Atorvastatin 80mg QD  - f/u TTE  - A1c 5.6, TSH 1.02 . LDL 79  - PT rec o/p PT  - EP consulted for ILR but cant place it now as pt has COVID, outpatient ILR    #h/o HTN  - SBP goal 120-180mmHg     #EtOH use d/o   #  #CIWA protocol     #COVID pos  isolation precaution  maintain SPO2 > 92%    DVT ppx : Lovenox 40mg SC Q24  GI ppx: h/o GERD - Pantoprazole 40mg QD  Diet: DASH/TLC   Activity: o/p PT  Dispo: home    pendin) TTE

## 2024-01-09 ENCOUNTER — TRANSCRIPTION ENCOUNTER (OUTPATIENT)
Age: 84
End: 2024-01-09

## 2024-01-09 VITALS
HEART RATE: 86 BPM | OXYGEN SATURATION: 98 % | SYSTOLIC BLOOD PRESSURE: 127 MMHG | RESPIRATION RATE: 18 BRPM | TEMPERATURE: 99 F | DIASTOLIC BLOOD PRESSURE: 83 MMHG

## 2024-01-09 PROBLEM — R42 DIZZINESS AND GIDDINESS: Chronic | Status: ACTIVE | Noted: 2024-01-07

## 2024-01-09 PROCEDURE — 99239 HOSP IP/OBS DSCHRG MGMT >30: CPT

## 2024-01-09 RX ORDER — CHLORHEXIDINE GLUCONATE 213 G/1000ML
1 SOLUTION TOPICAL ONCE
Refills: 0 | Status: DISCONTINUED | OUTPATIENT
Start: 2024-01-09 | End: 2024-01-09

## 2024-01-09 RX ADMIN — PANTOPRAZOLE SODIUM 40 MILLIGRAM(S): 20 TABLET, DELAYED RELEASE ORAL at 05:35

## 2024-01-09 NOTE — PROGRESS NOTE ADULT - SUBJECTIVE AND OBJECTIVE BOX
Neurology Stroke Progress Note    INTERVAL HPI/OVERNIGHT EVENTS:  Patient seen and examined. TTE done yesterday. Was awaiting results. Pt was moved from ED to CEU last night. No events overnight.    MEDICATIONS  (STANDING):  aspirin  chewable 81 milliGRAM(s) Oral daily  atorvastatin 80 milliGRAM(s) Oral at bedtime  chlorhexidine 2% Cloths 1 Application(s) Topical once  clopidogrel Tablet 75 milliGRAM(s) Oral daily  enoxaparin Injectable 40 milliGRAM(s) SubCutaneous every 24 hours  melatonin 3 milliGRAM(s) Oral at bedtime  pantoprazole    Tablet 40 milliGRAM(s) Oral before breakfast    MEDICATIONS  (PRN):  LORazepam     Tablet 0.5 milliGRAM(s) Oral every 2 hours PRN CIWA-Ar score increase by 2 points and a total score of 7 or less    Allergies    No Known Allergies    Intolerances      Vital Signs Last 24 Hrs  T(C): 37.1 (09 Jan 2024 04:00), Max: 37.1 (09 Jan 2024 00:00)  T(F): 98.8 (09 Jan 2024 04:00), Max: 98.8 (09 Jan 2024 00:00)  HR: 72 (09 Jan 2024 04:00) (70 - 103)  BP: 101/59 (09 Jan 2024 04:00) (101/59 - 170/85)  BP(mean): --  RR: 18 (09 Jan 2024 04:00) (18 - 18)  SpO2: 95% (09 Jan 2024 04:00) (95% - 98%)    Parameters below as of 08 Jan 2024 19:45  Patient On (Oxygen Delivery Method): room air      Physical exam at discharge:  Neurologic:  -Mental status: Awake, alert, oriented to person, place, and time. Speech is fluent with intact naming, repetition, and comprehension, no dysarthria.  Follows commands.   -Cranial nerves:   II: Visual fields are full to confrontation.  III, IV, VI: Extraocular movements are intact without nystagmus. Pupils equally round and reactive to light  V:  Facial sensation V1-V3 equal and intact   VII: Left lower facial asymmetric compare to right  VIII: decrease hearing right to finger rub compare to left. Right EAC mild cerumen w/ dried blood on intact TM. Left EAC w/ bruising   IX, X: Uvula is midline and soft palate rises symmetrically  XI: Head turning and shoulder shrug are intact.  XII: Tongue protrudes midline  Motor: Normal bulk and tone. No pronator drift. Strength bilateral upper extremity 5/5, bilateral lower extremities 5/5. Mild postural and kinetic tremor worst in right compare to left  Sensation: Intact to light touch and vibration bilaterally.   Coordination: No dysmetria on finger-to-nose bilaterally  Reflexes: Downgoing toes bilaterally, 2+ b/l biceps, triceps, brachioradialis, patellar and achilles. LLE mild sustained clonus   Gait: Narrow gait and  mildly unsteady. Romberg Neg    NIHSS: 1 for Left lower facial asymmetry    LABS:                        13.8   4.93  )-----------( 224      ( 08 Jan 2024 07:32 )             40.8     01-08    137  |  101  |  16  ----------------------------<  81  3.2<L>   |  24  |  0.7    Ca    9.0      08 Jan 2024 07:32  Phos  3.7     01-08  Mg     2.2     01-08    TPro  7.0  /  Alb  4.5  /  TBili  0.9  /  DBili  0.2  /  AST  17  /  ALT  9   /  AlkPhos  85  01-07      Urinalysis Basic - ( 08 Jan 2024 07:32 )    Color: x / Appearance: x / SG: x / pH: x  Gluc: 81 mg/dL / Ketone: x  / Bili: x / Urobili: x   Blood: x / Protein: x / Nitrite: x   Leuk Esterase: x / RBC: x / WBC x   Sq Epi: x / Non Sq Epi: x / Bacteria: x        RADIOLOGY & ADDITIONAL TESTS: Reviewed

## 2024-01-09 NOTE — CHART NOTE - NSCHARTNOTEFT_GEN_A_CORE
Pt was seen and examined at bedside today, no complaints, pt is ambulating, admitted to neuro service for suspected CVA vs TIA, all symptoms resolved.   Medicine called for co management, pt has no active medical issues and getting discharged today

## 2024-01-09 NOTE — PROGRESS NOTE ADULT - ASSESSMENT
Pt is a 84 yo F with PMHx of CAD s/p PCI on ASA, HTN, GERD, ovarian cancer, s/p TAVR, who presented with transient dizziness (milder to episode of vertigo in 11/2023) and left facial. NIHSS 1. Covid positive, asymptomatic. Elevated BAL. MRI w/ incidental left FP punctate infarct. Will admit to Neurovascular service for complete stroke w/u and secondary prevention.     #Incidental Left Frontoparietal acute ischemic punctate infarct; Eitiology likely ESUS  #s/p Clopidogrel Load   - NIHSS and Vitals Q8  - c/w Clopidogrel 75mg QD and ASA 81mg QD  - Start Atorvastatin 80mg QD  - TTE EF 61%  - A1c 5.6, TSH 1.02 . LDL 79  - PT rec o/p PT  - EP consulted for ILR but cant place it now as pt has COVID, outpatient ILR    #h/o HTN  - SBP goal 120-180mmHg     #EtOH use d/o   #  #CIWA protocol     #COVID pos  isolation precaution  maintain SPO2 > 92%    DVT ppx : Lovenox 40mg SC Q24  GI ppx: h/o GERD - Pantoprazole 40mg QD  Diet: DASH/TLC   Activity: o/p PT  Dispo: home    ddc today   Pt is a 82 yo F with PMHx of CAD s/p PCI on ASA, HTN, GERD, ovarian cancer, s/p TAVR, who presented with transient dizziness (milder to episode of vertigo in 11/2023) and left facial. NIHSS 1. Covid positive, asymptomatic. Elevated BAL. MRI w/ incidental left FP punctate infarct. Will admit to Neurovascular service for complete stroke w/u and secondary prevention.     #Incidental Left Frontoparietal acute ischemic punctate infarct; Eitiology likely ESUS  #s/p Clopidogrel Load   - NIHSS and Vitals Q8  - c/w Clopidogrel 75mg QD and ASA 81mg QD  - Start Atorvastatin 80mg QD  - TTE EF 61%  - A1c 5.6, TSH 1.02 . LDL 79  - PT rec o/p PT  - EP consulted for ILR but cant place it now as pt has COVID, outpatient ILR    #h/o HTN  - SBP goal 120-180mmHg     #EtOH use d/o   #  #CIWA protocol     #COVID pos  isolation precaution  maintain SPO2 > 92%    DVT ppx : Lovenox 40mg SC Q24  GI ppx: h/o GERD - Pantoprazole 40mg QD  Diet: DASH/TLC   Activity: o/p PT  Dispo: home    ddc today

## 2024-01-09 NOTE — DISCHARGE NOTE NURSING/CASE MANAGEMENT/SOCIAL WORK - PATIENT PORTAL LINK FT
You can access the FollowMyHealth Patient Portal offered by [facility] by registering at the following website: [email or web address]. By joining [employer]’s FollowMyHealth portal, you will also be able to view your health information using other applications (apps) compatible with our system. You can access the FollowMyHealth Patient Portal offered by [facility] by registering at the following website: [email or web address]. By joining [employer]’s FollowMyHealth portal, you will also be able to view your health information using other applications (apps) compatible with our system.

## 2024-01-09 NOTE — DISCHARGE NOTE NURSING/CASE MANAGEMENT/SOCIAL WORK - NSDCPEFALRISK_GEN_ALL_CORE
For information on Fall & Injury Prevention, visit: [email or web address] OR  [email or web address] OR  https://www.cdc.gov/steadi/patient.html For information on Fall & Injury Prevention, visit: [email or web address] OR  [email or web address] OR  https://www.cdc.gov/steadi/patient.html

## 2024-01-09 NOTE — PATIENT PROFILE ADULT - NSPROEXTENSIONSOFSELF_GEN_A_NUR
Gen: No fever, dec appetite  Eyes: No eye irritation or discharge  ENT: No ear pain, congestion, sore throat  Resp: +cough, difficulty breathing  Cardiovascular: No chest pain or palpitation  Gastroenteric: No nausea/vomiting, diarrhea, constipation  :  No change in urine output; no dysuria  MS: No joint or muscle pain  Skin: No rashes  Neuro: No headache; no abnormal movements  Remainder negative, except as per the HPI cane

## 2024-01-16 DIAGNOSIS — R29.701 NIHSS SCORE 1: ICD-10-CM

## 2024-01-16 DIAGNOSIS — Z90.710 ACQUIRED ABSENCE OF BOTH CERVIX AND UTERUS: ICD-10-CM

## 2024-01-16 DIAGNOSIS — Z90.722 ACQUIRED ABSENCE OF OVARIES, BILATERAL: ICD-10-CM

## 2024-01-16 DIAGNOSIS — Z96.651 PRESENCE OF RIGHT ARTIFICIAL KNEE JOINT: ICD-10-CM

## 2024-01-16 DIAGNOSIS — K21.9 GASTRO-ESOPHAGEAL REFLUX DISEASE WITHOUT ESOPHAGITIS: ICD-10-CM

## 2024-01-16 DIAGNOSIS — I10 ESSENTIAL (PRIMARY) HYPERTENSION: ICD-10-CM

## 2024-01-16 DIAGNOSIS — F41.9 ANXIETY DISORDER, UNSPECIFIED: ICD-10-CM

## 2024-01-16 DIAGNOSIS — Z95.2 PRESENCE OF PROSTHETIC HEART VALVE: ICD-10-CM

## 2024-01-16 DIAGNOSIS — I25.10 ATHEROSCLEROTIC HEART DISEASE OF NATIVE CORONARY ARTERY WITHOUT ANGINA PECTORIS: ICD-10-CM

## 2024-01-16 DIAGNOSIS — I63.81 OTHER CEREBRAL INFARCTION DUE TO OCCLUSION OR STENOSIS OF SMALL ARTERY: ICD-10-CM

## 2024-01-16 DIAGNOSIS — U07.1 COVID-19: ICD-10-CM

## 2024-01-16 DIAGNOSIS — R29.810 FACIAL WEAKNESS: ICD-10-CM

## 2024-01-16 DIAGNOSIS — Z79.82 LONG TERM (CURRENT) USE OF ASPIRIN: ICD-10-CM
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
## 2024-01-16 DIAGNOSIS — F10.90 ALCOHOL USE, UNSPECIFIED, UNCOMPLICATED: ICD-10-CM

## 2024-01-16 DIAGNOSIS — Z95.5 PRESENCE OF CORONARY ANGIOPLASTY IMPLANT AND GRAFT: ICD-10-CM

## 2024-01-16 DIAGNOSIS — Z90.49 ACQUIRED ABSENCE OF OTHER SPECIFIED PARTS OF DIGESTIVE TRACT: ICD-10-CM

## 2024-01-16 DIAGNOSIS — Z85.43 PERSONAL HISTORY OF MALIGNANT NEOPLASM OF OVARY: ICD-10-CM

## 2024-01-16 DIAGNOSIS — R42 DIZZINESS AND GIDDINESS: ICD-10-CM

## 2024-01-22 ENCOUNTER — APPOINTMENT (OUTPATIENT)
Dept: CARDIOLOGY | Facility: CLINIC | Age: 84
End: 2024-01-22
Payer: MEDICARE

## 2024-01-22 VITALS
WEIGHT: 151 LBS | HEART RATE: 73 BPM | DIASTOLIC BLOOD PRESSURE: 78 MMHG | HEIGHT: 64 IN | BODY MASS INDEX: 25.78 KG/M2 | SYSTOLIC BLOOD PRESSURE: 124 MMHG

## 2024-01-22 DIAGNOSIS — I63.9 CEREBRAL INFARCTION, UNSPECIFIED: ICD-10-CM

## 2024-01-22 PROCEDURE — 99214 OFFICE O/P EST MOD 30 MIN: CPT | Mod: 25

## 2024-01-22 PROCEDURE — 93000 ELECTROCARDIOGRAM COMPLETE: CPT

## 2024-01-22 RX ORDER — AMOXICILLIN 500 MG/1
500 TABLET, FILM COATED ORAL
Qty: 4 | Refills: 1 | Status: DISCONTINUED | COMMUNITY
Start: 2023-08-04 | End: 2024-01-22

## 2024-01-22 RX ORDER — POLYETHYLENE GLYCOL 400 AND PROPYLENE GLYCOL 4; 3 MG/ML; MG/ML
SOLUTION/ DROPS OPHTHALMIC
Refills: 0 | Status: DISCONTINUED | COMMUNITY
End: 2024-01-22

## 2024-01-22 NOTE — REASON FOR VISIT
[FreeTextEntry1] : Interval hospitalization.  Acute ischemic lacunar infarct.  Left facial droop and hemiparesis.  Symptoms improved.  Minimal residual deficit.  CTA head and neck unremarkable.  Discharged with [facility].  Has neurology follow-up.  No interval cardiac symptoms.

## 2024-01-22 NOTE — ASSESSMENT
[FreeTextEntry1] : CAD s/p PCI (2007 / 2011). No angina.  Severe AS s/p TAVR nL THV function  Cont Lipitor (prior intolerance)  Symptomatic bradycardia / SSS ?  Occult CVA (possibly embolic).  At risk for atrial fibrillation.

## 2024-01-22 NOTE — DISCUSSION/SUMMARY
[FreeTextEntry1] : Cont ASA and Plavix Cont Lipitor Complete.  ILR as planned if no AF. Follow-up 3 months. [EKG obtained to assist in diagnosis and management of assessed problem(s)] : EKG obtained to assist in diagnosis and management of assessed problem(s)

## 2024-01-22 NOTE — HISTORY OF PRESENT ILLNESS
[FreeTextEntry1] : 79 year-old female presents to [facility] care. Previously followed with Dr. Alford.  Cardiac history: CAD s/p PCI LAD / CX (2007). Repeat PCI 2011 Murmur  Worsening exertional dyspnea / fatigue x several months.  Difficulty with steps.  Breathing comfortable at rest.  Vague chest discomfort.  Somewhat reminiscent of prior angina, but less intense.  Usually exertional, but occasionally at rest.  Nocturnal palpitations in bed.  Mild brief lightheadedness.  No syncope.  Notably, admitted baseline anxiety is worse secondary [place].

## 2024-01-22 NOTE — PHYSICAL EXAM
[de-identified] : well appearing, overweight, no distress [de-identified] : CTA [de-identified] : reg, nL s1/s2, no m/r/g [de-identified] : warm, no edema [de-identified] : alert, normal affect, logical conversation

## 2024-01-25 ENCOUNTER — APPOINTMENT (OUTPATIENT)
Dept: CARDIOLOGY | Facility: CLINIC | Age: 84
End: 2024-01-25

## 2024-02-01 ENCOUNTER — APPOINTMENT (OUTPATIENT)
Dept: OTOLARYNGOLOGY | Facility: CLINIC | Age: 84
End: 2024-02-01
Payer: MEDICARE

## 2024-02-01 PROCEDURE — 99214 OFFICE O/P EST MOD 30 MIN: CPT

## 2024-02-01 PROCEDURE — 92557 COMPREHENSIVE HEARING TEST: CPT

## 2024-02-01 PROCEDURE — 92550 TYMPANOMETRY & REFLEX THRESH: CPT | Mod: 52

## 2024-02-01 NOTE — HISTORY OF PRESENT ILLNESS
[FreeTextEntry1] : Patient returns today c/o head pressure. She was seen in ED on 01/09/24 for stroke and cerebral infraction and covid.  She was seen a week ago by Dr. Chowdary had audiogram performed.  Unable to hear from right ear at all staring in November when she had dizzines. . States that Dr. Chowdary gave her steroid injection in both ears. Right ear feels clogged. Feels pressure from right ear radiating to lower jar. No tinittus, otorrhea, or otalgia.

## 2024-02-01 NOTE — DATA REVIEWED
[de-identified] :  reviewed and interpreted by me. right severe SNHL [de-identified] : Test was reviewed  and interpreted by me. See official report below. MR BRAIN ORDERED BY: NOE RAIZAMAKENZIE  PROCEDURE DATE: 01/07/2024    INTERPRETATION: CLINICAL INDICATION: Slurred speech.  TECHNIQUE: MRI of the brain was performed without the intravenous administration of contrast, according to standard protocol.  COMPARISON: Brain MRI 11/19/2023. Correlation also made with CT head 1/7/2024.  FINDINGS: Punctate focus of restricted diffusion in the cortical left parietal lobe.  No acute intracranial hemorrhage, mass effect, or hydrocephalus. No extra-axial collection. Basal cisterns are patent.  Age-related involutional changes and chronic microvascular ischemic changes.  Ventricles and sulci are age-appropriate in size.  Focal susceptibility artifact at the left cerebellomedullary cistern corresponding to a 1 cm calcification on CT, likely reflecting a calcified meningioma.  Major intracranial flow-voids are preserved.  Bilateral cataract surgery. The orbits, sellar and suprasellar structures, and craniocervical junction are unremarkable. Visualized paranasal sinuses and mastoid air cells are clear.  IMPRESSION: Left parietal cortical acute lacunar infarct.  Findings were discussed with Dr. Ashley at 1/7/2024 8:25 AM.

## 2024-02-01 NOTE — REASON FOR VISIT
[Subsequent Evaluation] : a subsequent evaluation for [FreeTextEntry2] : c/o sudden hearing loss right ear

## 2024-02-02 ENCOUNTER — NON-APPOINTMENT (OUTPATIENT)
Age: 84
End: 2024-02-02

## 2024-02-02 ENCOUNTER — APPOINTMENT (OUTPATIENT)
Dept: NEUROLOGY | Facility: CLINIC | Age: 84
End: 2024-02-02
Payer: MEDICARE

## 2024-02-02 VITALS
HEIGHT: 64 IN | WEIGHT: 152 LBS | BODY MASS INDEX: 25.95 KG/M2 | OXYGEN SATURATION: 98 % | SYSTOLIC BLOOD PRESSURE: 153 MMHG | DIASTOLIC BLOOD PRESSURE: 82 MMHG | HEART RATE: 87 BPM

## 2024-02-02 DIAGNOSIS — Z87.19 PERSONAL HISTORY OF OTHER DISEASES OF THE DIGESTIVE SYSTEM: ICD-10-CM

## 2024-02-02 DIAGNOSIS — Z78.9 OTHER SPECIFIED HEALTH STATUS: ICD-10-CM

## 2024-02-02 DIAGNOSIS — D32.9 BENIGN NEOPLASM OF MENINGES, UNSPECIFIED: ICD-10-CM

## 2024-02-02 PROCEDURE — 99204 OFFICE O/P NEW MOD 45 MIN: CPT

## 2024-02-23 ENCOUNTER — NON-APPOINTMENT (OUTPATIENT)
Age: 84
End: 2024-02-23

## 2024-02-26 NOTE — HISTORY OF PRESENT ILLNESS
[FreeTextEntry1] : Ms. LOPEZ 83 year old female presents today for HFU. Was diagnosed with acute left parietal lacunar infarct. She presented to [facility] with left sided facial droop, and left sided weakness.   Patient describes she initially had vertigo sx. [place] ok, went home, went to [employer], had a glass of wine. Then later on felt like she couldn't lift her left leg. Her family member reports that she called stating she fell, but the patient does not remember falling. Since leaving the hospital feels ok. Says she can't hear out of the right ear, she saw ENT for this and its suspected to have hearing loss. Is being assessed for a hearing aide. Says a few weeks ago got a steroid injection in the ear. She thinks the hearing is worse after hospitalization but hasn't worsened since DC. She says hearing is affecting her balance.  Weakness has improved, occasionally feels forgetful sometimes notices herself saying the wrong word or cant remember the word she wanted to say. No vision loss.  Reports drinking a glass of wine with dinner. She has not drank since DC. ________________________________________________________________________________ [facility] DC NOTE Hospital Course: Discharge Date	09-Jan-2024   Admission Date	07-Jan-2024 10:33 Reason for Admission	Acute ischemic stroke Hospital Course	 Hospital course: Ms. Lopez is a 82 y/o woman with PMH of CAD S/P PCI X3 2007, 2009, 2015 (on ASA), HTN, GERD, Anmol's esophagus, ovarian cancer s/p DAVIS/BSO 44 y ago, s/p TAVR in 2022 and chronic vertigo presented to the ED for dizziness, unsteady gait. LKW 6PM on 1/6. She reported having lunch after [employer] w/ friends in which she had consumed a few glass of wine. Around 6pm when she got home she started to feel dizzy. She contacted her daughter about her symptoms when she was noted to have a left facial droop and weakness of the left body. She was then take to the ED.   She also reported symptoms of vertigo 11/2023 and was seen he doctor who administered steroid injections to her right ear. Since then she had not been able to hear well from the right hear.  (07 Jan 2024 15:28) Her Blood alcohol level was 135 during this ED visit.   During this hospital course, patient had incidental  ischemic  infarct located in left parietal region as seen on MRI. The stroke etiology is likely secondary to: []atrial fibrillation []small vessel disease from atherosclerotic risk factors [x]other: ESUS   This  infarct doesnt explain her symptoms which are likely due to peripheral vertigo, she f/u with ENT.   Patient had the following workup done in house: CT Head: neg for acute pathology MR Head Non Contrast: Left parietal cortical acute lacunar infarct. CT Angio Head and neck: No large vessel occlusion, high-grade stenosis, aneurysm, or vascular malformation. CTP: no mismatch []echo TTE EF of 61 %.   EP was consulted for ILR but they cant do it bc pt is covid positive (asymptomatic), so will done o/p.   Physical exam at discharge: Neurologic: -Mental status: Awake, alert, oriented to person, place, and time. Speech is fluent with intact naming, repetition, and comprehension, no dysarthria. Follows commands. -Cranial nerves: II: Visual fields are full to confrontation. III, IV, VI: Extraocular movements are intact without nystagmus. Pupils equally round and reactive to light V:  Facial sensation V1-V3 equal and intact VII: Left lower facial asymmetric compare to right VIII: decrease hearing right to finger rub compare to left. Right EAC mild cerumen w/ dried blood on intact TM. Left EAC w/ bruising IX, X: Uvula is midline and soft palate rises symmetrically XI: Head turning and shoulder shrug are intact. XII: Tongue protrudes midline Motor: Normal bulk and tone. No pronator drift. Strength bilateral upper extremity 5/5, bilateral lower extremities 5/5. Mild postural and kinetic tremor worst in right compare to left Sensation: Intact to light touch and vibration bilaterally. Coordination: No dysmetria on finger-to-nose bilaterally Reflexes: Downgoing toes bilaterally, 2+ b/l biceps, triceps, brachioradialis, patellar and achilles. LLE mild sustained clonus Gait: Narrow gait and  mildly unsteady. Romberg Neg   NIHSS: 1 for Left lower facial asymmetry mRS during discharge: 1   New medications on discharge: DAPT 21 days then asa alone. Further outpatient workup: needs to get ILR o/p EP follow up. Stroke clinic f/u. ENT f/u   stable for dc today.   Med Reconciliation: Medication Reconciliation Status	Admission Reconciliation is Completed Discharge Reconciliation is Not Complete Discharge Medications	aspirin 81 mg oral tablet, chewable: 1 tab(s) orally once a day atorvastatin 80 mg oral tablet: 1 tab(s) orally once a day (at bedtime) clopidogrel 75 mg oral tablet: 1 tab(s) orally once a day ESCITALOPRAM OXALATE 5MG TABS: TAKE ONE TABLET BY MOUTH EVERY DAY OMEPRAZOLE DR 40 MG CAPSULE: 1 cap(s) orally once a day   . , , Care Plan/Procedures: Discharge Diagnoses, Assessment and Plan of Treatment	PRINCIPAL DISCHARGE DIAGNOSIS Diagnosis: Cerebral infarction, acute Assessment and Plan of Treatment: You came in dizziness and unsteady gait. Your MRI showed incidental stroke on the left side of the brain which doesnt explain your symptoms. Please follow up with the cardiologist for heart monitoring device called loop recorder. Follow up in stroke clinic, ENT doctor and your PCP. Avoid excessive alcohol use.     SECONDARY DISCHARGE DIAGNOSES Diagnosis: COVID Assessment and Plan of Treatment: Goal(s)	To get better and follow your care plan as instructed. Follow Up: Care Providers for Follow up (PCP/Outpatient Provider)	[facility] [address] [place] [address] Phone: [phone] Fax: [phone] Follow Up Time:   [facility] [address] [facility], [address] [place] [address] Phone: [phone] Fax: [phone] Follow Up Time:   [facility] [address] [place] [address] Phone: [phone] Fax: [phone] Follow Up Time: Additional Provider Info (For SysAdmin Use Only)	 PROVIDER:[TOKEN:[[id number]]],PROVIDER:[TOKEN:[[id number]]],PROVIDER :[TOKEN:[[id number]]] Care Providers Direct Addresses (For SYSAdmin Use Only)	 ,DirectAddress_Unknown,[email or web address] NPI number (For SysAdmin Use Only) :	[[id number]] Patient's Scheduled Appointments	[facility] Physician [facility] CARDIOLOGY [address] [place] [address] Scheduled Appointment: 03/29/2024 Discharge Diet	DASH Diet Activity	No heavy lifting/straining Quality Measures: Patient Condition	Stable Hospice Patient	No Core Measure Site	Yes Tobacco Usage Within the Last Year	No Final Modified Maty Score at Discharge	1 - No significant disability. Able to carry out all usual activities, despite some symptoms Rehabilitation Assessment	Assessment performed Does the patient have difficulty running errands alone like visiting a doctors office or shopping?	No Does the patient have difficulty climbing stairs?	No Cognition: The patient has	No difficulties Does the patient have a principal diagnosis of ischemic stroke, hemorrhagic stroke, or TIA?	Yes... Patient's Risk Factors for Stroke	History of a stroke or TIA Does the patient have a principal diagnosis of Acute Myocardial Infarction?	No Has the patient had a Percutaneous Coronary Intervention?	No Anticoagulation Therapy for Atrial Fibrillation/Flutter	No, not prescribed... Reasons for NOT Prescribing Anticoagulation Therapy	Patient does not have atrial fibrillation/flutter Antithrombotic Therapy	Yes Statin Therapy	Yes Did the Patient Present With or was Treated for Malnutrition During This Admission	No Document Complete: Physician Section Complete	This document is complete and the patient is ready for discharge. For questions about your prescriptions, please call:	[phone] Is this contact telephone number correct?	Yes Attending Attestation Statement	I have personally seen and examined the patient. I have collaborated with and supervised the .	on the discharge service for the patient. I have reviewed and made amendments to the documentation where necessary.     Electronic Signatures: Gerardo Griggs)   (Signed 09-Jan-2024 07:27) 	Authored: Hospital Course, Care Plan/Procedures, Follow Up, Quality Measures, Document Complete, Med Reconciliation Sigrid Orozco)   (Signed 15-Heron-2024 19:01) 	Co-Signer: Hospital Course, Care Plan/Procedures, Follow Up, Quality Measures, Document Complete, Med Reconciliation   Last Updated: 15-Heron-2024 19:01 by Sigrid Orozco)

## 2024-02-26 NOTE — ASSESSMENT
[FreeTextEntry1] : Ms. LOPEZ 83 year old female presents today for a CVA follow up from January 2024. Since hospital DC no new neurologic deficits since DC. Unclear if left facial asymmetry is chronic. Reports she has occasional difficulty with word finding, will send for an evaluation with speech therapy. Will also repeat her MRI head w/ iv contrast given meningioma finding.   Plan:  -C/w ENT  -C/w DAPT for now after reviewing cardio note, will reach out to cardiology  -If MCOT negative, plans to do ILR -Labs -Mr head w/ iv contrast -Labs -Speech therapy referral  -REEG for ?fall, patient does not recall the fall, ?LOC -Educated on when to report to ED, and rx factor management -RTC 3-6 months

## 2024-02-26 NOTE — ADDENDUM
[FreeTextEntry1] : D/w Cardiology Dr. Lux. Office was able to reach patient 2/26/24. Will c/w ASA monotherapy, can stop Plavix.

## 2024-02-26 NOTE — PHYSICAL EXAM
[FreeTextEntry1] : Mental status: Awake, alert and oriented x3.  Recent and remote memory intact.  Naming, repetition and comprehension intact.  Attention/concentration intact.  No dysarthria, no aphasia.  Fund of knowledge appropriate.   Cranial nerves: Pupils equally round and reactive to light, visual fields full, no nystagmus, extraocular muscles intact, V1 through V3 intact bilaterally and symmetric, face asymmetric (left droop, unclear if this is chronic per family member), hearing decreased in the R>L, palate elevation symmetric, tongue was midline.  Motor:  MRC grading 5/5 b/l UE/LE.   strength 5/5.  Normal tone and bulk.  No abnormal movements.   Sensation: Intact to light touch, temperature, and vibration.  Coordination: No dysmetria on finger-to-nose.  Reflexes: 2+ in bilateral UE/LE.  Gait: Narrow and steady. No ataxia.

## 2024-02-28 ENCOUNTER — APPOINTMENT (OUTPATIENT)
Dept: OTOLARYNGOLOGY | Facility: CLINIC | Age: 84
End: 2024-02-28

## 2024-03-04 ENCOUNTER — APPOINTMENT (OUTPATIENT)
Dept: ELECTROPHYSIOLOGY | Facility: CLINIC | Age: 84
End: 2024-03-04

## 2024-03-09 ENCOUNTER — RESULT REVIEW (OUTPATIENT)
Age: 84
End: 2024-03-09

## 2024-03-09 ENCOUNTER — OUTPATIENT (OUTPATIENT)
Dept: OUTPATIENT SERVICES | Facility: HOSPITAL | Age: 84
LOS: 1 days | End: 2024-03-09
Payer: MEDICARE

## 2024-03-09 DIAGNOSIS — Z96.651 PRESENCE OF RIGHT ARTIFICIAL KNEE JOINT: Chronic | ICD-10-CM

## 2024-03-09 DIAGNOSIS — Z00.8 ENCOUNTER FOR OTHER GENERAL EXAMINATION: ICD-10-CM

## 2024-03-09 DIAGNOSIS — H90.3 SENSORINEURAL HEARING LOSS, BILATERAL: ICD-10-CM

## 2024-03-09 DIAGNOSIS — Z90.49 ACQUIRED ABSENCE OF OTHER SPECIFIED PARTS OF DIGESTIVE TRACT: Chronic | ICD-10-CM

## 2024-03-09 DIAGNOSIS — Z90.710 ACQUIRED ABSENCE OF BOTH CERVIX AND UTERUS: Chronic | ICD-10-CM

## 2024-03-09 PROCEDURE — A9579: CPT

## 2024-03-09 PROCEDURE — 70553 MRI BRAIN STEM W/O & W/DYE: CPT | Mod: 26

## 2024-03-09 PROCEDURE — 70553 MRI BRAIN STEM W/O & W/DYE: CPT

## 2024-03-10 DIAGNOSIS — H90.3 SENSORINEURAL HEARING LOSS, BILATERAL: ICD-10-CM

## 2024-03-13 ENCOUNTER — APPOINTMENT (OUTPATIENT)
Dept: ELECTROPHYSIOLOGY | Facility: CLINIC | Age: 84
End: 2024-03-13

## 2024-03-27 ENCOUNTER — APPOINTMENT (OUTPATIENT)
Dept: OTOLARYNGOLOGY | Facility: CLINIC | Age: 84
End: 2024-03-27
Payer: MEDICARE

## 2024-03-27 VITALS — HEIGHT: 64 IN | BODY MASS INDEX: 25.95 KG/M2 | WEIGHT: 152 LBS

## 2024-03-27 DIAGNOSIS — H91.21 SUDDEN IDIOPATHIC HEARING LOSS, RIGHT EAR: ICD-10-CM

## 2024-03-27 DIAGNOSIS — H90.3 SENSORINEURAL HEARING LOSS, BILATERAL: ICD-10-CM

## 2024-03-27 DIAGNOSIS — R26.89 OTHER ABNORMALITIES OF GAIT AND MOBILITY: ICD-10-CM

## 2024-03-27 PROCEDURE — 99214 OFFICE O/P EST MOD 30 MIN: CPT

## 2024-03-27 PROCEDURE — 92557 COMPREHENSIVE HEARING TEST: CPT

## 2024-03-27 PROCEDURE — 92550 TYMPANOMETRY & REFLEX THRESH: CPT | Mod: 52

## 2024-03-27 NOTE — DATA REVIEWED
[de-identified] :  reviewed and interpreted by me. B/L SNHL, asymmetry that is stable  [de-identified] : Test was reviewed  and interpreted by me. See official report below.  EXAM:  MR IAC ONLY WAW IC   ORDERED BY: CHRISTIAN OCAMPO  PROCEDURE DATE:  03/09/2024    INTERPRETATION:  Clinical History / Reason for exam: Asymmetric sensorineural hearing loss. Sudden hearing loss, right ear.  Technique: MRI of the internal auditory canals with and without contrast.  Multiplanar multi-sequential MRI of the internal auditory canals was performed before and following intravenous administration of 7 cc Gadavist (0.5 cc discarded).  Correlation with MRI brain from 1/7/2024, CT head 1/7/2024.  FINDINGS:  There is no evidence of abnormal signal or enhancement within the internal auditory canals or cerebellopontine angle cisterns.  There is normal CSF intensity signal within the inner ear structures.  The brainstem and cerebellum are unremarkable.  There is left foramen magnum signal abnormality corresponding to asymmetric calcification on CT. No mass effect or definite enhancing mass.  Axial T2 weighted images of the brain demonstrate normal size and configuration of the ventricles and cortical sulci. There is white matter disease, stable since the reference MRI and compatible with chronic microvascular change. There is no extra-axial fluid collection or midline shift.  The vascular flow voids at the skull base are maintained.  There is trace bilateral mastoid fluid. The patient is status post bilateral cataract surgery.  IMPRESSION:  Unremarkable MRI of the internal auditory canals.

## 2024-03-27 NOTE — HISTORY OF PRESENT ILLNESS
[FreeTextEntry1] : Patient returns today c/o sudden hearing loss, Asymmetric SNHL.  She had MR IAC performed, here to discuss results . She has been having episode of vertigo . Stroke 01/07/24

## 2024-03-27 NOTE — REASON FOR VISIT
[Subsequent Evaluation] : a subsequent evaluation for [FreeTextEntry2] : sudden hearing loss, Asymmetric SNHL

## 2024-03-29 ENCOUNTER — APPOINTMENT (OUTPATIENT)
Dept: CARDIOLOGY | Facility: CLINIC | Age: 84
End: 2024-03-29

## 2024-04-15 ENCOUNTER — APPOINTMENT (OUTPATIENT)
Dept: OTOLARYNGOLOGY | Facility: CLINIC | Age: 84
End: 2024-04-15
Payer: MEDICARE

## 2024-04-15 PROCEDURE — V5299A: CUSTOM

## 2024-04-22 ENCOUNTER — APPOINTMENT (OUTPATIENT)
Dept: CARDIOLOGY | Facility: CLINIC | Age: 84
End: 2024-04-22
Payer: MEDICARE

## 2024-04-22 VITALS
HEIGHT: 64 IN | HEART RATE: 82 BPM | WEIGHT: 145 LBS | SYSTOLIC BLOOD PRESSURE: 134 MMHG | DIASTOLIC BLOOD PRESSURE: 70 MMHG | BODY MASS INDEX: 24.75 KG/M2

## 2024-04-22 DIAGNOSIS — I49.3 VENTRICULAR PREMATURE DEPOLARIZATION: ICD-10-CM

## 2024-04-22 DIAGNOSIS — I25.10 ATHEROSCLEROTIC HEART DISEASE OF NATIVE CORONARY ARTERY W/OUT ANGINA PECTORIS: ICD-10-CM

## 2024-04-22 DIAGNOSIS — F41.9 ANXIETY DISORDER, UNSPECIFIED: ICD-10-CM

## 2024-04-22 DIAGNOSIS — R00.1 BRADYCARDIA, UNSPECIFIED: ICD-10-CM

## 2024-04-22 DIAGNOSIS — I10 ESSENTIAL (PRIMARY) HYPERTENSION: ICD-10-CM

## 2024-04-22 DIAGNOSIS — I35.1 NONRHEUMATIC AORTIC (VALVE) INSUFFICIENCY: ICD-10-CM

## 2024-04-22 DIAGNOSIS — I35.0 NONRHEUMATIC AORTIC (VALVE) STENOSIS: ICD-10-CM

## 2024-04-22 PROCEDURE — 99214 OFFICE O/P EST MOD 30 MIN: CPT | Mod: 25

## 2024-04-22 PROCEDURE — 93000 ELECTROCARDIOGRAM COMPLETE: CPT

## 2024-04-22 RX ORDER — VIT C/E/ZN/COPPR/LUTEIN/ZEAXAN 250MG-90MG
CAPSULE ORAL
Refills: 0 | Status: ACTIVE | COMMUNITY

## 2024-04-22 RX ORDER — POLYETHYLENE GLYCOL 400 AND PROPYLENE GLYCOL 4; 3 MG/ML; MG/ML
SOLUTION/ DROPS OPHTHALMIC
Refills: 0 | Status: ACTIVE | COMMUNITY

## 2024-04-22 RX ORDER — POTASSIUM 75 MG
TABLET ORAL DAILY
Refills: 0 | Status: ACTIVE | COMMUNITY

## 2024-04-22 RX ORDER — PANTOPRAZOLE 40 MG/1
40 TABLET, DELAYED RELEASE ORAL DAILY
Qty: 90 | Refills: 1 | Status: DISCONTINUED | COMMUNITY
Start: 2021-11-18 | End: 2024-04-22

## 2024-04-22 RX ORDER — MECLIZINE HYDROCHLORIDE 12.5 MG/1
12.5 TABLET ORAL DAILY
Refills: 0 | Status: ACTIVE | COMMUNITY

## 2024-04-22 NOTE — DISCUSSION/SUMMARY
[FreeTextEntry1] : Cont ASA Cont Lipitor EP evaluation for ILR as planned (scheduled). Again stressed need to address underlying anxiety.  Referred to Dr. Eduardo Thakkar Follow-up 4 months. [EKG obtained to assist in diagnosis and management of assessed problem(s)] : EKG obtained to assist in diagnosis and management of assessed problem(s)

## 2024-04-22 NOTE — REASON FOR VISIT
[FreeTextEntry1] : Clinically stable.  No interval cardiac symptoms.  Progressive anxiety.  Classic symptoms.  Reliving prior trauma.  Needs to escape other peoples houses when visiting.  Poor appetite.  Lost 7 pounds.  Preoccupied with health conditions.  Not particularly depressed nor suicidal, however, worried about psychotropic medications because father committed suicide.  Did not get ILR.  Confused about appointment.

## 2024-04-22 NOTE — PHYSICAL EXAM
[de-identified] : well appearing, overweight, no distress [de-identified] : alert, normal affect, logical conversation

## 2024-04-22 NOTE — HISTORY OF PRESENT ILLNESS
[FreeTextEntry1] : 79 year-old female presents to \[facility]\"" care. Previously followed with Dr. Alford.  Cardiac history: CAD s/p PCI LAD / CX (2007). Repeat PCI 2011 Murmur  Worsening exertional dyspnea / fatigue x several months.  Difficulty with steps.  Breathing comfortable at rest.  Vague chest discomfort.  Somewhat reminiscent of prior angina, but less intense.  Usually exertional, but occasionally at rest.  Nocturnal palpitations in bed.  Mild brief lightheadedness.  No syncope.  Notably, admitted baseline anxiety is worse secondary to [place].

## 2024-05-01 ENCOUNTER — APPOINTMENT (OUTPATIENT)
Dept: ELECTROPHYSIOLOGY | Facility: CLINIC | Age: 84
End: 2024-05-01
Payer: MEDICARE

## 2024-05-01 VITALS
SYSTOLIC BLOOD PRESSURE: 130 MMHG | HEIGHT: 64 IN | HEART RATE: 82 BPM | WEIGHT: 143 LBS | BODY MASS INDEX: 24.41 KG/M2 | DIASTOLIC BLOOD PRESSURE: 76 MMHG

## 2024-05-01 PROCEDURE — 93000 ELECTROCARDIOGRAM COMPLETE: CPT

## 2024-05-01 PROCEDURE — 99204 OFFICE O/P NEW MOD 45 MIN: CPT | Mod: 25

## 2024-05-01 RX ORDER — ATORVASTATIN CALCIUM 40 MG/1
40 TABLET, FILM COATED ORAL DAILY
Refills: 0 | Status: ACTIVE | COMMUNITY

## 2024-05-01 RX ORDER — FAMOTIDINE 40 MG/1
40 TABLET, FILM COATED ORAL TWICE DAILY
Refills: 0 | Status: DISCONTINUED | COMMUNITY
End: 2024-05-01

## 2024-05-01 NOTE — ASSESSMENT
[FreeTextEntry1] : ## Cryptogenic CVA   ## Sinus Bradycardia     - Prior MCOT reviewed. No signs of heart block.   - No AF.   - We discussed long-term monitoring with loop recorder due to her cryptogenic CVA. Agreeable.  - BP better.   - BP diary at home.   - RTC after ILR.

## 2024-05-01 NOTE — HISTORY OF PRESENT ILLNESS
[FreeTextEntry1] : Ms. Montoya is an 83-year-old female with PMHx of CAD s/p PCI, severe aortic stenosis s/p TAVR, HTN, GERD, anxiety, DLD, recent cryptogenic CVA, is here for management of CVA.    Denies chest pain, shortness of breath, palpitation, dizziness or LOC except noted above.  EKG (05/01/2024): SR @ 82, , QRS 74, QTc 391.  Echo (01/2024): EF 61%.  Cardio: Dr. Alford --> Dr. Lux

## 2024-05-01 NOTE — ADDENDUM
[FreeTextEntry1] : Julia OLVIO assisted in documentation on 05/01/2024 acting as a scribe for Dr. Ciera Pierce.

## 2024-05-15 ENCOUNTER — APPOINTMENT (OUTPATIENT)
Dept: OTOLARYNGOLOGY | Facility: CLINIC | Age: 84
End: 2024-05-15

## 2024-06-11 ENCOUNTER — OUTPATIENT (OUTPATIENT)
Dept: OUTPATIENT SERVICES | Facility: HOSPITAL | Age: 84
LOS: 1 days | Discharge: HOME | End: 2024-06-11
Payer: MEDICARE

## 2024-06-11 DIAGNOSIS — Z96.651 PRESENCE OF RIGHT ARTIFICIAL KNEE JOINT: Chronic | ICD-10-CM

## 2024-06-11 DIAGNOSIS — Z90.710 ACQUIRED ABSENCE OF BOTH CERVIX AND UTERUS: Chronic | ICD-10-CM

## 2024-06-11 DIAGNOSIS — R00.1 BRADYCARDIA, UNSPECIFIED: ICD-10-CM

## 2024-06-11 DIAGNOSIS — Z90.49 ACQUIRED ABSENCE OF OTHER SPECIFIED PARTS OF DIGESTIVE TRACT: Chronic | ICD-10-CM

## 2024-06-11 PROCEDURE — C1764: CPT

## 2024-06-11 PROCEDURE — 33285 INSJ SUBQ CAR RHYTHM MNTR: CPT

## 2024-06-11 RX ORDER — CEPHALEXIN 500 MG
500 CAPSULE ORAL ONCE
Refills: 0 | Status: COMPLETED | OUTPATIENT
Start: 2024-06-11 | End: 2024-06-11

## 2024-06-11 RX ADMIN — Medication 500 MILLIGRAM(S): at 08:24

## 2024-06-11 NOTE — CHART NOTE - NSCHARTNOTEFT_GEN_A_CORE
Electrophysiology Brief Post-Op Note    I have personally seen and examined the patient.  I agree with the history and physical which I have reviewed and noted any changes below.  06-11-24 @ 10:13    PRE-OP DIAGNOSIS: Cryptogenic CVA    POST-OP DIAGNOSIS: Cryptogenic CVA    PROCEDURE: Loop Implant    Physician: Dr. Pierce  Assistant: MAHESH Sandoval    ESTIMATED BLOOD LOSS:  2  mL    ANESTHESIA TYPE:  [  ]General Anesthesia  [  ] Sedation  [X  ] Local/Regional    CONDITION  [  ] Critical  [  ] Serious  [  ]Fair  [ X ]Good    SPECIMENS REMOVED (IF APPLICABLE):  none    IMPLANTS (IF APPLICABLE)  Loop Recorder ([employer]): [id number]  R wave Amplitude: 0.89mV    FINDINGS  PLAN OF CARE  - F/U 3-4 weeks  - May remove bandaid in 2 days  - May shower in 48 hours

## 2024-06-11 NOTE — H&P ADULT - NSHPPHYSICALEXAM_GEN_ALL_CORE
GENERAL:  84y/o Female NAD, resting comfortably.  HEAD:  Atraumatic, Normocephalic  EYES: EOMI, PERRLA, conjunctiva and sclera clear  NECK: Supple, No JVD, no cervical lymphadenopathy, non-tender  CHEST/LUNG: Clear to auscultation bilaterally; No wheeze, rhonchi, or rales  HEART: Regular rate and rhythm; S1&S2  ABDOMEN: Soft, Nontender, Nondistended x 4 quadrants; Bowel sounds present  EXTREMITIES:   Peripheral Pulses Present, No clubbing, no cyanosis, or no edema, no calf tenderness  PSYCH: AAOx3, cooperative, appropriate  NEUROLOGY: WNL  SKIN: WNL

## 2024-06-11 NOTE — DISCHARGE NOTE PROVIDER - NSDCACTIVITY_GEN_ALL_CORE
Adult Initial Assessment (Part II)  (To Be Completed by the Provider)      Fermin Whitaker : 1970 MRN: [id number] AGE: 54 year old    2024   This visit is being performed via video,  Clinician Location: [facility] Christensen is in [place] and his identity has been established.       Chief Complaint in Patient's Own Words:     Brief History of Presenting Problem(s) Onset, Duration of Symptoms/Precipitating Events:     This is an 55yo male with a past psychiatric hx significant for MDD, JET, alcohol use disorder, admitted to [facility] level of care stepping down from inpatient psychiatry.    Patient was admitted recently after SI, and had stopped taking his metformin and increased his alcohol intake.     Patient states he had began drinking more 2 years ago when they started to have more parties at their house. He use to drink at night and it helped him sleep. He has been sleeping well since he got home recently.     Notes historic feeling of hopelessness and guilt, embarrassment that has been better now. Patient is looking to move out of his house and move on to something better. States house is too big. Denies current SI.  Notes anhedonia has improved and feels motivation is returning.     Stressors:  Conflict with ex-wife, feels it is causing tension with his 14yo daughter as well, and wife has prevented him from seeing her. He is unable to get custody of her. He wants to wait for an opportunity when she will reach out to her.      Past Psych Hx  DX  MDD, recurrent, severe  Generalized Anxiety Disorder  Alcohol use disorder    Current meds  Wellbutrin XL 300mg QD  Lexapro 30mg QD  Naltrexone 50mg QD   Hydroxyzine 50mg QHS    Past meds  Denies    Outpatient  Denies    Hospitalizations/Suicide Attempts:  Denies      Substance Hx:  Tobacco: Denies  Alcohol: Denies  Rec Drugs: Denies  Detox/Rehab: Never    Family Hx:  Denies    Social Hx  Born: [place]  Abuse/trauma: Denies  Education: BA in engineering  Work: Construction management,   Relationship: , one daughter  Living: Living alone in his house    Legal Hx:  Order of protection against daughter        MENTAL STATUS EXAM    Hygiene Concerns:  []  Yes   [x]  No   Describe:    Appearance:   [x]  Unremarkable  []  Other    Distress:  []  Acute  []  Moderate   []  Mild    [x]  None  Gait:   [x]  Normal  []  Slow/Retarded  []  Hyper  Posture:  [x]  Normal  []  Slumped  []  Rigid    Eye Contact: [x]  Maintained  [] Avoided [] [place] intense  [] Improving  Mannerisms:   [x]  Unremarkable   [] Gestures [] Grimaces  [] Twitches/Tics     []  Tremor  []  Other  Behavior:  [x]  Normal  []  Restless  []  Compulsive  []  Tremulous     []  Lethargic  []  Uncoordinated  Mood/Feelings: []  Depressed  []  Irritable  [x]  Anxious  []  Fearful  []  Euphoric     []  Labile  []  Grief  []  Paranoia   []  Panic  [x] Guilt/shame     []  Apathy/indifference  []  Jealousy  []  Helpless  []  Hopeless     []  Euthymic  []  Other    Affect:  []  Normal  [x]  Constricted  [] Flat  []  Blunted      [] Appropriate to Content   []Inappropriate to Content  Thought Processes: [x]  Congruent  []  Incongruent  [] Loose Associations     []  Poverty of Ideas  []  Tangential    []  Incoherence     []  Blocking/thought interruption  Thought Content: [x]  Normal  []  Delusions  []  Obsessions  []  Phobias     []  Hypochondria  []  Sexual Preoccupation  Perceptual Problems: [x]  None  [] Hallucinations  []  Auditory  [] Visual  [] Perceptual  Orientation:  [x]  Normal/No Impairment  []  Person  []  Place  []  Time  Speech:  [x]  Clear/Articulate  []  Soft  []  Loud  []  Pressured  []  Animated     []  Rambling  []  Slurred  Insight:  []  Good  [x]  Fair  []  Poor  Judgement:  []  Good  [x]  Fair  []  Poor  Recent Memory: []  Good  [x]  Fair  []  Poor  Remote Memory: []  Good  [x]  Fair  []  Poor  Concentration: []   Good  []  Fair  [x]  Poor  Attention:  []  Good  [x]  Fair  []  Poor  Level of Engagement:   [x]  Open  []  Guarded    Resistant      Current Outpatient Medications   Medication Sig Dispense Refill    [START ON 6/15/2024] atorvastatin (LIPITOR) 80 MG tablet Take 1 tablet by mouth daily. Begin taking on Rosanne 15, 2024. Restart if Liver tests are improved. FU with PCP regarding blood work and restarting this medicine      escitalopram (LEXAPRO) 10 MG tablet Take 3 tablets by mouth daily. 90 tablet 0    hydrOXYzine (ATARAX) 50 MG tablet Take 1 tablet by mouth nightly. 30 tablet 0    nalTREXone (REVIA) 50 MG tablet Take 1 tablet by mouth daily. 30 tablet 0    traZODone (DESYREL) 100 MG tablet Take 1 tablet by mouth nightly. 30 tablet 0    buPROPion XL (WELLBUTRIN XL) 300 MG 24 hr tablet Take 1 tablet by mouth daily. 30 tablet 0    dapagliflozin-metFORMIN (Xigduo XR) 5-1000 MG extended-release tablet Take 1 tablet by mouth in the morning and 1 tablet in the evening. Take before meals.      methocarbamol (ROBAXIN) 750 MG tablet Take 1,500 mg by mouth every 4 to 6 hours as needed.      Budeson-Glycopyrrol-Formoterol 160-9-4.8 MCG/ACT Aerosol Inhale 2 puffs into the lungs in the morning and 2 puffs in the evening.      pioglitazone (ACTOS) 45 MG tablet Take 45 mg by mouth daily.      lisinopril (ZESTRIL) 20 MG tablet Take 20 mg by mouth 2 times daily. Indications: hold in AM DOS       Icosapent Ethyl 1 g Cap Take 2 g by mouth 2 times daily.      metoPROLOL succinate (TOPROL-XL) 25 MG 24 hr tablet Take 25 mg by mouth 2 times daily. Indications: take in AM DOS   2    Multiple Vitamins-Minerals (VITAMIN - THERAPEUTIC MULTIVITAMINS W/MINERALS) tablet Take 1 tablet by mouth daily.       No current facility-administered medications for this encounter.        (Not in a hospital admission)            Last Recorded Vitals  There were no vitals taken for this visit.       Laboratory Data     No results found    Invalid input(s):  \"NEUTOPHILPCT\", \"BANDSPCT\", \"LYMPHOPCT\", \"MONOPCT\", \"EOSPCT\"    No results found    Invalid input(s): \"CL\", \"MGNSM\", \"PHOSPH\"    No results found    Invalid input(s): \"CBMZ\"     No results found    Invalid input(s): \"TTLCHOL\", \"HDLCHOLSTRL\", \"LDLCA\"    DIAGNOSIS:  MDD, recurrent, severe  Generalized Anxiety Disorder  Alcohol use disorder       RECOMMENDATION/PLAN:   A structured psychiatric interview was administered, meeting the SCID-5 guidelines for appropriate DSM-5 diagnoses.  Patient to begin at low level of care - to adhere to medication management and group attendance.  Patient is at low risk of harm to self or others at this time.  Psychopharmacologic Intervention:  -Wellbutrin XL 300mg QD  -Lexapro 30mg QD  -Naltrexone 50mg QD   -Hydroxyzine 50mg QHS  Brief CBT provided  F/U in 1 week      Refer for Psychotherapy?:   [x]  Yes   []  No  Estimated Length of Treatment: 20    Patient given emergency 24 hour access information?  [x]   Yes   []  No    Discussed risks, benefits, potential side effects, and alteratives to treatment for all medication changes. Obtained informed consent for all medication changes. Instructed patient that if they have any questions to call provider's office during regular work hours. Instructed patient that if they experience any significant worsening of symptoms, severe side effects to medication, or intent or plan to harm themselves or others to call 911 or visit emergency department.                   No heavy lifting/straining

## 2024-06-11 NOTE — H&P ADULT - HISTORY OF PRESENT ILLNESS
Ms. Montoya is an 83-year-old female with PMHx of CAD s/p PCI, severe aortic stenosis s/p TAVR, HTN, GERD, anxiety, DLD, recent cryptogenic CVA, is here for management of CVA.   Denies chest pain, shortness of breath, palpitation, dizziness or LOC except noted above.   Cardio: Dr. Alford --> Dr. Lux

## 2024-06-11 NOTE — H&P ADULT - ASSESSMENT
Ms. Montoya is an 83-year-old female with PMHx of CAD s/p PCI, severe aortic stenosis s/p TAVR, HTN, GERD, anxiety, DLD, recent cryptogenic CVA, is here for ILR implantation for management of CVA.

## 2024-06-12 DIAGNOSIS — I63.9 CEREBRAL INFARCTION, UNSPECIFIED: ICD-10-CM

## 2024-07-03 ENCOUNTER — APPOINTMENT (OUTPATIENT)
Dept: ELECTROPHYSIOLOGY | Facility: CLINIC | Age: 84
End: 2024-07-03

## 2024-07-03 VITALS
RESPIRATION RATE: 17 BRPM | DIASTOLIC BLOOD PRESSURE: 83 MMHG | HEART RATE: 72 BPM | TEMPERATURE: 97.6 F | BODY MASS INDEX: 24.75 KG/M2 | SYSTOLIC BLOOD PRESSURE: 165 MMHG | WEIGHT: 145 LBS | HEIGHT: 64 IN

## 2024-07-03 DIAGNOSIS — Z45.09 ENCOUNTER FOR ADJUSTMENT AND MANAGEMENT OF OTHER CARDIAC DEVICE: ICD-10-CM

## 2024-07-03 DIAGNOSIS — I63.9 CEREBRAL INFARCTION, UNSPECIFIED: ICD-10-CM

## 2024-07-03 DIAGNOSIS — Z48.89 ENCOUNTER FOR OTHER SPECIFIED SURGICAL AFTERCARE: ICD-10-CM

## 2024-07-03 PROCEDURE — 99213 OFFICE O/P EST LOW 20 MIN: CPT | Mod: 25

## 2024-07-05 PROBLEM — Z48.89 ENCOUNTER FOR POSTOPERATIVE WOUND CHECK: Status: ACTIVE | Noted: 2024-07-05

## 2024-07-05 PROBLEM — Z45.09 ENCOUNTER FOR LOOP RECORDER CHECK: Status: ACTIVE | Noted: 2024-07-05

## 2024-07-05 RX ORDER — OMEPRAZOLE 40 MG/1
40 CAPSULE, DELAYED RELEASE ORAL
Refills: 0 | Status: ACTIVE | COMMUNITY

## 2024-07-15 ENCOUNTER — APPOINTMENT (OUTPATIENT)
Dept: NEUROLOGY | Facility: CLINIC | Age: 84
End: 2024-07-15
Payer: MEDICARE

## 2024-07-15 PROCEDURE — 95816 EEG AWAKE AND DROWSY: CPT

## 2024-07-17 ENCOUNTER — NON-APPOINTMENT (OUTPATIENT)
Age: 84
End: 2024-07-17

## 2024-08-07 ENCOUNTER — APPOINTMENT (OUTPATIENT)
Dept: CARDIOLOGY | Facility: CLINIC | Age: 84
End: 2024-08-07

## 2024-08-07 ENCOUNTER — NON-APPOINTMENT (OUTPATIENT)
Age: 84
End: 2024-08-07

## 2024-08-07 PROCEDURE — 93298 REM INTERROG DEV EVAL SCRMS: CPT

## 2024-08-26 ENCOUNTER — APPOINTMENT (OUTPATIENT)
Dept: CARDIOLOGY | Facility: CLINIC | Age: 84
End: 2024-08-26
Payer: MEDICARE

## 2024-08-26 VITALS
BODY MASS INDEX: 24.75 KG/M2 | DIASTOLIC BLOOD PRESSURE: 82 MMHG | WEIGHT: 145 LBS | SYSTOLIC BLOOD PRESSURE: 132 MMHG | HEART RATE: 83 BPM | HEIGHT: 64 IN

## 2024-08-26 DIAGNOSIS — I35.0 NONRHEUMATIC AORTIC (VALVE) STENOSIS: ICD-10-CM

## 2024-08-26 DIAGNOSIS — I25.10 ATHEROSCLEROTIC HEART DISEASE OF NATIVE CORONARY ARTERY W/OUT ANGINA PECTORIS: ICD-10-CM

## 2024-08-26 DIAGNOSIS — I35.1 NONRHEUMATIC AORTIC (VALVE) INSUFFICIENCY: ICD-10-CM

## 2024-08-26 DIAGNOSIS — R00.1 BRADYCARDIA, UNSPECIFIED: ICD-10-CM

## 2024-08-26 DIAGNOSIS — I49.3 VENTRICULAR PREMATURE DEPOLARIZATION: ICD-10-CM

## 2024-08-26 DIAGNOSIS — I10 ESSENTIAL (PRIMARY) HYPERTENSION: ICD-10-CM

## 2024-08-26 PROCEDURE — 99214 OFFICE O/P EST MOD 30 MIN: CPT | Mod: 25

## 2024-08-26 PROCEDURE — 93000 ELECTROCARDIOGRAM COMPLETE: CPT

## 2024-08-26 RX ORDER — ESCITALOPRAM OXALATE 5 MG/1
5 TABLET ORAL
Refills: 0 | Status: ACTIVE | COMMUNITY

## 2024-08-26 RX ORDER — ATORVASTATIN CALCIUM 80 MG/1
80 TABLET, FILM COATED ORAL
Qty: 30 | Refills: 0 | Status: ACTIVE | COMMUNITY
Start: 2024-01-16

## 2024-08-26 NOTE — ASSESSMENT
[FreeTextEntry1] : CAD s/p PCI (2007 / 2011). No angina.  Severe AS s/p TAVR nL THV function  Symptomatic bradycardia / SSS ? Occult CVA (possibly embolic).  At risk for atrial fibrillation.

## 2024-08-26 NOTE — PHYSICAL EXAM
[de-identified] : well appearing, overweight, no distress [de-identified] : Regular, normal S1-S2, no murmur, rub, gallop [de-identified] : CTA [de-identified] : alert, normal affect, logical conversation

## 2024-08-26 NOTE — DISCUSSION/SUMMARY
[FreeTextEntry1] : Cont ASA Cont Lipitor Follow-up 4 months [EKG obtained to assist in diagnosis and management of assessed problem(s)] : EKG obtained to assist in diagnosis and management of assessed problem(s)

## 2024-09-10 ENCOUNTER — NON-APPOINTMENT (OUTPATIENT)
Age: 84
End: 2024-09-10

## 2024-09-11 ENCOUNTER — APPOINTMENT (OUTPATIENT)
Dept: CARDIOLOGY | Facility: CLINIC | Age: 84
End: 2024-09-11
Payer: MEDICARE

## 2024-09-11 PROCEDURE — 93298 REM INTERROG DEV EVAL SCRMS: CPT

## 2024-10-16 ENCOUNTER — APPOINTMENT (OUTPATIENT)
Dept: CARDIOLOGY | Facility: CLINIC | Age: 84
End: 2024-10-16
Payer: MEDICARE

## 2024-10-16 ENCOUNTER — NON-APPOINTMENT (OUTPATIENT)
Age: 84
End: 2024-10-16

## 2024-10-16 PROCEDURE — 93298 REM INTERROG DEV EVAL SCRMS: CPT

## 2024-10-21 NOTE — PHYSICAL THERAPY INITIAL EVALUATION ADULT - GAIT DEVIATIONS NOTED, PT EVAL
Goal Outcome Evaluation:  Plan of Care Reviewed With: patient        Progress: improving  Outcome Evaluation: Pt agreed to PT and had pain with movement but was unable to rate.Pt was mod x1 supine to sit and she was CGA-SBA for seated balance.She was mod x 2 to stand HHA with RN assisting.Pt wanted to keep legs crossed and had difficulty bring knees apart.Pt would benefit from continued rehab at SNF but pt plans to go home with family, HH and hospital bed.                              decreased nitza

## 2024-10-30 ENCOUNTER — APPOINTMENT (OUTPATIENT)
Dept: OTOLARYNGOLOGY | Facility: CLINIC | Age: 84
End: 2024-10-30
Payer: MEDICARE

## 2024-10-30 VITALS — HEIGHT: 64 IN | BODY MASS INDEX: 23.9 KG/M2 | WEIGHT: 140 LBS

## 2024-10-30 DIAGNOSIS — R26.89 OTHER ABNORMALITIES OF GAIT AND MOBILITY: ICD-10-CM

## 2024-10-30 DIAGNOSIS — H93.8X3 OTHER SPECIFIED DISORDERS OF EAR, BILATERAL: ICD-10-CM

## 2024-10-30 DIAGNOSIS — H90.3 SENSORINEURAL HEARING LOSS, BILATERAL: ICD-10-CM

## 2024-10-30 DIAGNOSIS — H61.23 IMPACTED CERUMEN, BILATERAL: ICD-10-CM

## 2024-10-30 PROCEDURE — 99213 OFFICE O/P EST LOW 20 MIN: CPT

## 2024-11-03 NOTE — REASON FOR VISIT
[FreeTextEntry1] : Clinically stable.  Anxiety remains significant issue, however, she did recently start low-dose SNRI and is trying to find a therapist.  Unfortunately, insurance coverage is an issue and could not see Dr. Thakkar.  Seems to have an overall more positive outlook.  Had ILR.  No arrhythmias to date.  No interval cardiac symptoms.  Tolerating medications.  Labs reviewed (8/2024): A1c 6.0 CBC, CMP, TSH, lipids otherwise unremarkable
none

## 2024-11-13 ENCOUNTER — APPOINTMENT (OUTPATIENT)
Dept: OTOLARYNGOLOGY | Facility: CLINIC | Age: 84
End: 2024-11-13

## 2024-11-20 ENCOUNTER — NON-APPOINTMENT (OUTPATIENT)
Age: 84
End: 2024-11-20

## 2024-11-20 ENCOUNTER — APPOINTMENT (OUTPATIENT)
Dept: CARDIOLOGY | Facility: CLINIC | Age: 84
End: 2024-11-20
Payer: MEDICARE

## 2024-11-20 PROCEDURE — 93298 REM INTERROG DEV EVAL SCRMS: CPT

## 2024-12-24 ENCOUNTER — APPOINTMENT (OUTPATIENT)
Dept: CARDIOLOGY | Facility: CLINIC | Age: 84
End: 2024-12-24
Payer: MEDICARE

## 2024-12-24 ENCOUNTER — NON-APPOINTMENT (OUTPATIENT)
Age: 84
End: 2024-12-24

## 2024-12-24 PROCEDURE — 93298 REM INTERROG DEV EVAL SCRMS: CPT

## 2024-12-30 NOTE — ED ADULT NURSE NOTE - PATIENT'S PREFERRED PRONOUN
Injectable Influenza Immunization Documentation    1.  Is the person to be vaccinated sick today?   No    2. Does the person to be vaccinated have an allergy to a component   of the vaccine?   No  Egg Allergy Algorithm Link    3. Has the person to be vaccinated ever had a serious reaction   to influenza vaccine in the past?   No    4. Has the person to be vaccinated ever had Guillain-Barré syndrome?   No    Form completed by Charleen Alcala CMA     Prior to injection verified patient identity using patient's name and date of birth.  Due to injection administration, patient instructed to remain in clinic for 15 minutes  afterwards, and to report any adverse reaction to me immediately.              Her/She None known in lifetime

## 2025-01-01 ENCOUNTER — RESULT REVIEW (OUTPATIENT)
Age: 85
End: 2025-01-01

## 2025-01-24 ENCOUNTER — APPOINTMENT (OUTPATIENT)
Dept: ELECTROPHYSIOLOGY | Facility: CLINIC | Age: 85
End: 2025-01-24
Payer: MEDICARE

## 2025-01-24 VITALS
HEART RATE: 71 BPM | SYSTOLIC BLOOD PRESSURE: 175 MMHG | HEIGHT: 64 IN | BODY MASS INDEX: 25.61 KG/M2 | DIASTOLIC BLOOD PRESSURE: 77 MMHG | TEMPERATURE: 98 F | WEIGHT: 150 LBS

## 2025-01-24 DIAGNOSIS — I10 ESSENTIAL (PRIMARY) HYPERTENSION: ICD-10-CM

## 2025-01-24 DIAGNOSIS — Z45.09 ENCOUNTER FOR ADJUSTMENT AND MANAGEMENT OF OTHER CARDIAC DEVICE: ICD-10-CM

## 2025-01-24 DIAGNOSIS — I49.3 VENTRICULAR PREMATURE DEPOLARIZATION: ICD-10-CM

## 2025-01-24 DIAGNOSIS — I63.9 CEREBRAL INFARCTION, UNSPECIFIED: ICD-10-CM

## 2025-01-24 DIAGNOSIS — R00.1 BRADYCARDIA, UNSPECIFIED: ICD-10-CM

## 2025-01-24 PROCEDURE — 93291 INTERROG DEV EVAL SCRMS IP: CPT

## 2025-01-24 PROCEDURE — 99214 OFFICE O/P EST MOD 30 MIN: CPT

## 2025-01-27 ENCOUNTER — EMERGENCY (EMERGENCY)
Facility: HOSPITAL | Age: 85
LOS: 0 days | Discharge: HOME | End: 2025-01-27
Attending: EMERGENCY MEDICINE
Payer: MEDICARE

## 2025-01-27 VITALS
SYSTOLIC BLOOD PRESSURE: 120 MMHG | OXYGEN SATURATION: 99 % | DIASTOLIC BLOOD PRESSURE: 79 MMHG | RESPIRATION RATE: 18 BRPM | HEART RATE: 85 BPM | TEMPERATURE: 98 F | WEIGHT: 145.95 LBS

## 2025-01-27 VITALS
HEART RATE: 84 BPM | SYSTOLIC BLOOD PRESSURE: 155 MMHG | OXYGEN SATURATION: 97 % | RESPIRATION RATE: 18 BRPM | DIASTOLIC BLOOD PRESSURE: 70 MMHG

## 2025-01-27 DIAGNOSIS — R29.810 FACIAL WEAKNESS: ICD-10-CM

## 2025-01-27 DIAGNOSIS — R51.9 HEADACHE, UNSPECIFIED: ICD-10-CM

## 2025-01-27 DIAGNOSIS — Z90.49 ACQUIRED ABSENCE OF OTHER SPECIFIED PARTS OF DIGESTIVE TRACT: Chronic | ICD-10-CM

## 2025-01-27 DIAGNOSIS — Z86.73 PERSONAL HISTORY OF TRANSIENT ISCHEMIC ATTACK (TIA), AND CEREBRAL INFARCTION WITHOUT RESIDUAL DEFICITS: ICD-10-CM

## 2025-01-27 DIAGNOSIS — Z96.651 PRESENCE OF RIGHT ARTIFICIAL KNEE JOINT: Chronic | ICD-10-CM

## 2025-01-27 DIAGNOSIS — Z90.710 ACQUIRED ABSENCE OF BOTH CERVIX AND UTERUS: Chronic | ICD-10-CM

## 2025-01-27 DIAGNOSIS — E78.5 HYPERLIPIDEMIA, UNSPECIFIED: ICD-10-CM

## 2025-01-27 DIAGNOSIS — R42 DIZZINESS AND GIDDINESS: ICD-10-CM

## 2025-01-27 DIAGNOSIS — Z86.79 PERSONAL HISTORY OF OTHER DISEASES OF THE CIRCULATORY SYSTEM: ICD-10-CM

## 2025-01-27 DIAGNOSIS — I25.10 ATHEROSCLEROTIC HEART DISEASE OF NATIVE CORONARY ARTERY WITHOUT ANGINA PECTORIS: ICD-10-CM

## 2025-01-27 DIAGNOSIS — K21.9 GASTRO-ESOPHAGEAL REFLUX DISEASE WITHOUT ESOPHAGITIS: ICD-10-CM

## 2025-01-27 DIAGNOSIS — R20.2 PARESTHESIA OF SKIN: ICD-10-CM

## 2025-01-27 DIAGNOSIS — I10 ESSENTIAL (PRIMARY) HYPERTENSION: ICD-10-CM

## 2025-01-27 DIAGNOSIS — Z95.5 PRESENCE OF CORONARY ANGIOPLASTY IMPLANT AND GRAFT: ICD-10-CM

## 2025-01-27 LAB
ALBUMIN SERPL ELPH-MCNC: 4.7 G/DL — SIGNIFICANT CHANGE UP (ref 3.5–5.2)
ALP SERPL-CCNC: 91 U/L — SIGNIFICANT CHANGE UP (ref 30–115)
ALT FLD-CCNC: 11 U/L — SIGNIFICANT CHANGE UP (ref 0–41)
ANION GAP SERPL CALC-SCNC: 11 MMOL/L — SIGNIFICANT CHANGE UP (ref 7–14)
APTT BLD: 30.2 SEC — SIGNIFICANT CHANGE UP (ref 27–39.2)
AST SERPL-CCNC: 14 U/L — SIGNIFICANT CHANGE UP (ref 0–41)
BASOPHILS # BLD AUTO: 0.03 K/UL — SIGNIFICANT CHANGE UP (ref 0–0.2)
BASOPHILS NFR BLD AUTO: 0.7 % — SIGNIFICANT CHANGE UP (ref 0–1)
BILIRUB SERPL-MCNC: 0.7 MG/DL — SIGNIFICANT CHANGE UP (ref 0.2–1.2)
BUN SERPL-MCNC: 17 MG/DL — SIGNIFICANT CHANGE UP (ref 10–20)
CALCIUM SERPL-MCNC: 9 MG/DL — SIGNIFICANT CHANGE UP (ref 8.4–10.5)
CHLORIDE SERPL-SCNC: 102 MMOL/L — SIGNIFICANT CHANGE UP (ref 98–110)
CO2 SERPL-SCNC: 26 MMOL/L — SIGNIFICANT CHANGE UP (ref 17–32)
CREAT SERPL-MCNC: 0.7 MG/DL — SIGNIFICANT CHANGE UP (ref 0.7–1.5)
EGFR: 86 ML/MIN/1.73M2 — SIGNIFICANT CHANGE UP
EOSINOPHIL # BLD AUTO: 0.05 K/UL — SIGNIFICANT CHANGE UP (ref 0–0.7)
EOSINOPHIL NFR BLD AUTO: 1.2 % — SIGNIFICANT CHANGE UP (ref 0–8)
GLUCOSE SERPL-MCNC: 128 MG/DL — HIGH (ref 70–99)
HCT VFR BLD CALC: 40.4 % — SIGNIFICANT CHANGE UP (ref 37–47)
HGB BLD-MCNC: 13.5 G/DL — SIGNIFICANT CHANGE UP (ref 12–16)
IMM GRANULOCYTES NFR BLD AUTO: 0.5 % — HIGH (ref 0.1–0.3)
INR BLD: 1.02 RATIO — SIGNIFICANT CHANGE UP (ref 0.65–1.3)
LYMPHOCYTES # BLD AUTO: 1.11 K/UL — LOW (ref 1.2–3.4)
LYMPHOCYTES # BLD AUTO: 25.7 % — SIGNIFICANT CHANGE UP (ref 20.5–51.1)
MCHC RBC-ENTMCNC: 32.8 PG — HIGH (ref 27–31)
MCHC RBC-ENTMCNC: 33.4 G/DL — SIGNIFICANT CHANGE UP (ref 32–37)
MCV RBC AUTO: 98.3 FL — SIGNIFICANT CHANGE UP (ref 81–99)
MONOCYTES # BLD AUTO: 0.28 K/UL — SIGNIFICANT CHANGE UP (ref 0.1–0.6)
MONOCYTES NFR BLD AUTO: 6.5 % — SIGNIFICANT CHANGE UP (ref 1.7–9.3)
NEUTROPHILS # BLD AUTO: 2.83 K/UL — SIGNIFICANT CHANGE UP (ref 1.4–6.5)
NEUTROPHILS NFR BLD AUTO: 65.4 % — SIGNIFICANT CHANGE UP (ref 42.2–75.2)
NRBC # BLD: 0 /100 WBCS — SIGNIFICANT CHANGE UP (ref 0–0)
PLATELET # BLD AUTO: 192 K/UL — SIGNIFICANT CHANGE UP (ref 130–400)
PMV BLD: 10.6 FL — HIGH (ref 7.4–10.4)
POTASSIUM SERPL-MCNC: 3.7 MMOL/L — SIGNIFICANT CHANGE UP (ref 3.5–5)
POTASSIUM SERPL-SCNC: 3.7 MMOL/L — SIGNIFICANT CHANGE UP (ref 3.5–5)
PROT SERPL-MCNC: 6.4 G/DL — SIGNIFICANT CHANGE UP (ref 6–8)
PROTHROM AB SERPL-ACNC: 12 SEC — SIGNIFICANT CHANGE UP (ref 9.95–12.87)
RBC # BLD: 4.11 M/UL — LOW (ref 4.2–5.4)
RBC # FLD: 13.7 % — SIGNIFICANT CHANGE UP (ref 11.5–14.5)
SODIUM SERPL-SCNC: 139 MMOL/L — SIGNIFICANT CHANGE UP (ref 135–146)
TROPONIN T, HIGH SENSITIVITY RESULT: 8 NG/L — SIGNIFICANT CHANGE UP (ref 6–13)
WBC # BLD: 4.32 K/UL — LOW (ref 4.8–10.8)
WBC # FLD AUTO: 4.32 K/UL — LOW (ref 4.8–10.8)

## 2025-01-27 PROCEDURE — 85730 THROMBOPLASTIN TIME PARTIAL: CPT

## 2025-01-27 PROCEDURE — 70498 CT ANGIOGRAPHY NECK: CPT | Mod: MC

## 2025-01-27 PROCEDURE — 70551 MRI BRAIN STEM W/O DYE: CPT | Mod: 26

## 2025-01-27 PROCEDURE — 80053 COMPREHEN METABOLIC PANEL: CPT

## 2025-01-27 PROCEDURE — 85610 PROTHROMBIN TIME: CPT

## 2025-01-27 PROCEDURE — 70450 CT HEAD/BRAIN W/O DYE: CPT | Mod: 26,XU

## 2025-01-27 PROCEDURE — 93010 ELECTROCARDIOGRAM REPORT: CPT

## 2025-01-27 PROCEDURE — 93005 ELECTROCARDIOGRAM TRACING: CPT

## 2025-01-27 PROCEDURE — G0378: CPT

## 2025-01-27 PROCEDURE — 70496 CT ANGIOGRAPHY HEAD: CPT | Mod: MC

## 2025-01-27 PROCEDURE — 70450 CT HEAD/BRAIN W/O DYE: CPT | Mod: MC,XU

## 2025-01-27 PROCEDURE — 70496 CT ANGIOGRAPHY HEAD: CPT | Mod: 26

## 2025-01-27 PROCEDURE — 70551 MRI BRAIN STEM W/O DYE: CPT | Mod: MC

## 2025-01-27 PROCEDURE — 85025 COMPLETE CBC W/AUTO DIFF WBC: CPT

## 2025-01-27 PROCEDURE — 0042T: CPT

## 2025-01-27 PROCEDURE — 36415 COLL VENOUS BLD VENIPUNCTURE: CPT

## 2025-01-27 PROCEDURE — 99223 1ST HOSP IP/OBS HIGH 75: CPT

## 2025-01-27 PROCEDURE — 82962 GLUCOSE BLOOD TEST: CPT

## 2025-01-27 PROCEDURE — 84484 ASSAY OF TROPONIN QUANT: CPT

## 2025-01-27 PROCEDURE — 0042T: CPT | Mod: MC

## 2025-01-27 PROCEDURE — 70498 CT ANGIOGRAPHY NECK: CPT | Mod: 26

## 2025-01-27 PROCEDURE — 99285 EMERGENCY DEPT VISIT HI MDM: CPT | Mod: 25

## 2025-01-27 RX ORDER — ASPIRIN 81 MG
81 TABLET, DELAYED RELEASE (ENTERIC COATED) ORAL DAILY
Refills: 0 | Status: DISCONTINUED | OUTPATIENT
Start: 2025-01-27 | End: 2025-01-27

## 2025-01-27 RX ORDER — CHOLECALCIFEROL (VITAMIN D3) 25 MCG
25 MCG TABLET ORAL DAILY
Refills: 0 | Status: ACTIVE | COMMUNITY

## 2025-01-27 RX ORDER — CLOPIDOGREL BISULFATE 75 MG/1
300 TABLET, FILM COATED ORAL ONCE
Refills: 0 | Status: COMPLETED | OUTPATIENT
Start: 2025-01-27 | End: 2025-01-27

## 2025-01-27 RX ORDER — PANTOPRAZOLE 40 MG/1
40 TABLET, DELAYED RELEASE ORAL
Refills: 0 | Status: DISCONTINUED | OUTPATIENT
Start: 2025-01-27 | End: 2025-01-27

## 2025-01-27 RX ORDER — ATORVASTATIN CALCIUM 40 MG/1
20 TABLET, FILM COATED ORAL AT BEDTIME
Refills: 0 | Status: DISCONTINUED | OUTPATIENT
Start: 2025-01-27 | End: 2025-01-27

## 2025-01-27 RX ADMIN — CLOPIDOGREL BISULFATE 300 MILLIGRAM(S): 75 TABLET, FILM COATED ORAL at 16:13

## 2025-01-27 NOTE — ED ADULT TRIAGE NOTE - CHIEF COMPLAINT QUOTE
Pt. BIBA c/o nausea, dizziness and vomiting after eating breakfast at 11am. Pt. c/o unsteady gait starting at 11am. Pt. has hx of TIA.  in triage.

## 2025-01-27 NOTE — STROKE CODE NOTE - IV ALTEPLASE EXCLUSION ABS OTHER
Patient with NIHSS 1 for baseline left facial asymmetry therefore not a candidate for IV thrombolytics or IA intervention.

## 2025-01-27 NOTE — ED PROVIDER NOTE - PHYSICAL EXAMINATION
General:  Appearance is consistent with chronologic age.  No abnormal facies.  Gross skin survey within normal limits.    Cognitive/Language:  The patient is oriented to person, place, month and age.  Recent and remote memory intact.  Fund of knowledge is intact and normal.  Language with normal repetition, comprehension and naming.  Nondysarthric.    Eyes: intact VA, VFF.  EOMI w/o nystagmus, skew or reported double vision.  PERRL.  No ptosis/weakness of eyelid closure.    Face:  Facial sensation normal V1 - 3, chronic left facial asymmetry.    Formal Muscle Strength Testing: (MRC grade R/L) 5/5 UE; 4+/5 LE.  No observable drift.  Sensory examination:   Intact to light touch in all extremities.  Cerebellum:   FTN/HKS intact with normal ARTURO in all limbs.  No dysmetria or dysdiadokinesia.  Gait exam limited due to dysequilibrium.    NIHSS 1  m-RS 1  Bilateral LE limited ROM chronically and participates with PT/Rehab

## 2025-01-27 NOTE — ED CDU PROVIDER INITIAL DAY NOTE - PHYSICAL EXAMINATION
Vital Signs: I have reviewed the initial vital signs.  Constitutional: NAD, well-nourished, appears stated age, no acute distress.  HEENT: Airway patent, moist MM, no erythema/swelling/deformity of oral structures. EOMI, PERRLA.  CV: regular rate, regular rhythm, well-perfused extremities, 2+ b/l DP and radial pulses equal.  Lungs: BCTA, no increased WOB.  ABD: NTND, no guarding or rebound, no pulsatile mass, no hernias.   MSK: Neck supple, nontender, nl ROM, no stepoff. Chest nontender. Back nontender. Ext nontender, nl rom, no deformity.   INTEG: Skin warm, dry, no rash.  NEURO: A&Ox3, left facial droop, normal strength, nl sensation throughout, normal speech.   PSYCH: Calm, cooperative, normal affect and interaction.

## 2025-01-27 NOTE — ED PROVIDER NOTE - ATTENDING CONTRIBUTION TO CARE
I personally evaluated patient. I agree with the findings and plan with all documentation on chart except as documented  in my note.    84-year-old female past medical history of hypertension, CAD status post stent, aortic stenosis status post TAVR, GERD, anxiety, dyslipidemia presents to the emergency department with headache and dizziness that started at 11 AM today.  Stroke code was activated in triage.  Patient denies any falls or injuries.  Patient reports having numbness to bilateral lower extremities and has a chronic left facial droop from prior stroke.  No chest pain or shortness of breath.  Patient denies any fever, chills, recent illness.    Patient was immediately seen and evaluated and neurology evaluated patient.  Lower extremity numbness and tingling resolved upon arrival and headache improved.  Patient has a left facial droop from prior stroke and that is her only NIH stroke score point with NIH score 1.  Patient went for emergent head CT and CTA of head and neck.    ED workup reviewed and patient given Plavix load and was reevaluated.  Overall, symptoms are improving.  CTA shows no stenosis, aneurysm, dissection.  CT perfusion shows no acute perfusion abnormalities.  Neurology recommendations appreciated and patient to go to op status for further neuro checks and possible MRI.

## 2025-01-27 NOTE — ED ADULT NURSE NOTE - NS ED NOTE ABUSE RESPONSE YN
Anticoagulation Progress Note    Indication: A-FIB, CHADS2-1    Goal INR: 2.0-3.0  Duration:longterm   Order Date: 12/18/2019    Pertinent History: HTN    Assessment    High-Risk Medications: no  Significant Findings: no  Hospitalizations / Procedures: no  Vitamin K goal: no  Other: no      Plan     2 mg and 2.5 mg tablets  INR Result: 2.3  The INR result for today is therapeutic based on the INR goal 2.0-3.0.  Recommended Dose: CPM: 5 mg Sun,W; 4 mg ROW (30 mg/week)  Follow-Up: 4 weeks 2/07/2020  Comments: verbal and written dosing instructions given to pt        Counseling    Counseled about signs/symptoms of bruising/bleeding and appropriate management  Counseled about prevention and management of nosebleeds  Counseled about signs/symptoms of thrombosis and/or stroke and when to seek emergent care  Stressed importance of compliance with exact recommended dosage regimen  Stressed importance of compliance with consistent vitamin K intake  Discussed measures to reduce risk for falls and appropriate action when serious injury occurs  Strongly advised to limit/avoid alcohol consumption  Instructed to notify clinic about medication changes or health status changes  Instructed to notify clinic about scheduled procedures  Discussed risk of thrombosis and/or bleeding with inappropriate anticoagulant use    Patient verbalized understanding and agreement with plan   
Yes

## 2025-01-27 NOTE — ED PROVIDER NOTE - CLINICAL SUMMARY MEDICAL DECISION MAKING FREE TEXT BOX
84-year-old female past medical history of hypertension, CAD status post stent, aortic stenosis status post TAVR, GERD, anxiety, dyslipidemia presents to the emergency department with headache and dizziness that started at 11 AM today.  Stroke code was activated in triage.  Patient denies any falls or injuries.  Patient reports having numbness to bilateral lower extremities and has a chronic left facial droop from prior stroke.  No chest pain or shortness of breath.  Patient denies any fever, chills, recent illness.    Patient was immediately seen and evaluated and neurology evaluated patient.  Lower extremity numbness and tingling resolved upon arrival and headache improved.  Patient has a left facial droop from prior stroke and that is her only NIH stroke score point with NIH score 1.  Patient went for emergent head CT and CTA of head and neck.    ED workup reviewed and patient given Plavix load and was reevaluated.  Overall, symptoms are improving.  CTA shows no stenosis, aneurysm, dissection.  CT perfusion shows no acute perfusion abnormalities.  Neurology recommendations appreciated and patient to go to op status for further neuro checks and possible MRI.

## 2025-01-27 NOTE — ED PROVIDER NOTE - OBJECTIVE STATEMENT
84-year-old female with past medical history of CAD s/p PCI, severe aortic stenosis s/p TAVR, HTN, GERD, anxiety, DLD, cryptogenic CVA presenting with headache and dizziness.  Patient endorses residual left-sided facial droop from previous stroke.  States that she began experiencing similar symptoms to her previous stroke today around 11 AM.  Patient endorsing bilateral lower extremity numbness and tingling.  Denies any trauma.  No chest pain, shortness of breath, palpitations, blurry vision, nausea, vomiting.  Patient follows with Dr. Alford and Dr. Lux, cardiology.  Patient has history of vertigo and has been follow-up with neurology for symptoms.

## 2025-01-27 NOTE — ED CDU PROVIDER DISPOSITION NOTE - CLINICAL COURSE
Patient had presented with strokelike symptoms as documented, initial ED work up negative. Patient placed in obs for MRI as per neuro recs. In obs, MRI obtained and negative and patient subsequently cleared from neuro standpoint. Given the above, will discharge home with outpatient follow up. Patient agreeable with plan. Agrees to return to ED for any new or worsening symptoms.

## 2025-01-27 NOTE — ED CDU PROVIDER INITIAL DAY NOTE - PROGRESS NOTE DETAILS
MR head no acute stroke. Case discussed with Neurology NP Eliane and plan dc home and continue aspirin.

## 2025-01-27 NOTE — ED CDU PROVIDER DISPOSITION NOTE - PATIENT PORTAL LINK FT
You can access the FollowMyHealth Patient Portal offered by [facility] by registering at the following website: [email or web address]. By joining [facility]’s FollowMyHealth portal, you will also be able to view your health information using other applications (apps) compatible with our system.

## 2025-01-27 NOTE — CONSULT NOTE ADULT - ATTENDING COMMENTS
Patient discussed with me over the phone. Agree with above assessment and recommendations. Patient discharged prior to attending evaluation

## 2025-01-27 NOTE — ED ADULT NURSE NOTE - NSFALLHARMRISKINTERV_ED_ALL_ED
Assistance OOB with selected safe patient handling equipment if applicable/Assistance with ambulation/Communicate risk of Fall with Harm to all staff, patient, and family/Monitor gait and stability/Monitor for mental status changes and reorient to person, place, and time, as needed/Provide patient with walking aids/Provide visual cue: red socks, yellow wristband, yellow gown, etc/Reinforce activity limits and safety measures with patient and family/Use of alarms - bed, stretcher, chair and/or video monitoring/Bed in lowest position, wheels locked, appropriate side rails in place/Call bell, personal items and telephone in reach/Instruct patient to call for assistance before getting out of bed/chair/stretcher/Non-slip footwear applied when patient is off stretcher/[place] to call system/Physically safe environment - no spills, clutter or unnecessary equipment/Purposeful Proactive Rounding/Room/bathroom lighting operational, light cord in reach

## 2025-01-27 NOTE — ED PROVIDER NOTE - DIFFERENTIAL DIAGNOSIS
Differential Diagnosis The differential diagnosis for patients clinical presentation includes but is not limited to:  migraine, tension, meningitis (viral, bacterial), SAH, Intracranial bleed, cluster, non-specific headache, sinusitis, viral syndrome, CVA, TIA

## 2025-01-27 NOTE — CONSULT NOTE ADULT - SUBJECTIVE AND OBJECTIVE BOX
Neurology Consult    Patient is a 84y old  Female who presents with a chief complaint of dizziness    HPI:  84-year-old female with past medical history of CAD s/p PCI, severe aortic stenosis s/p TAVR, HTN, GERD, anxiety, DLD, cryptogenic ischemic stroke presenting with headache and dizziness.  Patient endorses residual left-sided facial droop from previous stroke.  States that she began experiencing similar symptoms to her previous stroke today around 11 AM.  Patient endorsing bilateral lower extremity numbness and tingling which resolved on arrival to ED. Stroke code on arrival.     PAST MEDICAL & SURGICAL HISTORY:  Hypertension      CAD (coronary artery disease)      GERD (gastroesophageal reflux disease)      Anxiety      Vertigo      H/O abdominal hysterectomy      H/O total knee replacement, right      S/P cholecystectomy          FAMILY HISTORY:  Family history of stroke (Grandparent, Aunt, Uncle)        Social History: (-) x 3    Allergies    No Known Allergies    Intolerances        MEDICATIONS  (STANDING):    MEDICATIONS  (PRN):      Vital Signs Last 24 Hrs  T(C): 36.8 (27 Jan 2025 12:54), Max: 36.8 (27 Jan 2025 12:54)  T(F): 98.2 (27 Jan 2025 12:54), Max: 98.2 (27 Jan 2025 12:54)  HR: 85 (27 Jan 2025 12:54) (85 - 85)  BP: 120/79 (27 Jan 2025 12:54) (120/79 - 120/79)  BP(mean): --  RR: 18 (27 Jan 2025 12:54) (18 - 18)  SpO2: 99% (27 Jan 2025 12:54) (99% - 99%)    Parameters below as of 27 Jan 2025 12:54  Patient On (Oxygen Delivery Method): room air        Examination:  General:  Appearance is consistent with chronologic age.  No abnormal facies.  Gross skin survey within normal limits.    Cognitive/Language:  The patient is oriented to person, place, month and age.  Recent and remote memory intact.  Fund of knowledge is intact and normal.  Language with normal repetition, comprehension and naming.  Nondysarthric.    Eyes: intact VA, VFF.  EOMI w/o nystagmus, skew or reported double vision.  PERRL.  No ptosis/weakness of eyelid closure.    Face:  Facial sensation normal V1 - 3, chronic left facial asymmetry.    Formal Muscle Strength Testing: (MRC grade R/L) 5/5 UE; 5/5 LE.  No observable drift.  Sensory examination:   Intact to light touch in all extremities.  Cerebellum:   FTN/HKS intact with normal ARTURO in all limbs.  No dysmetria or dysdiadokinesia.  Gait exam limited due to dysequilibrium.    NIHSS 1  m-RS 1    Labs:   CBC Full  -  ( 27 Jan 2025 13:29 )  WBC Count : 4.32 K/uL  RBC Count : 4.11 M/uL  Hemoglobin : 13.5 g/dL  Hematocrit : 40.4 %  Platelet Count - Automated : 192 K/uL  Mean Cell Volume : 98.3 fL  Mean Cell Hemoglobin : 32.8 pg  Mean Cell Hemoglobin Concentration : 33.4 g/dL  Auto Neutrophil # : 2.83 K/uL  Auto Lymphocyte # : 1.11 K/uL  Auto Monocyte # : 0.28 K/uL  Auto Eosinophil # : 0.05 K/uL  Auto Basophil # : 0.03 K/uL  Auto Neutrophil % : 65.4 %  Auto Lymphocyte % : 25.7 %  Auto Monocyte % : 6.5 %  Auto Eosinophil % : 1.2 %  Auto Basophil % : 0.7 %                    Neuroimaging:  < from: CT Brain Stroke Protocol (01.27.25 @ 13:09) >  IMPRESSION:  Questionable dense left MCA within the sylvian fissure potentially   reflecting acute thrombus. A CTA of the brain is recommended for further   evaluation.    Otherwise no CT evidence for acute intracranial pathology.    < end of copied text >         Neurology Consult    Patient is a 84y old  Female who presents with a chief complaint of dizziness    HPI:  84-year-old female with past medical history of CAD s/p PCI, severe aortic stenosis s/p TAVR, HTN, GERD, anxiety, DLD, cryptogenic ischemic stroke presenting with headache and dizziness.  Patient endorses residual left-sided facial droop from previous stroke.  States that she began experiencing similar symptoms to her previous stroke today around 11 AM.  Patient endorsing bilateral lower extremity numbness and tingling which resolved on arrival to ED. Stroke code on arrival.     PAST MEDICAL & SURGICAL HISTORY:  Hypertension      CAD (coronary artery disease)      GERD (gastroesophageal reflux disease)      Anxiety      Vertigo      H/O abdominal hysterectomy      H/O total knee replacement, right      S/P cholecystectomy          FAMILY HISTORY:  Family history of stroke (Grandparent, Aunt, Uncle)        Social History: (-) x 3    Allergies    No Known Allergies    Intolerances        MEDICATIONS  (STANDING):    MEDICATIONS  (PRN):      Vital Signs Last 24 Hrs  T(C): 36.8 (27 Jan 2025 12:54), Max: 36.8 (27 Jan 2025 12:54)  T(F): 98.2 (27 Jan 2025 12:54), Max: 98.2 (27 Jan 2025 12:54)  HR: 85 (27 Jan 2025 12:54) (85 - 85)  BP: 120/79 (27 Jan 2025 12:54) (120/79 - 120/79)  BP(mean): --  RR: 18 (27 Jan 2025 12:54) (18 - 18)  SpO2: 99% (27 Jan 2025 12:54) (99% - 99%)    Parameters below as of 27 Jan 2025 12:54  Patient On (Oxygen Delivery Method): room air        Examination:  General:  Appearance is consistent with chronologic age.  No abnormal facies.  Gross skin survey within normal limits.    Cognitive/Language:  The patient is oriented to person, place, month and age.  Recent and remote memory intact.  Fund of knowledge is intact and normal.  Language with normal repetition, comprehension and naming.  Nondysarthric.    Eyes: intact VA, VFF.  EOMI w/o nystagmus, skew or reported double vision.  PERRL.  No ptosis/weakness of eyelid closure.    Face:  Facial sensation normal V1 - 3, chronic left facial asymmetry.    Formal Muscle Strength Testing: (MRC grade R/L) 5/5 UE; 4+/5 LE.  No observable drift.  Sensory examination:   Intact to light touch in all extremities.  Cerebellum:   FTN/HKS intact with normal ARTURO in all limbs.  No dysmetria or dysdiadokinesia.  Gait exam limited due to dysequilibrium.    NIHSS 1  m-RS 1  Bilateral LE limited ROM chronically and participates with PT/Rehab    Labs:   CBC Full  -  ( 27 Jan 2025 13:29 )  WBC Count : 4.32 K/uL  RBC Count : 4.11 M/uL  Hemoglobin : 13.5 g/dL  Hematocrit : 40.4 %  Platelet Count - Automated : 192 K/uL  Mean Cell Volume : 98.3 fL  Mean Cell Hemoglobin : 32.8 pg  Mean Cell Hemoglobin Concentration : 33.4 g/dL  Auto Neutrophil # : 2.83 K/uL  Auto Lymphocyte # : 1.11 K/uL  Auto Monocyte # : 0.28 K/uL  Auto Eosinophil # : 0.05 K/uL  Auto Basophil # : 0.03 K/uL  Auto Neutrophil % : 65.4 %  Auto Lymphocyte % : 25.7 %  Auto Monocyte % : 6.5 %  Auto Eosinophil % : 1.2 %  Auto Basophil % : 0.7 %                    Neuroimaging:  < from: CT Brain Stroke Protocol (01.27.25 @ 13:09) >  IMPRESSION:  Questionable dense left MCA within the sylvian fissure potentially   reflecting acute thrombus. A CTA of the brain is recommended for further   evaluation.    Otherwise no CT evidence for acute intracranial pathology.    < end of copied text >        < from: CT Angio Neck Stroke Protocol w/ IV Cont (01.27.25 @ 13:29) >  IMPRESSION:    CT PERFUSION:  No evidence of perfusion abnormality.    CTA HEAD:  No evidence of flow-limiting stenosis, occlusion or aneurysm.    CTA NECK:  No evidence of carotid or vertebral artery stenosis.    --- End of Report ---      < end of copied text >   Neurology Consult    Patient is a 84y old  Female who presents with a chief complaint of dizziness    HPI:  84-year-old female with past medical history of CAD s/p PCI, severe aortic stenosis s/p TAVR, HTN, GERD, anxiety, DLD, cryptogenic ischemic stroke presenting with headache and dizziness. Dizziness described as feeling as if she was on a boat.  Patient endorses residual left-sided facial droop from previous stroke.  States that she began experiencing similar symptoms to her previous stroke today around 11 AM.  Patient endorsing bilateral lower extremity numbness and tingling which resolved on arrival to ED. Stroke code on arrival.     PAST MEDICAL & SURGICAL HISTORY:  Hypertension      CAD (coronary artery disease)      GERD (gastroesophageal reflux disease)      Anxiety      Vertigo      H/O abdominal hysterectomy      H/O total knee replacement, right      S/P cholecystectomy          FAMILY HISTORY:  Family history of stroke (Grandparent, Aunt, Uncle)        Social History: (-) x 3    Allergies    No Known Allergies    Intolerances        MEDICATIONS  (STANDING):    MEDICATIONS  (PRN):      Vital Signs Last 24 Hrs  T(C): 36.8 (27 Jan 2025 12:54), Max: 36.8 (27 Jan 2025 12:54)  T(F): 98.2 (27 Jan 2025 12:54), Max: 98.2 (27 Jan 2025 12:54)  HR: 85 (27 Jan 2025 12:54) (85 - 85)  BP: 120/79 (27 Jan 2025 12:54) (120/79 - 120/79)  BP(mean): --  RR: 18 (27 Jan 2025 12:54) (18 - 18)  SpO2: 99% (27 Jan 2025 12:54) (99% - 99%)    Parameters below as of 27 Jan 2025 12:54  Patient On (Oxygen Delivery Method): room air        Examination:  General:  Appearance is consistent with chronologic age.  No abnormal facies.  Gross skin survey within normal limits.    Cognitive/Language:  The patient is oriented to person, place, month and age.  Recent and remote memory intact.  Fund of knowledge is intact and normal.  Language with normal repetition, comprehension and naming.  Nondysarthric.    Eyes: intact VA, VFF.  EOMI w/o nystagmus, skew or reported double vision.  PERRL.  No ptosis/weakness of eyelid closure.    Face:  Facial sensation normal V1 - 3, chronic left facial asymmetry.    Formal Muscle Strength Testing: (MRC grade R/L) 5/5 UE; 4+/5 LE.  No observable drift.  Sensory examination:   Intact to light touch in all extremities.  Cerebellum:   FTN/HKS intact with normal ARTURO in all limbs.  No dysmetria or dysdiadokinesia.  Gait exam limited due to dysequilibrium.    NIHSS 1  m-RS 1  Bilateral LE limited ROM chronically and participates with PT/Rehab    Labs:   CBC Full  -  ( 27 Jan 2025 13:29 )  WBC Count : 4.32 K/uL  RBC Count : 4.11 M/uL  Hemoglobin : 13.5 g/dL  Hematocrit : 40.4 %  Platelet Count - Automated : 192 K/uL  Mean Cell Volume : 98.3 fL  Mean Cell Hemoglobin : 32.8 pg  Mean Cell Hemoglobin Concentration : 33.4 g/dL  Auto Neutrophil # : 2.83 K/uL  Auto Lymphocyte # : 1.11 K/uL  Auto Monocyte # : 0.28 K/uL  Auto Eosinophil # : 0.05 K/uL  Auto Basophil # : 0.03 K/uL  Auto Neutrophil % : 65.4 %  Auto Lymphocyte % : 25.7 %  Auto Monocyte % : 6.5 %  Auto Eosinophil % : 1.2 %  Auto Basophil % : 0.7 %                    Neuroimaging:  < from: CT Brain Stroke Protocol (01.27.25 @ 13:09) >  IMPRESSION:  Questionable dense left MCA within the sylvian fissure potentially   reflecting acute thrombus. A CTA of the brain is recommended for further   evaluation.    Otherwise no CT evidence for acute intracranial pathology.    < end of copied text >        < from: CT Angio Neck Stroke Protocol w/ IV Cont (01.27.25 @ 13:29) >  IMPRESSION:    CT PERFUSION:  No evidence of perfusion abnormality.    CTA HEAD:  No evidence of flow-limiting stenosis, occlusion or aneurysm.    CTA NECK:  No evidence of carotid or vertebral artery stenosis.    --- End of Report ---      < end of copied text >

## 2025-01-27 NOTE — ED CDU PROVIDER INITIAL DAY NOTE - CLINICAL SUMMARY MEDICAL DECISION MAKING FREE TEXT BOX
Patient presented with strokelike symptoms as documented, initial ED work up negative, placed in obs for MRI as per neuro recs. Patient NAD at this time, pending MRI. Will re-eval after results. Patient agreeable with plan.

## 2025-01-27 NOTE — ED CDU PROVIDER INITIAL DAY NOTE - OBJECTIVE STATEMENT
84-year-old female with history of CVA with residual left facial droop, CAD status post stents, aortic stenosis status post TAVR, hypertension, GERD, anxiety, high cholesterol presents to the ED complaining of episode of dizziness feeling off balance and headache that started around 11.  Patient states she had bilateral lower extremity numbness and tingling.  Patient states symptoms are similar to when she had a previous stroke.  Patient denies any chest pain, shortness of breath, leg pain, leg swelling or weakness.

## 2025-01-27 NOTE — CONSULT NOTE ADULT - ASSESSMENT
Impression:  84-year-old female with past medical history of CAD s/p PCI, severe aortic stenosis s/p TAVR, HTN, GERD, anxiety, DLD, cryptogenic ischemic stroke presenting with headache and dizziness. Patient has been worked up for vertigo by ENT.  Patient endorses residual left-sided facial droop from previous stroke.  States that she began experiencing similar symptoms to her previous stroke today around 11 AM.  Patient endorsing bilateral lower extremity numbness and tingling which resolved on arrival to ED. Stroke code on arrival. NIHSS 1 for chronic left facial asymmetry not a candidate for IV or IA acute stroke intervention. Headache resolved. Etiology of symptoms may be due to peripheral cause such as BPPV or vestibular neuronitis however central cause such as ischemic stroke should be ruled out.     Suggestion:  Follow up CTA.   MRI brain without bony if TAVR MRI safe  Telemetry monitoring   Q4 neuro checks  Continue ASA.   Can load with Plavix 300 mg PO x1 and tomorrow can start Plavix 75 mg PO daily.   ED observational unit.  Impression:  84-year-old female with past medical history of CAD s/p PCI, severe aortic stenosis s/p TAVR, HTN, GERD, anxiety, DLD, cryptogenic ischemic stroke presenting with headache and dizziness. Patient has been worked up for vertigo by ENT.  Patient endorses residual left-sided facial droop from previous stroke.  States that she began experiencing similar symptoms to her previous stroke today around 11 AM.  Patient endorsing bilateral lower extremity numbness and tingling which resolved on arrival to ED. Stroke code on arrival. NIHSS 1 for chronic left facial asymmetry not a candidate for IV or IA acute stroke intervention. Headache resolved. Etiology of symptoms may be due to peripheral cause such as BPPV or vestibular neuronitis however central cause such as ischemic stroke should be ruled out.     Suggestion:  MRI brain without bony if TAVR/ILR are MRI safe  Telemetry monitoring   Q4 neuro checks  Continue ASA/statin.  Can load with Plavix 300 mg PO x1 and tomorrow can start Plavix 75 mg PO daily.   ED observational unit.  Impression:  84-year-old female with past medical history of CAD s/p PCI, severe aortic stenosis s/p TAVR, HTN, GERD, anxiety, DLD, cryptogenic ischemic stroke presenting with headache and dizziness. Dizziness described as feeling as if she was on a boat. Patient has been worked up for vertigo by ENT.  Patient endorses residual left-sided facial droop from previous stroke.  States that she began experiencing similar symptoms to her previous stroke today around 11 AM.  Patient endorsing bilateral lower extremity numbness and tingling which resolved on arrival to ED. Stroke code on arrival. NIHSS 1 for chronic left facial asymmetry not a candidate for IV or IA acute stroke intervention. Headache resolved. Etiology of symptoms may be due to peripheral cause such as BPPV or vestibular neuronitis however central cause such as ischemic stroke should be ruled out.     Suggestion:  MRI brain without bony if TAVR/ILR are MRI safe  Telemetry monitoring   Q4 neuro checks  Continue ASA/statin.  Can load with Plavix 300 mg PO x1 and tomorrow can start Plavix 75 mg PO daily.   ED observational unit.

## 2025-02-11 ENCOUNTER — APPOINTMENT (OUTPATIENT)
Dept: OTOLARYNGOLOGY | Facility: CLINIC | Age: 85
End: 2025-02-11
Payer: MEDICARE

## 2025-02-11 DIAGNOSIS — H61.23 IMPACTED CERUMEN, BILATERAL: ICD-10-CM

## 2025-02-11 DIAGNOSIS — H93.8X3 OTHER SPECIFIED DISORDERS OF EAR, BILATERAL: ICD-10-CM

## 2025-02-11 DIAGNOSIS — R26.89 OTHER ABNORMALITIES OF GAIT AND MOBILITY: ICD-10-CM

## 2025-02-11 DIAGNOSIS — H90.3 SENSORINEURAL HEARING LOSS, BILATERAL: ICD-10-CM

## 2025-02-11 DIAGNOSIS — R42 DIZZINESS AND GIDDINESS: ICD-10-CM

## 2025-02-11 PROCEDURE — G0268 REMOVAL OF IMPACTED WAX MD: CPT

## 2025-02-11 PROCEDURE — 99214 OFFICE O/P EST MOD 30 MIN: CPT | Mod: 25

## 2025-02-19 ENCOUNTER — APPOINTMENT (OUTPATIENT)
Dept: OTOLARYNGOLOGY | Facility: CLINIC | Age: 85
End: 2025-02-19

## 2025-02-24 ENCOUNTER — NON-APPOINTMENT (OUTPATIENT)
Age: 85
End: 2025-02-24

## 2025-02-24 ENCOUNTER — APPOINTMENT (OUTPATIENT)
Dept: CARDIOLOGY | Facility: CLINIC | Age: 85
End: 2025-02-24

## 2025-02-24 ENCOUNTER — APPOINTMENT (OUTPATIENT)
Dept: CARDIOLOGY | Facility: CLINIC | Age: 85
End: 2025-02-24
Payer: MEDICARE

## 2025-02-24 VITALS
HEIGHT: 64 IN | WEIGHT: 148 LBS | HEART RATE: 74 BPM | BODY MASS INDEX: 25.27 KG/M2 | DIASTOLIC BLOOD PRESSURE: 80 MMHG | SYSTOLIC BLOOD PRESSURE: 132 MMHG

## 2025-02-24 DIAGNOSIS — F41.9 ANXIETY DISORDER, UNSPECIFIED: ICD-10-CM

## 2025-02-24 DIAGNOSIS — I49.3 VENTRICULAR PREMATURE DEPOLARIZATION: ICD-10-CM

## 2025-02-24 DIAGNOSIS — I35.0 NONRHEUMATIC AORTIC (VALVE) STENOSIS: ICD-10-CM

## 2025-02-24 DIAGNOSIS — I10 ESSENTIAL (PRIMARY) HYPERTENSION: ICD-10-CM

## 2025-02-24 DIAGNOSIS — I25.10 ATHEROSCLEROTIC HEART DISEASE OF NATIVE CORONARY ARTERY W/OUT ANGINA PECTORIS: ICD-10-CM

## 2025-02-24 PROCEDURE — 93000 ELECTROCARDIOGRAM COMPLETE: CPT

## 2025-02-24 PROCEDURE — 93298 REM INTERROG DEV EVAL SCRMS: CPT

## 2025-02-24 PROCEDURE — 99214 OFFICE O/P EST MOD 30 MIN: CPT | Mod: 25

## 2025-03-20 ENCOUNTER — APPOINTMENT (OUTPATIENT)
Dept: OTOLARYNGOLOGY | Facility: CLINIC | Age: 85
End: 2025-03-20

## 2025-03-27 ENCOUNTER — APPOINTMENT (OUTPATIENT)
Dept: OTOLARYNGOLOGY | Facility: CLINIC | Age: 85
End: 2025-03-27

## 2025-03-31 ENCOUNTER — NON-APPOINTMENT (OUTPATIENT)
Age: 85
End: 2025-03-31

## 2025-03-31 ENCOUNTER — APPOINTMENT (OUTPATIENT)
Dept: CARDIOLOGY | Facility: CLINIC | Age: 85
End: 2025-03-31
Payer: MEDICARE

## 2025-03-31 PROCEDURE — 93298 REM INTERROG DEV EVAL SCRMS: CPT

## 2025-04-18 ENCOUNTER — OUTPATIENT (OUTPATIENT)
Dept: OUTPATIENT SERVICES | Facility: HOSPITAL | Age: 85
LOS: 1 days | End: 2025-04-18
Payer: MEDICARE

## 2025-04-18 DIAGNOSIS — Z90.49 ACQUIRED ABSENCE OF OTHER SPECIFIED PARTS OF DIGESTIVE TRACT: Chronic | ICD-10-CM

## 2025-04-18 DIAGNOSIS — Z96.651 PRESENCE OF RIGHT ARTIFICIAL KNEE JOINT: Chronic | ICD-10-CM

## 2025-04-18 DIAGNOSIS — M54.16 RADICULOPATHY, LUMBAR REGION: ICD-10-CM

## 2025-04-18 DIAGNOSIS — Z90.710 ACQUIRED ABSENCE OF BOTH CERVIX AND UTERUS: Chronic | ICD-10-CM

## 2025-04-18 PROCEDURE — 73502 X-RAY EXAM HIP UNI 2-3 VIEWS: CPT | Mod: RT

## 2025-04-18 PROCEDURE — 73110 X-RAY EXAM OF WRIST: CPT | Mod: 26,LT

## 2025-04-18 PROCEDURE — 73130 X-RAY EXAM OF HAND: CPT | Mod: LT

## 2025-04-18 PROCEDURE — 73130 X-RAY EXAM OF HAND: CPT | Mod: 26,LT

## 2025-04-18 PROCEDURE — 73110 X-RAY EXAM OF WRIST: CPT | Mod: LT

## 2025-04-18 PROCEDURE — 72110 X-RAY EXAM L-2 SPINE 4/>VWS: CPT

## 2025-04-18 PROCEDURE — 72110 X-RAY EXAM L-2 SPINE 4/>VWS: CPT | Mod: 26

## 2025-04-18 PROCEDURE — 73502 X-RAY EXAM HIP UNI 2-3 VIEWS: CPT | Mod: 26,RT

## 2025-04-19 DIAGNOSIS — M54.16 RADICULOPATHY, LUMBAR REGION: ICD-10-CM

## 2025-05-02 ENCOUNTER — NON-APPOINTMENT (OUTPATIENT)
Age: 85
End: 2025-05-02

## 2025-05-02 ENCOUNTER — APPOINTMENT (OUTPATIENT)
Dept: CARDIOLOGY | Facility: CLINIC | Age: 85
End: 2025-05-02
Payer: MEDICARE

## 2025-05-02 PROCEDURE — 93298 REM INTERROG DEV EVAL SCRMS: CPT

## 2025-06-06 ENCOUNTER — NON-APPOINTMENT (OUTPATIENT)
Age: 85
End: 2025-06-06

## 2025-06-06 ENCOUNTER — APPOINTMENT (OUTPATIENT)
Dept: CARDIOLOGY | Facility: CLINIC | Age: 85
End: 2025-06-06
Payer: MEDICARE

## 2025-06-06 PROCEDURE — 93298 REM INTERROG DEV EVAL SCRMS: CPT

## 2025-06-11 ENCOUNTER — APPOINTMENT (OUTPATIENT)
Dept: OTOLARYNGOLOGY | Facility: CLINIC | Age: 85
End: 2025-06-11

## 2025-06-19 ENCOUNTER — APPOINTMENT (OUTPATIENT)
Dept: ELECTROPHYSIOLOGY | Facility: CLINIC | Age: 85
End: 2025-06-19

## 2025-06-19 PROCEDURE — ZZZZZ: CPT

## 2025-07-02 ENCOUNTER — APPOINTMENT (OUTPATIENT)
Dept: OTOLARYNGOLOGY | Facility: CLINIC | Age: 85
End: 2025-07-02
Payer: MEDICARE

## 2025-07-02 PROCEDURE — V5299A: CUSTOM

## 2025-07-03 ENCOUNTER — OUTPATIENT (OUTPATIENT)
Dept: OUTPATIENT SERVICES | Facility: HOSPITAL | Age: 85
LOS: 1 days | End: 2025-07-03
Payer: MEDICARE

## 2025-07-03 DIAGNOSIS — Z00.8 ENCOUNTER FOR OTHER GENERAL EXAMINATION: ICD-10-CM

## 2025-07-03 DIAGNOSIS — Z90.710 ACQUIRED ABSENCE OF BOTH CERVIX AND UTERUS: Chronic | ICD-10-CM

## 2025-07-03 DIAGNOSIS — M54.16 RADICULOPATHY, LUMBAR REGION: ICD-10-CM

## 2025-07-03 DIAGNOSIS — Z96.651 PRESENCE OF RIGHT ARTIFICIAL KNEE JOINT: Chronic | ICD-10-CM

## 2025-07-03 DIAGNOSIS — Z90.49 ACQUIRED ABSENCE OF OTHER SPECIFIED PARTS OF DIGESTIVE TRACT: Chronic | ICD-10-CM

## 2025-07-03 PROCEDURE — 72148 MRI LUMBAR SPINE W/O DYE: CPT

## 2025-07-03 PROCEDURE — 72148 MRI LUMBAR SPINE W/O DYE: CPT | Mod: 26

## 2025-07-04 DIAGNOSIS — M54.16 RADICULOPATHY, LUMBAR REGION: ICD-10-CM

## 2025-07-07 ENCOUNTER — TRANSCRIPTION ENCOUNTER (OUTPATIENT)
Age: 85
End: 2025-07-07

## 2025-07-07 ENCOUNTER — OUTPATIENT (OUTPATIENT)
Dept: OUTPATIENT SERVICES | Facility: HOSPITAL | Age: 85
LOS: 1 days | Discharge: HOME | End: 2025-07-07
Payer: MEDICARE

## 2025-07-07 ENCOUNTER — APPOINTMENT (OUTPATIENT)
Dept: ELECTROPHYSIOLOGY | Facility: HOSPITAL | Age: 85
End: 2025-07-07

## 2025-07-07 DIAGNOSIS — Z96.651 PRESENCE OF RIGHT ARTIFICIAL KNEE JOINT: Chronic | ICD-10-CM

## 2025-07-07 DIAGNOSIS — Z90.49 ACQUIRED ABSENCE OF OTHER SPECIFIED PARTS OF DIGESTIVE TRACT: Chronic | ICD-10-CM

## 2025-07-07 DIAGNOSIS — R00.1 BRADYCARDIA, UNSPECIFIED: ICD-10-CM

## 2025-07-07 DIAGNOSIS — Z90.710 ACQUIRED ABSENCE OF BOTH CERVIX AND UTERUS: Chronic | ICD-10-CM

## 2025-07-07 PROCEDURE — 33286 RMVL SUBQ CAR RHYTHM MNTR: CPT

## 2025-07-07 RX ORDER — CEPHALEXIN 250 MG/1
500 CAPSULE ORAL ONCE
Refills: 0 | Status: DISCONTINUED | OUTPATIENT
Start: 2025-07-07 | End: 2025-07-07

## 2025-07-07 NOTE — ASU DISCHARGE PLAN (ADULT/PEDIATRIC) - NS MD DC FALL RISK RISK
For information on Fall & Injury Prevention, visit: [email or web address] OR  [email or web address] OR  https://www.cdc.gov/steadi/patient.html

## 2025-07-07 NOTE — PROGRESS NOTE ADULT - SUBJECTIVE AND OBJECTIVE BOX
Electrophysiology Brief Post-Op Note    I have personally seen and examined the patient.  I agree with the history and physical which I have reviewed and noted any changes below.  07-07-25 @ 15:30    PRE-OP DIAGNOSIS: Cryptogenic CVA    POST-OP DIAGNOSIS: Cryptogenic CVA    PROCEDURE: Loop Explant      Physician Assistant:  CHRISTIANO LEUNG  Physician: Dr Pierce    ESTIMATED BLOOD LOSS:  2    mL    ANESTHESIA TYPE:  [  ]General Anesthesia  [  ] Sedation  [X  ] Local/Regional    CONDITION  [  ] Critical  [  ] Serious  [  ]Fair  [ X ]Good    SPECIMENS REMOVED (IF APPLICABLE):  [id number]    EXPLANTS (IF APPLICABLE)  Loop Recorder ([employer])    FINDINGS  PLAN OF CARE  - May remove bandaid in 3 days  - May shower in 3 days  Follow up in  ____PMD in 1 month

## 2025-07-07 NOTE — H&P ADULT - ASSESSMENT
84yFemale with h/o CVA, ILR   presents for elective explantation of loop recorder    Plan:  explant the device  Kelfex 500mg x1 periop  observe post procedure  discharge home when recovers  Remove dressing in 2 days  Do not wet site for 48hrs  f/u in the office in 1month

## 2025-07-07 NOTE — H&P ADULT - HISTORY OF PRESENT ILLNESS
84-year-old female with PMHx of CAD s/p PCI, severe aortic stenosis s/p TAVR, HTN, GERD, anxiety, DLD, recent cryptogenic CVA, ILR placed at that time 6/2024, no events within a year presents for elective ILR removal      Device Check    Pacemaker/ICD : CHRISTOPHER.    Model: LINQ II. Serial Number: [id number]. Date of Implant: 6/11/2024.    Comments:. No events.

## 2025-07-07 NOTE — ASU DISCHARGE PLAN (ADULT/PEDIATRIC) - FINANCIAL ASSISTANCE
[facility] provides services at a reduced cost to those who are determined to be eligible through [facility]’s financial assistance program. Information regarding [facility]’s financial assistance program can be found by going to [email or web address] or by calling [phone].

## 2025-07-07 NOTE — H&P ADULT - NSHPPHYSICALEXAM_GEN_ALL_CORE
Constitutional:  well developed, well nourished, no acute distress.    Cardiac:  normal S1, S2, no murmur, no rub, no gallop.    Constitutional: well developed, normal appearance, well groomed, well nourished, no deformities and no acute distress.    Cardiovascular: heart rate and rhythm were normal and normal S1 and S2.    Pulmonary: no respiratory distress.    Skin: The surgical incision was located on the Left parasternal. The incision was clean, dry and well-healed.    Abdomen: soft.    Extremities: no clubbing of the fingernails and no localized cyanosis.

## 2025-07-09 DIAGNOSIS — I63.9 CEREBRAL INFARCTION, UNSPECIFIED: ICD-10-CM

## 2025-07-22 ENCOUNTER — APPOINTMENT (OUTPATIENT)
Dept: CARDIOLOGY | Facility: CLINIC | Age: 85
End: 2025-07-22

## 2025-08-29 ENCOUNTER — APPOINTMENT (OUTPATIENT)
Dept: CARDIOLOGY | Facility: CLINIC | Age: 85
End: 2025-08-29
Payer: MEDICARE

## 2025-08-29 VITALS
SYSTOLIC BLOOD PRESSURE: 128 MMHG | DIASTOLIC BLOOD PRESSURE: 84 MMHG | HEART RATE: 76 BPM | BODY MASS INDEX: 24.89 KG/M2 | WEIGHT: 145 LBS

## 2025-08-29 VITALS — OXYGEN SATURATION: 99 %

## 2025-08-29 DIAGNOSIS — I10 ESSENTIAL (PRIMARY) HYPERTENSION: ICD-10-CM

## 2025-08-29 DIAGNOSIS — I25.10 ATHEROSCLEROTIC HEART DISEASE OF NATIVE CORONARY ARTERY W/OUT ANGINA PECTORIS: ICD-10-CM

## 2025-08-29 DIAGNOSIS — I35.0 NONRHEUMATIC AORTIC (VALVE) STENOSIS: ICD-10-CM

## 2025-08-29 PROCEDURE — 99214 OFFICE O/P EST MOD 30 MIN: CPT | Mod: 25

## 2025-08-29 PROCEDURE — 93000 ELECTROCARDIOGRAM COMPLETE: CPT

## 2025-08-29 RX ORDER — ATORVASTATIN CALCIUM 40 MG/1
40 TABLET, FILM COATED ORAL DAILY
Qty: 90 | Refills: 3 | Status: ACTIVE | COMMUNITY
Start: 1900-01-01 | End: 1900-01-01

## 2025-09-05 ENCOUNTER — LABORATORY RESULT (OUTPATIENT)
Age: 85
End: 2025-09-05

## 2025-09-05 LAB
ALBUMIN SERPL ELPH-MCNC: 5.1 G/DL
ALP BLD-CCNC: 95 U/L
ALT SERPL-CCNC: 9 U/L
ANION GAP SERPL CALC-SCNC: 15 MMOL/L
AST SERPL-CCNC: 13 U/L
BASOPHILS # BLD AUTO: 0.03 K/UL
BASOPHILS NFR BLD AUTO: 0.6 %
BILIRUB SERPL-MCNC: 0.4 MG/DL
BUN SERPL-MCNC: 16 MG/DL
CALCIUM SERPL-MCNC: 10 MG/DL
CHLORIDE SERPL-SCNC: 103 MMOL/L
CHOLEST SERPL-MCNC: 144 MG/DL
CO2 SERPL-SCNC: 25 MMOL/L
CREAT SERPL-MCNC: 0.8 MG/DL
EGFRCR SERPLBLD CKD-EPI 2021: 73 ML/MIN/1.73M2
EOSINOPHIL # BLD AUTO: 0.07 K/UL
EOSINOPHIL NFR BLD AUTO: 1.5 %
ESTIMATED AVERAGE GLUCOSE: 120 MG/DL
GLUCOSE SERPL-MCNC: 101 MG/DL
HBA1C MFR BLD HPLC: 5.8 %
HCT VFR BLD CALC: 41.6 %
HDLC SERPL-MCNC: 71 MG/DL
HGB BLD-MCNC: 13.6 G/DL
IMM GRANULOCYTES NFR BLD AUTO: 0 %
LDLC SERPL-MCNC: 58 MG/DL
LYMPHOCYTES # BLD AUTO: 1.81 K/UL
LYMPHOCYTES NFR BLD AUTO: 37.8 %
MAN DIFF?: NORMAL
MCHC RBC-ENTMCNC: 32.7 G/DL
MCHC RBC-ENTMCNC: 32.9 PG
MCV RBC AUTO: 100.5 FL
MONOCYTES # BLD AUTO: 0.5 K/UL
MONOCYTES NFR BLD AUTO: 10.4 %
NEUTROPHILS # BLD AUTO: 2.38 K/UL
NEUTROPHILS NFR BLD AUTO: 49.7 %
NONHDLC SERPL-MCNC: 73 MG/DL
PLATELET # BLD AUTO: 199 K/UL
POTASSIUM SERPL-SCNC: 3.9 MMOL/L
PROT SERPL-MCNC: 7 G/DL
RBC # BLD: 4.14 M/UL
RBC # FLD: 14.3 %
SODIUM SERPL-SCNC: 143 MMOL/L
TRIGL SERPL-MCNC: 78 MG/DL
TSH SERPL-ACNC: 1.32 UIU/ML
WBC # FLD AUTO: 4.79 K/UL